# Patient Record
Sex: FEMALE | Race: BLACK OR AFRICAN AMERICAN | NOT HISPANIC OR LATINO | Employment: FULL TIME | URBAN - METROPOLITAN AREA
[De-identification: names, ages, dates, MRNs, and addresses within clinical notes are randomized per-mention and may not be internally consistent; named-entity substitution may affect disease eponyms.]

---

## 2017-01-04 ENCOUNTER — PRE VISIT (OUTPATIENT)
Dept: UROLOGY | Facility: CLINIC | Age: 37
End: 2017-01-04

## 2017-01-12 ENCOUNTER — OFFICE VISIT (OUTPATIENT)
Dept: UROLOGY | Facility: CLINIC | Age: 37
End: 2017-01-12
Attending: INTERNAL MEDICINE

## 2017-01-12 VITALS
HEART RATE: 85 BPM | HEIGHT: 63 IN | WEIGHT: 148 LBS | SYSTOLIC BLOOD PRESSURE: 110 MMHG | DIASTOLIC BLOOD PRESSURE: 61 MMHG | BODY MASS INDEX: 26.22 KG/M2

## 2017-01-12 DIAGNOSIS — C64.2 RENAL CELL CANCER, LEFT (H): Primary | ICD-10-CM

## 2017-01-12 ASSESSMENT — PAIN SCALES - GENERAL: PAINLEVEL: NO PAIN (0)

## 2017-01-12 NOTE — Clinical Note
1/12/2017       RE: Brynn Sprague  [address] [facility] [address]     Dear Colleague,    Thank you for referring your patient, Brynn Sprague, to the [facility] UROLOGY AND [facility] FOR PROSTATE AND UROLOGIC CANCERS at [facility]. Please see a copy of my visit note below.          Chief Complaint:   Renal Mass           Consult or Referral:     Mr. Brynn Sprague is a 36 year old female seen in consultation from Dr. Bagley.         History of Present Illness:   Brynn Sprague is a very pleasant 36 year old female who presents with a history of a Renal Mass.  This was discovered on CT after the pt presented to the emergency department with abdominal pain and shortness of breath in November 2016. The mass is large and enhancing measuring 5.7 x 5.6 cm in the upper pole of the left kidney. With regard to the collecting system, the edge of the tumor impinges upon the upper pole calyces. 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 The pt saw Dr Bagley of oncology who referred the pt to urology for surgical assessment.       Her last SCr is 0.58 12/16/2016.  She has no known risk factors for RCC including known exposures to dyes, pesticides, or chemicals. She has no known family history of kidney or other cancers.    Today, she is doing well, although anxious about her new diagnosis. She experiences intermittent left sided back pain associated with sitting against a hard surface or deep inspiration. She continues to experience mild dyspnea associated with eating large meals. Her symptoms have not prevented her from continuing with her daily work and activities. She has not had any fevers, chills, or weight loss.    ECOG Performance Score: 0  0=Fully active, 1=Unable to do strenuous activity but capable of light work, 2=Ambulatory and capable of all selfcare, 3=Capable of limited selfcare, confined to bed >50%  of waking hours, 4=Completely disabled.           Past Medical History:     Past Medical History   Diagnosis Date     Normal pregnancy 2006,08,09,12            Past Surgical History:     Past Surgical History   Procedure Laterality Date     Insert intrauterine device  10/16/15            Medications     Current Outpatient Prescriptions   Medication     ANUCORT-HC 25 MG suppository     levonorgestrel (MIRENA) 20 MCG/24HR IUD     No current facility-administered medications for this visit.            Family History:     Family History   Problem Relation Age of Onset     CANCER No family hx of             Social History:     Social History     Social History     Marital Status:      Spouse Name: Carmen Sahni     Number of Children: 4     Years of Education: N/A     Occupational History     facilities management [employer]     Social History Main Topics     Smoking status: Never Smoker      Smokeless tobacco: Never Used     Alcohol Use: No     Drug Use: No     Sexual Activity:     Partners: Male     Birth Control/ Protection: IUD     Other Topics Concern     Parent/Sibling W/ Cabg, Mi Or Angioplasty Before 65f 55m? No     Social History Narrative            Allergies:   Nkda         Review of Systems:  From intake questionnaire     Negative 14 system review except as noted on HPI, nurse's note.          Labs and Pathology:    I reviewed all applicable laboratory and pathology data reviewed with patient  Significant for     CR     0.58   12/16/2016  CR     0.57   3/19/2012        Imaging:    I reviewed all applicable imaging and went over the results with the patient.    11/22/2016 CT abdomen and pelvis:  There is a large enhancing mass in the upper pole left kidney  measuring 5.7 x 5.6 cm in maximum axial dimensions and 8.2 cm in  craniocaudal dimension. This impinges upon the upper pole calyces, but  there is no hydronephrosis. Kidneys are otherwise unremarkable  bilaterally. No renal calculi.  Upper abdominal organs including the  liver, spleen, contracted gallbladder, pancreas and adrenal glands are  within normal limits. No enlarged lymph nodes      Outside and Past Medical records:    I spent 10 minutes reviewing outside and past medical records.           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  Evaluation of the literature demonstrates that approximately 80% of enhancing renal masses turn out to be kidney cancer upon removal, while 20% are found to be benign.  Her kidney was biopsied and shown to be renal cell cancer    We discussed the options for treatment of a localized kidney mass including active surveillance, thermal ablation, and surgical extirpation.    Furthermore, we discussed the relative merits of partial nephrectomy vs. radical nephrectomy and the theoretic basis behind maximal nephron preservation.  We also discussed the advantages and disadvantages and roles of open surgery vs. laparoscopic (and Da Luda assisted) surgery.    The anticipated post-operative course was explained, including an anticipated day(s) hospital stay.    Patient has decided she would like to proceed with Robotic Partial Nephrectomy - 31799 with the possibility of needing to do a rad nephrectomy      The risks, benefits, alternatives, and personnel involved in the procudure were discussed.  All questions were answered.  A written informed consent will be finalized on the morning of the procedure.          Orders  Orders Placed This Encounter   Procedures     Consuelo-Operative Worksheet       F/U:   For surgery      As the medical student, I acted as a scribe for this note. All portions of the documented history and physical were personally performed by Dr. Cross as the attending physician.     Manasa Weinstein, MS3    Attestation:  This patient was seen  "and evaluated by me, with the medical student serving as scribe.  I have reviewed the note above and agree.  The physical exam was performed by me and the pertinant details are outlined below.          Physical Exam:     Patient is a 36 year old  female   Vitals: Blood pressure 110/61, pulse 85, height 1.594 m (5' 2.76\"), weight 67.132 kg (148 lb), not currently breastfeeding.  General Appearance Adult: Alert, anxious no acute distress, oriented  Lungs: no respiratory distress, or pursed lip breathing  Abdomen: Body mass index is 26.42 kg/(m^2)., scars noted None  Lymphatics: No cervical or supraclavicular adenopathy  Musculoskeltal: extremities normal, no peripheral edema  Skin: no visible suspicious lesions or rashes  Neuro: Alert, oriented, speech and mentation normal  Psych: affect and mood normal  Gait: Normal  : deferred    Assessment:  Renal cell cancer    Plan:  Robotic partial vs. Radical nephrectomy    Jairo Cross MD  Department of Urology  [facility]      CC  Patient Care Team:  Dallas Parra PA-C as PCP - General (Physician Assistant)  Ileana Esteves NP as Nurse Practitioner  Kirstie Matos APRN CNS as Nurse Practitioner (Nurse)  Clementina Philip RN as Nurse Coordinator  VINCENT NATARAJAN    Copy to patient  REBECA GÓMEZ  [address] [facility] [address]        "

## 2017-01-12 NOTE — PROGRESS NOTES
Chief Complaint:   Renal Mass           Consult or Referral:     Mr. Brynn Sprague is a 36 year old female seen in consultation from Dr. Bagley.         History of Present Illness:   Brynn Sprague is a very pleasant 36 year old female who presents with a history of a Renal Mass.  This was discovered on CT after the pt presented to the emergency department with abdominal pain and shortness of breath in November 2016. The mass is large and enhancing measuring 5.7 x 5.6 cm in the upper pole of the left kidney. With regard to the collecting system, the edge of the tumor impinges upon the upper pole calyces. 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 The pt saw Dr Bagley of oncology who referred the pt to urology for surgical assessment.       Her last SCr is 0.58 12/16/2016.  She has no known risk factors for RCC including known exposures to dyes, pesticides, or chemicals. She has no known family history of kidney or other cancers.    Today, she is doing well, although anxious about her new diagnosis. She experiences intermittent left sided back pain associated with sitting against a hard surface or deep inspiration. She continues to experience mild dyspnea associated with eating large meals. Her symptoms have not prevented her from continuing with her daily work and activities. She has not had any fevers, chills, or weight loss.    ECOG Performance Score: 0  0=Fully active, 1=Unable to do strenuous activity but capable of light work, 2=Ambulatory and capable of all selfcare, 3=Capable of limited selfcare, confined to bed >50% of waking hours, 4=Completely disabled.           Past Medical History:     Past Medical History   Diagnosis Date     Normal pregnancy 2006,08,09,12            Past Surgical History:     Past Surgical History   Procedure Laterality Date     Insert intrauterine device  10/16/15            Medications     Current Outpatient  Prescriptions   Medication     ANUCORT-HC 25 MG suppository     levonorgestrel (MIRENA) 20 MCG/24HR IUD     No current facility-administered medications for this visit.            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 No     Social History Narrative            Allergies:   Nkda         Review of Systems:  From intake questionnaire     Negative 14 system review except as noted on HPI, nurse's note.          Labs and Pathology:    I reviewed all applicable laboratory and pathology data reviewed with patient  Significant for     CR     0.58   12/16/2016  CR     0.57   3/19/2012        Imaging:    I reviewed all applicable imaging and went over the results with the patient.    11/22/2016 CT abdomen and pelvis:  There is a large enhancing mass in the upper pole left kidney  measuring 5.7 x 5.6 cm in maximum axial dimensions and 8.2 cm in  craniocaudal dimension. This impinges upon the upper pole calyces, but  there is no hydronephrosis. Kidneys are otherwise unremarkable  bilaterally. No renal calculi. Upper abdominal organs including the  liver, spleen, contracted gallbladder, pancreas and adrenal glands are  within normal limits. No enlarged lymph nodes      Outside and Past Medical records:    I spent 10 minutes reviewing outside and past medical records.           Assessment and Plan:     Assessment:  Stage II cT2 cN0  "cM0 left chromophobe renal cell carcinoma    Plan:  We had an extensive discussion about the significance of a localized, enhancing kidney mass.  Evaluation of the literature demonstrates that approximately 80% of enhancing renal masses turn out to be kidney cancer upon removal, while 20% are found to be benign.  Her kidney was biopsied and shown to be renal cell cancer    We discussed the options for treatment of a localized kidney mass including active surveillance, thermal ablation, and surgical extirpation.    Furthermore, we discussed the relative merits of partial nephrectomy vs. radical nephrectomy and the theoretic basis behind maximal nephron preservation.  We also discussed the advantages and disadvantages and roles of open surgery vs. laparoscopic (and Da Luda assisted) surgery.    The anticipated post-operative course was explained, including an anticipated day(s) hospital stay.    Patient has decided she would like to proceed with Robotic Partial Nephrectomy - 61762 with the possibility of needing to do a rad nephrectomy      The risks, benefits, alternatives, and personnel involved in the procudure were discussed.  All questions were answered.  A written informed consent will be finalized on the morning of the procedure.          Orders  Orders Placed This Encounter   Procedures     Consuelo-Operative Worksheet       F/U:   For surgery      As the medical student, I acted as a scribe for this note. All portions of the documented history and physical were personally performed by Dr. Cross as the attending physician.     Manasa Weinstein, MS3    Attestation:  This patient was seen and evaluated by me, with the medical student serving as scribe.  I have reviewed the note above and agree.  The physical exam was performed by me and the pertinant details are outlined below.          Physical Exam:     Patient is a 36 year old  female   Vitals: Blood pressure 110/61, pulse 85, height 1.594 m (5' 2.76\"), " weight 67.132 kg (148 lb), not currently breastfeeding.  General Appearance Adult: Alert, anxious no acute distress, oriented  Lungs: no respiratory distress, or pursed lip breathing  Abdomen: Body mass index is 26.42 kg/(m^2)., scars noted None  Lymphatics: No cervical or supraclavicular adenopathy  Musculoskeltal: extremities normal, no peripheral edema  Skin: no visible suspicious lesions or rashes  Neuro: Alert, oriented, speech and mentation normal  Psych: affect and mood normal  Gait: Normal  : deferred    Assessment:  Renal cell cancer    Plan:  Robotic partial vs. Radical nephrectomy    Jairo Cross MD  Department of Urology  [facility]        CC  Patient Care Team:  Dallas Parra PA-C as PCP - General (Physician Assistant)  Ileana Esteves NP as Nurse Practitioner  Kirstie Matos APRN CNS as Nurse Practitioner (Nurse)  Clementina Philip RN as Nurse Coordinator  VINCENT NATARAJAN    Copy to patient  REBECA ROMERORITA  [address] [facility] [address]

## 2017-01-12 NOTE — PATIENT INSTRUCTIONS
Schedule surgery with Dr. Cross.    It was a pleasure meeting with you today.  Thank you for allowing me and my team the privilege of caring for you today.  YOU are the reason we are here, and I truly hope we provided you with the excellent service you deserve.  Please let us know if there is anything else we can do for you so that we can be sure you are leaving completely satisfied with your care experience.      GABBY Levy

## 2017-01-12 NOTE — NURSING NOTE
"Chief Complaint   Patient presents with     Consult     Renal cell cancer consult       Blood pressure 110/61, pulse 85, height 1.594 m (5' 2.76\"), weight 67.132 kg (148 lb), not currently breastfeeding. Body mass index is 26.42 kg/(m^2).    Patient Active Problem List   Diagnosis     CARDIOVASCULAR SCREENING; LDL GOAL LESS THAN 160     Cervical polyp     Impingement syndrome of right shoulder       Allergies   Allergen Reactions     Nkda [No Known Drug Allergies]        Current Outpatient Prescriptions   Medication Sig Dispense Refill     ANUCORT-HC 25 MG suppository Place 1 suppository (25 mg) rectally as needed 24 suppository 0     levonorgestrel (MIRENA) 20 MCG/24HR IUD 1 each by Intrauterine route once         Social History   Substance Use Topics     Smoking status: Never Smoker      Smokeless tobacco: Never Used     Alcohol Use: No       GABBY Hurst  1/12/2017  8:29 AM       "

## 2017-01-12 NOTE — MR AVS SNAPSHOT
After Visit Summary   1/12/2017    Brynn Sprague    MRN: [id number]           Patient Information     Date Of Birth          1980        Visit Information        Provider Department      1/12/2017 8:30 AM Jairo Cross MD [facility] Urology and [facility] for Prostate and Urologic Cancers        Today's Diagnoses     Renal cell cancer, left (H)    -  1       Care Instructions    Schedule surgery with Dr. Cross.    It was a pleasure meeting with you today.  Thank you for allowing me and my team the privilege of caring for you today.  YOU are the reason we are here, and I truly hope we provided you with the excellent service you deserve.  Please let us know if there is anything else we can do for you so that we can be sure you are leaving completely satisfied with your care experience.      GABBY Levy        Follow-ups after your visit        Your next 10 appointments already scheduled     Jan 13, 2017  8:30 AM   (Arrive by 8:15 AM)   PAC RN ASSESSMENT with  Pac Rn   [facility] Preoperative Assessment [place] ([facility] [place])    [address] [place] [address]   [phone]            Jan 13, 2017  9:00 AM   (Arrive by 8:45 AM)   PAC EVALUATION with  Pac Bonnie [address] [place] [address] Preoperative Assessment [place] ([facility])    [address] [place] [address]   [phone]            Jan 13, 2017 10:10 AM   (Arrive by 9:55 AM)   PAC Anesthesia Consult with  Pac Anesthesiologist   [facility] Preoperative Assessment [place] ([facility])    [address] [place] [address]   [phone]            Jan 16, 2017   Procedure with Jairo Cross MD   [facility] [place], Same Day Surgery (--)    [address] [facility] [address]   [phone]            Feb 23, 2017  1:45 PM   (Arrive by 1:30 PM)   Post-Op with Jairo Cross MD  "  [facility] for Prostate and Urologic Cancers ([facility])    [address] [facility]  [address] [facility] [address] [phone]              Who to contact     Please call your clinic at [phone] to:    Ask questions about your health    Make or cancel appointments    Discuss your medicines    Learn about your test results    Speak to your doctor   If you have compliments or concerns about an experience at your clinic, or if you wish to file a complaint, please contact [facility] Patient Relations at [phone] or email us at [email or web address]         Additional Information About Your Visit        [employer] Information     [employer] is an electronic gateway that provides easy, online access to your medical records. With [employer], you can request a clinic appointment, read your test results, renew a prescription or communicate with your care team.     To sign up for [employer] visit the website at [email or web address]   You will be asked to enter the access code listed below, as well as some personal information. Please follow the directions to create your username and password.     Your access code is: [email or web address]  Expires: 2017  7:55 AM     Your access code will  in 90 days. If you need help or a new code, please contact your [facility] or call [phone] for assistance.        Care EveryWhere ID     This is your Care EveryWhere ID. This could be used by other organizations to access your [place] medical records  [id number]        Your Vitals Were     Pulse Height BMI (Body Mass Index)             85 1.594 m (5' 2.76\") 26.42 kg/m2          Blood Pressure from Last 3 Encounters:   17 110/61   16 105/60   16 99/66    Weight from Last 3 Encounters:   17 67.132 kg (148 lb)   16 66.679 kg (147 lb)   16 66.679 kg (147 lb)              We " Performed the Following     Consuelo-Operative Worksheet        Primary Care Provider Office Phone # Fax #    Dallas Parra PA-C [phone]       [facility] [address] [place] [address]        Thank you!     Thank you for choosing [facility] UROLOGY AND [facility] FOR PROSTATE AND UROLOGIC CANCERS  for your care. Our goal is always to provide you with excellent care. Hearing back from our patients is one way we can continue to improve our services. Please take a few minutes to complete the written survey that you may receive in the mail after your visit with us. Thank you!             Your Updated Medication List - Protect others around you: Learn how to safely use, store and throw away your medicines at www.disposemymeds.org.          This list is accurate as of: 1/12/17  9:57 AM.  Always use your most recent med list.                   Brand Name Dispense Instructions for use    ANUCORT-HC 25 MG Suppository   Generic drug:  hydrocortisone     24 suppository    Place 1 suppository (25 mg) rectally as needed       levonorgestrel 20 MCG/24HR IUD    MIRENA     1 each by Intrauterine route once

## 2017-01-13 ENCOUNTER — ALLIED HEALTH/NURSE VISIT (OUTPATIENT)
Dept: SURGERY | Facility: CLINIC | Age: 37
End: 2017-01-13

## 2017-01-13 ENCOUNTER — OFFICE VISIT (OUTPATIENT)
Dept: SURGERY | Facility: CLINIC | Age: 37
End: 2017-01-13

## 2017-01-13 ENCOUNTER — CARE COORDINATION (OUTPATIENT)
Dept: UROLOGY | Facility: CLINIC | Age: 37
End: 2017-01-13

## 2017-01-13 ENCOUNTER — ANESTHESIA EVENT (OUTPATIENT)
Dept: SURGERY | Facility: CLINIC | Age: 37
DRG: 658 | End: 2017-01-13
Payer: COMMERCIAL

## 2017-01-13 VITALS
HEART RATE: 75 BPM | DIASTOLIC BLOOD PRESSURE: 79 MMHG | WEIGHT: 150.1 LBS | TEMPERATURE: 97.8 F | OXYGEN SATURATION: 99 % | BODY MASS INDEX: 26.59 KG/M2 | RESPIRATION RATE: 16 BRPM | HEIGHT: 63 IN | SYSTOLIC BLOOD PRESSURE: 117 MMHG

## 2017-01-13 DIAGNOSIS — Z01.818 PREOP EXAMINATION: Primary | ICD-10-CM

## 2017-01-13 DIAGNOSIS — Z01.818 PREOP EXAMINATION: ICD-10-CM

## 2017-01-13 LAB
ALBUMIN UR-MCNC: NEGATIVE MG/DL
APPEARANCE UR: CLEAR
BILIRUB UR QL STRIP: NEGATIVE
COLOR UR AUTO: NORMAL
GLUCOSE UR STRIP-MCNC: NEGATIVE MG/DL
HGB UR QL STRIP: NEGATIVE
INR PPP: 0.99 (ref 0.86–1.14)
KETONES UR STRIP-MCNC: NEGATIVE MG/DL
LEUKOCYTE ESTERASE UR QL STRIP: NEGATIVE
NITRATE UR QL: NEGATIVE
PH UR STRIP: 7 PH (ref 5–7)
SP GR UR STRIP: 1 (ref 1–1.03)
URN SPEC COLLECT METH UR: NORMAL
UROBILINOGEN UR STRIP-MCNC: 0 MG/DL (ref 0–2)

## 2017-01-13 RX ORDER — ACETAMINOPHEN 325 MG/1
325 TABLET ORAL PRN
COMMUNITY
End: 2017-09-07

## 2017-01-13 RX ORDER — IBUPROFEN 200 MG
200-400 TABLET ORAL PRN
Status: ON HOLD | COMMUNITY
Start: 2012-03-15 | End: 2017-01-17

## 2017-01-13 NOTE — ANESTHESIA PREPROCEDURE EVALUATION
Brynn Sprague <[id number]>  Female - 36 year old - [birth year]  DAVINCI NEPHRECTOMY PARTIAL (Left Kidney)       Hematology Labs        WBC    RBC    Hematocrit    Hemoglobin    Plt    MCV        2.4  12/16/16 1143 4.47 12/16/16 1143 38.9 12/16/16 1143 12.4 01/16/17 1213 206 12/16/16 1143 87 12/16/16 1143     3.6  10/31/16 1417 4.26 10/31/16 1417 36.9 11/30/16 1455 13.3 12/16/16 1143 192 11/30/16 1455 90 10/31/16 1417     2.0  10/28/16 0907 4.44 10/28/16 0907 38.3 10/31/16 1417 12.3 11/30/16 1455 209 10/31/16 1417 89 10/28/16 0907         39.5 10/28/16 0907 12.8 10/31/16 1417 217 10/28/16 0907             13.2 10/28/16 0907           Urine Labs        BLOOD UA    NITRITE UA        Negative 01/13/17 1025 Negative 01/13/17 1025       Pregnancy Labs        No relevant labs found       Chemistry Labs        Na+    K+    Ca2+    Cl    BUN    CRT        140 12/16/16 1143 3.7 12/16/16 1143 8.5 12/16/16 1143 106 12/16/16 1143 9 12/16/16 1143 0.58 12/16/16 1143       Blood Gases        No relevant labs found       Coagulation Labs        INR        0.99 01/13/17 1015     1.01 11/30/16 1455       Electrolyte Labs        Na    Cl        140 12/16/16 1143 106 12/16/16 1143       Other Labs        ALT    AST        20 12/16/16 1143 14 12/16/16 1143     29 10/28/16 0907 19 10/28/16 0907                 Anesthesia Evaluation     . Pt has not had prior anesthetic       ROS/MED HX    ENT/Pulmonary:     (+), . Other pulmonary disease pulmonary nodules in the right upper lobe.    Neurologic:  - neg neurologic ROS     Cardiovascular:  - neg cardiovascular ROS   (+) ----. : . . . :. . Previous cardiac testing date:results:date: results:ECG reviewed date:11/10/2016 results:NSR date: results:          METS/Exercise Tolerance:  >4 METS   Hematologic:     (+) Other Hematologic Disorder-chronic neutropenia     (-) history of blood clots and History of Transfusion   Musculoskeletal:  - neg musculoskeletal ROS       GI/Hepatic:     (+) GERD  Other,       Renal/Genitourinary:     (+) Other Renal/ Genitourinary, left renal mass, renal cell carcinoma      Endo:  - neg endo ROS       Psychiatric:  - neg psychiatric ROS       Infectious Disease:  - neg infectious disease ROS       Malignancy:   (+) Malignancy History of Other  Other CA renal cell Active status post         Other:    (+) No chance of pregnancy              Physical Exam  Normal systems: dental    Airway   Mallampati: II  TM distance: >3 FB  Neck ROM: full    Dental     Cardiovascular   Rhythm and rate: regular and normal      Pulmonary    breath sounds clear to auscultation               PAC Discussion and Assessment    ASA Classification: 2  Case is suitable for: [place]  Anesthetic techniques and relevant risks discussed: GA  Invasive monitoring and risk discussed: Yes  Types:   Possibility and Risk of blood transfusion discussed: Yes  NPO instructions given:   Additional anesthetic preparation and risks discussed:   Needs early admission to pre-op area:   Other:     PAC Resident/NP Anesthesia Assessment:  Brynn Sprague is a 36 year old scheduled for left davinci nephrectomy partial on 1/16/2017 by Dr. Cross in treatment of left renal mass/renal cell carcinoma.  PAC referral for risk assessment and optimization for anesthesia with comorbid conditions of: chronic neutropenia and pulmonary nodules.     Pre-operative considerations:  1.  Cardiac:  Functional status- METS >4.  She doesn't partake in any purposeful exercise, but is very active at her full-time job and can walk long distances with no complications. High risk surgery with 0.4% risk of major adverse cardiac event. No further cardiac evaluation needed per 2014 ACC/AHA guidelines for non-cardiac surgery.  2.  Pulm:  Airway feasible.  KIARA risk: low.  Known right upper lobe nodules; oncology monitoring.   3.  GI:  Risk of PONV score = 2.  If > 2, anti-emetic intervention recommended.  Has periodic GERD symptoms, but none in the  "last 3 months that she can recall.  4. HEME:  Patient has known chronic neutropenia.  Has been evaluated by heme/onc and it is thought to possibly be benign essential neutropenia. Monitor closely post-op.      VTE risk: 1.8%    Patient is optimized and is acceptable candidate for the proposed procedure.  No further diagnostic evaluation is needed.     Patient discussed with Dr. Nelson. See recommendations below.     For further details of assessment, testing, and physical exam please see H and P completed on same date.          Sidra Davis DNP, RN, APRN        Reviewed and Signed by PAC Mid-Level Provider/Resident  Mid-Level Provider/Resident: Sidra Davis DNP, RN, APRN  Date: 1/13/2017  Time: 0938    Attending Anesthesiologist Anesthesia Assessment:  STAFF:  36 y.o. woman with renal mass disease for resection by Dr. Cross using general anesthesia.   History summarized above.  Type and screen ordered.  Instructions given and questions answered.   Final plans by anesthesiology team on DOS.   ---rcp      Reviewed and Signed by PAC Anesthesiologist  Anesthesiologist: rcp  Date: 1/13/2017  Time:   Pass/Fail: Pass  Disposition:     PAC Pharmacist Assessment:        Pharmacist:   Date:   Time:      Anesthesia Plan      History & Physical Review  History and physical reviewed and following examination; no interval change.    ASA Status:  2 .    NPO Status:  > 8 hours    Plan for General and ETT with Intravenous induction. Maintenance will be Inhalation.    PONV prophylaxis:  Ondansetron (or other 5HT-3) and Dexamethasone or Solumedrol  Procedure:   DAVINCI NEPHRECTOMY PARTIAL (Left Kidney) Davinci Assisted Partial Radical Nephrectomy         Anesthesia type: General     Pre-op diagnosis: Left Renal Mass     [facility]     Surgeon: Jairo Cross MD           Preoperative Vitals       BP: 124/86 Pulse: 83 Resp: 16  SpO2: 100 Temp: 36.8  C (98.2  F)   Ht: 1.6 m (5' 2.99\") "  (01/16/17) Wt: 66.2 kg (145 lb 15.1 oz)  (01/16/17)  BMI: 25.91 IBW: 52.382 kg (115 lb 7.7 oz)  Last edited 01/16/17 1136 by CD      HPI  Brynn Sprague is a 36 year old female who presents for pre-operative H & P in preparation for a left davinci nephrectomy partial with Dr. Cross on 1/16/17 at [facility].     Brynn Sprague is a 36 year old female with chronic mild neutropenia and pulmonary nodules that was recently diagnosed with renal cell carcinoma.  On 11/11/16 she went to the [facility] ER for a complaint of SOB and chest pain.  At that time a left renal mass was incidentally noted on a chest CT that was done.  Subsequently she scheduled consultation here and a biopsy was completed of the mass.  The biospy results showed renal cell carcinoma so she was referred to Dr. Cross for surgical consultation.  She has elected to proceed with the above listed procedure for treatment.      History is obtained from the patient and her spouse.      Past Medical History      Normal pregnancy  2006,08,09,12      Left renal mass        Chronic neutropenia (H)         possibly benign essential neutropenia      Renal cell carcinoma (H)        GERD (gastroesophageal reflux disease)        Pulmonary nodules       Past Surgical History      Insert intrauterine device    10/16/15     Hx of Blood transfusions/reactions: none    Hx of abnormal bleeding or anti-platelet use: none    Menstrual history: Patient's last menstrual period was 12/23/2016.    Steroid use in the last year: none    Personal or FH with difficulty with Anesthesia:  none    Current Outpatient Prescriptions      ibuprofen (ADVIL/MOTRIN) 200 MG tablet  Take 200-400 mg by mouth as needed          levonorgestrel (MIRENA) 20 MCG/24HR IUD  1 each by Intrauterine route once          acetaminophen (TYLENOL) 325 MG tablet  Take 325 mg by mouth as needed           Allergen  Reactions      Nkda (No Known Drug Allergies)      Plan:  GAET. IV x2. Check T&S. May HL second IV.      Postoperative Care  Postoperative pain management:  IV analgesics.      Consents  Anesthetic plan, risks, benefits and alternatives discussed with:  Patient.  Use of blood products discussed: Yes.   Consented to blood products.  .                          .

## 2017-01-13 NOTE — H&P
Pre-Operative H & P     CC:  Preoperative exam to assess for increased cardiopulmonary risk while undergoing surgery and anesthesia.    Date of Encounter: 1/13/2017  Primary Care Physician:  Dallas Parra  Brynn Sprague is a 36 year old female who presents for pre-operative H & P in preparation for a left davinci nephrectomy partial with Dr. Cross on 1/16/17 at [facility].     Brynn Sprague is a 36 year old female with chronic mild neutropenia and pulmonary nodules that was recently diagnosed with renal cell carcinoma.  On 11/11/16 she went [facility] for a complaint of SOB and chest pain.  At that time a left renal mass was incidentally noted on a chest CT that was done.  Subsequently she scheduled consultation here and a biopsy was completed of the mass.  The biospy results showed renal cell carcinoma so she was referred to Dr. Cross for surgical consultation.  She has elected to proceed with the above listed procedure for treatment.      History is obtained from the patient and her spouse.      Past Medical History  Past Medical History   Diagnosis Date     Normal pregnancy 2006,08,09,12     Left renal mass      Chronic neutropenia (H)      possibly benign essential neutropenia     Renal cell carcinoma (H)      GERD (gastroesophageal reflux disease)      Pulmonary nodules        Past Surgical History  Past Surgical History   Procedure Laterality Date     Insert intrauterine device  10/16/15       Hx of Blood transfusions/reactions: none    Hx of abnormal bleeding or anti-platelet use: none    Menstrual history: Patient's last menstrual period was 12/23/2016.    Steroid use in the last year: none    Personal or FH with difficulty with Anesthesia:  none    Prior to Admission Medications  Current Outpatient Prescriptions   Medication Sig Dispense Refill     ibuprofen (ADVIL/MOTRIN) 200 MG tablet Take 200-400 mg by mouth as needed        levonorgestrel (MIRENA) 20 MCG/24HR IUD 1 each by Intrauterine route once       acetaminophen (TYLENOL) 325 MG tablet Take 325 mg by mouth as needed         Allergies  Allergies   Allergen Reactions     Nkda [No Known Drug Allergies]        Social History  Social History     Social History     Marital Status:      Spouse Name: Carmen Sahni     Number of Children: 4     Years of Education: N/A     Occupational History     facilities management [employer]     Social History Main Topics     Smoking status: Never Smoker      Smokeless tobacco: Never Used     Alcohol Use: No     Drug Use: No     Sexual Activity:     Partners: Male     Birth Control/ Protection: IUD     Other Topics Concern     Parent/Sibling W/ Cabg, Mi Or Angioplasty Before 65f 55m? No     Social History Narrative       Family History  Family History   Problem Relation Age of Onset     CANCER No family hx of      Family History Negative Mother      Family History Negative Father            ROS/MED HX  The complete review of systems is negative other than noted in the HPI or here.     ENT/Pulmonary:     (+), . Other pulmonary disease pulmonary nodules in the right upper lobe.    Neurologic:  - neg neurologic ROS     Cardiovascular:  - neg cardiovascular ROS         METS/Exercise Tolerance:  >4 METS   Hematologic:     (+) Other Hematologic Disorder-chronic neutropenia     (-) history of blood clots and History of Transfusion   Musculoskeletal:  - neg musculoskeletal ROS       GI/Hepatic:     (+) GERD - rare, no symptoms in the last 3 months     Renal/Genitourinary:     (+) Other Renal/ Genitourinary, left renal mass, renal cell carcinoma      Endo:  - neg endo ROS       Psychiatric:  - neg psychiatric ROS       Infectious Disease:  - neg infectious disease ROS       Malignancy:   (+) Malignancy History of Other  Other CA renal cell Active status post         Other:    (+) No chance of pregnancy              Temp: 97.8  F (36.6  C)  "Temp src: Oral BP: 117/79 mmHg Pulse: 75   Resp: 16 SpO2: 99 %         150 lbs 1.6 oz  5' 3\"   Body mass index is 26.6 kg/(m^2).       Physical Exam  Constitutional: Awake, alert, cooperative, no apparent distress, and appears stated age.  Eyes: Pupils equal, round and reactive to light, extra ocular muscles intact, sclera clear, conjunctiva normal.  HENT: Normocephalic, oral pharynx with moist mucus membranes, good dentition. No goiter appreciated.   Respiratory: Clear to auscultation bilaterally, no crackles or wheezing.  Cardiovascular: Regular rate and rhythm, normal S1 and S2, and no murmur noted.  Carotids +2, no bruits. No edema. Palpable pulses to radial  DP and PT arteries.   GI: Normal bowel sounds, soft, non-distended, non-tender, no masses palpated, no hepatosplenomegaly.    Lymph/Hematologic: No cervical lymphadenopathy and no supraclavicular lymphadenopathy.  Genitourinary:  deferred  Skin: Warm and dry.  No rashes at anticipated surgical site.   Musculoskeletal: Full ROM of neck. There is no redness, warmth, or swelling of the exposed joints. Gross motor strength is normal.    Neurologic: Awake, alert, oriented to name, place and time. Cranial nerves II-XII are grossly intact. Gait is normal.   Neuropsychiatric: Calm, cooperative. Normal affect.     Labs: (personally reviewed)   Component      Latest Ref Rng 12/16/2016   WBC      4.0 - 11.0 10e9/L 2.4 (L)   RBC Count      3.8 - 5.2 10e12/L 4.47   Hemoglobin      11.7 - 15.7 g/dL 13.3   Hematocrit      35.0 - 47.0 % 38.9   MCV      78 - 100 fl 87   MCH      26.5 - 33.0 pg 29.8   MCHC      31.5 - 36.5 g/dL 34.2   RDW      10.0 - 15.0 % 12.8   Platelet Count      150 - 450 10e9/L 206   Diff Method       Automated Method   % Neutrophils       55.7   % Lymphocytes       33.2   % Monocytes       8.1   % Eosinophils       2.6   % Basophils       0.4   Absolute Neutrophil      1.6 - 8.3 10e9/L 1.3 (L)   Absolute Lymphocytes      0.8 - 5.3 10e9/L 0.8 "   Absolute Monocytes      0.0 - 1.3 10e9/L 0.2   Absolute Eosinophils      0.0 - 0.7 10e9/L 0.1   Absolute Basophils      0.0 - 0.2 10e9/L 0.0   Sodium      133 - 144 mmol/L 140   Potassium      3.4 - 5.3 mmol/L 3.7   Chloride      94 - 109 mmol/L 106   Carbon Dioxide      20 - 32 mmol/L 27   Anion Gap      3 - 14 mmol/L 7   Glucose      70 - 99 mg/dL 128 (H)   Urea Nitrogen      7 - 30 mg/dL 9   Creatinine      0.52 - 1.04 mg/dL 0.58   GFR Estimate      >60 mL/min/1.7m2 >90 . . .   GFR Estimate If Black      >60 mL/min/1.7m2 >90 . . .   Calcium      8.5 - 10.1 mg/dL 8.5   Bilirubin Total      0.2 - 1.3 mg/dL 0.7   Albumin      3.4 - 5.0 g/dL 3.5   Protein Total      6.8 - 8.8 g/dL 7.1   Alkaline Phosphatase      40 - 150 U/L 40   ALT      0 - 50 U/L 20   AST      0 - 45 U/L 14     Component      Latest Ref Rng 2017   Color Urine       Straw   Appearance Urine       Clear   Glucose Urine      NEG mg/dL Negative   Bilirubin Urine      NEG Negative   Ketones Urine      NEG mg/dL Negative   Specific Gravity Urine      1.003 - 1.035 1.004   Blood Urine      NEG Negative   pH Urine      5.0 - 7.0 pH 7.0   Protein Albumin Urine      NEG mg/dL Negative   Urobilinogen mg/dL      0.0 - 2.0 mg/dL 0.0   Nitrite Urine      NEG Negative   Leukocyte Esterase Urine      NEG Negative   Source       Midstream Urine   INR      0.86 - 1.14 0.99         EK/10/2016 NSR      Outside records reviewed from: [facility] via [facility]    ASSESSMENT and PLAN  Brynn Sprague is a 36 year old scheduled for left davinci nephrectomy partial on 2017 by Dr. Cross in treatment of left renal mass/renal cell carcinoma.  PAC referral for risk assessment and optimization for anesthesia with comorbid conditions of: chronic neutropenia and pulmonary nodules.     Pre-operative considerations:  1.  Cardiac:  Functional status- METS >4.  She doesn't partake in any purposeful exercise, but is very active at her full-time job and can walk long  distances with no complications. High risk surgery with 0.4% risk of major adverse cardiac event. No further cardiac evaluation needed per 2014 ACC/AHA guidelines for non-cardiac surgery.  2.  Pulm:  Airway feasible.  KIARA risk: low.  Known right upper lobe nodules; oncology monitoring.   3.  GI:  Risk of PONV score = 2.  If > 2, anti-emetic intervention recommended.  Has periodic GERD symptoms, but none in the last 3 months that she can recall.  4. HEME:  Patient has known chronic neutropenia.  Has been evaluated by heme/onc and it is thought to possibly be benign essential neutropenia. Monitor closely post-op.      VTE risk: 1.8%    Patient is optimized and is acceptable candidate for the proposed procedure.  No further diagnostic evaluation is needed.     Patient discussed with Dr. Nelson.             LÁZARO Hendricks CNP  Preoperative Assessment Center  [facility]  [facility]  Phone: [phone]  Fax: [phone]

## 2017-01-13 NOTE — MR AVS SNAPSHOT
After Visit Summary   2017    Brynn Sprague    MRN: [id number]           Patient Information     Date Of Birth          1980        Visit Information        Provider Department      2017 8:30 AM Rn, [facility]        Care Instructions    Preparing for Your Surgery      Name:  Brynn Sprague   MRN:  [id number]   :  1980   Today's Date:  2017     Arriving for surgery:  Surgery date:  2017  Surgery time:  130 pm  Arrival time:  1130 am      Please come to:       [facility] [address]  [address] [place]  [address]    -   parking is available in front of the hospital from 5:15 am to 8:00 pm    -  Stop at the Information Desk in the lobby    -   Inform the information person that you are here for surgery. An escort to 3c will be provided. If you would not like an escort, please proceed to 3C on the 3rd floor. [phone]     What can I eat or drink?  -  You may have solid food or milk products until 8 hours prior to your surgery; until 530 am on   -  You may have water, apple juice or 7up/Sprite until 2 hours prior to your surgery; until 1130 am on     Which medicines can I take?        -  Please take these medications the day of surgery:  Tylenol if needed      How do I prepare myself?  -  Take two showers: one the night before surgery; and one the morning of surgery.         Use Scrubcare or Hibiclens to wash from neck down.  You may use your own shampoo and conditioner. No other hair products.   -  Do NOT use lotion, powder, deodorant, or antiperspirant the day of your surgery.  -  Do NOT wear any makeup, fingernail polish or jewelry.  -  Begin using Incentive Spirometer 1 week prior to surgery.  Use 4 times per day, up to 5-10 breaths each time.  Bring Incentive Spirometer to hospital.  -Do not bring your own medications to the hospital, except for inhalers and eye drops.  -   Bring your ID and insurance card.    Questions or Concerns:  If you have questions or concerns, please call the  Preoperative Assessment Center, Monday-Friday 7AM-7PM:  [phone]  AFTER YOUR SURGERY  Breathing exercises   Breathing exercises help you recover faster. Take deep breaths and let the air out slowly. This will:     Help you wake up after surgery.    Help prevent complications like pneumonia.  Preventing complications will help you go home sooner.   We may give you a breathing device (incentive spirometer) to encourage you to breathe deeply.   Nausea and vomiting   You may feel sick to your stomach after surgery; if so, let your nurse know.    Pain control:  After surgery, you may have pain. Our goal is to help you manage your pain. Pain medicine will help you feel comfortable enough to do activities that will help you heal.  These activities may include breathing exercises, walking and physical therapy.   To help your health care team treat your pain we will ask: 1) If you have pain  2) where it is located 3) describe your pain in your words  Methods of pain control include medications given by mouth, vein or by nerve block for some surgeries.  We may give you a pain control pump that will:  1) Deliver the medicine through a tube placed in your vein  2) Control the amount of medicine you receive  3) Allow you to push a button to deliver a dose of pain medicine  Sequential Compression Device (SCD) or Pneumo Boots:  You may need to wear SCD S on your legs or feet. These are wraps connected to a machine that pumps in air and releases it. The repeated pumping helps prevent blood clots from forming.     Using an Incentive Spirometer  Soon after your surgery, a nurse or therapist will teach you breathing exercises. These keep your lungs clear, strengthen your breathing muscles, and help prevent complications.  The exercises include doing a deep-breathing exercise using a device called an incentive  spirometer.  To do these exercises, you will breathe in through your mouth and not your nose. The incentive spirometer only works correctly if you breathe in through your mouth.  Four steps to clear lungs     Deep breathing expands the lungs, aids circulation, and helps prevent pneumonia.   1. Exhale normally.    Relax and breathe out.  2. Place your lips tightly around the mouthpiece.    Make sure the device is upright and not tilted.  3. Inhale as much air as you can through the mouthpiece (don't breath through your nose).    Inhale slowly and deeply.    Hold your breath long enough to keep the balls or disk raised for at least 3 seconds.    If you re inhaling too quickly, your device may make a tone. If you hear this tone, inhale more slowly.  4. Repeat the exercise regularly.    Do this exercise every hour while you're awake, or as your health care provider instructs.    You will also be taught coughing exercises and be asked to do them regularly on your own.    8736-4098 [employer]. [address] [place] [address]. All rights reserved. This information is not intended as a substitute for professional medical care. Always follow your healthcare professional's instructions.                    Follow-ups after your visit        Your next 10 appointments already scheduled     Jan 13, 2017  9:00 AM   (Arrive by 8:45 AM)   PAC EVALUATION with  Pac Bonnie [address] [place] [address] Preoperative Assessment [facility] ([facility])    [address] [place] [id number]   [phone]            Jan 13, 2017 10:10 AM   (Arrive by 9:55 AM)   PAC Anesthesia Consult with  Pac Anesthesiologist   [facility] Preoperative Assessment [facility] ([facility])    [address] [place] [address]   [phone]            Jan 13, 2017 10:30 AM   LAB with [facility]   [facility] ([facility])    [address] [place] [address]  Se  1st Floor  [facility] [id number] [phone]           Patient must bring picture ID.  Patient should be prepared to give a urine specimen  Please do not eat 10-12 hours before your appointment if you are coming in fasting for labs on lipids, cholesterol, or glucose (sugar).  Pregnant women should follow their Care Team instructions. Water with medications is okay. Do not drink coffee or other fluids.   If you have concerns about taking  your medications, please ask at office or if scheduling via Cloudbuild, send a message by clicking on Secure Messaging, Message Your Care Team.            Jan 16, 2017   Procedure with Jairo Cross MD   [facility] [place], Same Day Surgery (--)    [address] [facility] [address] [phone]            Feb 23, 2017  1:45 PM   (Arrive by 1:30 PM)   Post-Op with Jairo Cross MD   [facility] ([facility])    [address] [facility]  4th Floor  [facility] [id number] [phone]              Who to contact     Please call your clinic at [phone] to:    Ask questions about your health    Make or cancel appointments    Discuss your medicines    Learn about your test results    Speak to your doctor   If you have compliments or concerns about an experience at your clinic, or if you wish to file a complaint, please contact [facility] Patient Relations at [phone] or email us at [email or web address]         Additional Information About Your Visit        Cloudbuild Information     Cloudbuild is an electronic gateway that provides easy, online access to your medical records. With Cloudbuild, you can request a clinic appointment, read your test results, renew a prescription or communicate with your care team.     To sign up for Cloudbuild visit the website at [email or web address]   You will be asked to enter the access code listed below, as well  as some personal information. Please follow the directions to create your username and password.     Your access code is: [email or web address]  Expires: 2017  7:55 AM     Your access code will  in 90 days. If you need help or a new code, please contact your [facility] or call [phone] for assistance.        Care EveryWhere ID     This is your Care EveryWhere ID. This could be used by other organizations to access your [facility] medical records  [id number]         Blood Pressure from Last 3 Encounters:   17 110/61   16 105/60   16 99/66    Weight from Last 3 Encounters:   17 67.132 kg (148 lb)   16 66.679 kg (147 lb)   16 66.679 kg (147 lb)              Today, you had the following     No orders found for display       Primary Care Provider Office Phone # Fax #    Dallas Parra PA-C [phone]       [facility] [place] [address]  [place] [address]        Thank you!     Thank you for choosing [facility] PREOPERATIVE ASSESSMENT [facility]  for your care. Our goal is always to provide you with excellent care. Hearing back from our patients is one way we can continue to improve our services. Please take a few minutes to complete the written survey that you may receive in the mail after your visit with us. Thank you!             Your Updated Medication List - Protect others around you: Learn how to safely use, store and throw away your medicines at www.disposemymeds.org.          This list is accurate as of: 17  8:58 AM.  Always use your most recent med list.                   Brand Name Dispense Instructions for use    acetaminophen 325 MG tablet    TYLENOL     Take 325 mg by mouth as needed       ibuprofen 200 MG tablet    ADVIL/MOTRIN     Take 200-400 mg by mouth as needed       levonorgestrel 20 MCG/24HR IUD    MIRENA     1 each by Intrauterine route once

## 2017-01-13 NOTE — PATIENT INSTRUCTIONS
Preparing for Your Surgery      Name:  Brynn Sprague   MRN:  [id number]   :  1980   Today's Date:  2017     Arriving for surgery:  Surgery date:  2017  Surgery time:  130 pm  Arrival time:  1130 am      Please come to:       [facility] [address] [place]  [address]    -   parking is available in front of the hospital from 5:15 am to 8:00 pm    -  Stop at the Information Desk in the lobby    -   Inform the information person that you are here for surgery. An escort to 3c will be provided. If you would not like an escort, please proceed to 3C on the 3rd floor. [phone]     What can I eat or drink?  -  You may have solid food or milk products until 8 hours prior to your surgery; until 530 am on   -  You may have water, apple juice or 7up/Sprite until 2 hours prior to your surgery; until 1130 am on     Which medicines can I take?        -  Please take these medications the day of surgery:  Tylenol if needed      How do I prepare myself?  -  Take two showers: one the night before surgery; and one the morning of surgery.         Use Scrubcare or Hibiclens to wash from neck down.  You may use your own shampoo and conditioner. No other hair products.   -  Do NOT use lotion, powder, deodorant, or antiperspirant the day of your surgery.  -  Do NOT wear any makeup, fingernail polish or jewelry.  -  Begin using Incentive Spirometer 1 week prior to surgery.  Use 4 times per day, up to 5-10 breaths each time.  Bring Incentive Spirometer to hospital.  -Do not bring your own medications to the hospital, except for inhalers and eye drops.  -  Bring your ID and insurance card.    Questions or Concerns:  If you have questions or concerns, please call the  Preoperative Assessment Center, Monday-Friday 7AM-7PM:  [phone]  AFTER YOUR SURGERY  Breathing exercises   Breathing exercises help you recover faster. Take deep breaths and let the air  out slowly. This will:     Help you wake up after surgery.    Help prevent complications like pneumonia.  Preventing complications will help you go home sooner.   We may give you a breathing device (incentive spirometer) to encourage you to breathe deeply.   Nausea and vomiting   You may feel sick to your stomach after surgery; if so, let your nurse know.    Pain control:  After surgery, you may have pain. Our goal is to help you manage your pain. Pain medicine will help you feel comfortable enough to do activities that will help you heal.  These activities may include breathing exercises, walking and physical therapy.   To help your health care team treat your pain we will ask: 1) If you have pain  2) where it is located 3) describe your pain in your words  Methods of pain control include medications given by mouth, vein or by nerve block for some surgeries.  We may give you a pain control pump that will:  1) Deliver the medicine through a tube placed in your vein  2) Control the amount of medicine you receive  3) Allow you to push a button to deliver a dose of pain medicine  Sequential Compression Device (SCD) or Pneumo Boots:  You may need to wear SCD S on your legs or feet. These are wraps connected to a machine that pumps in air and releases it. The repeated pumping helps prevent blood clots from forming.     Using an Incentive Spirometer  Soon after your surgery, a nurse or therapist will teach you breathing exercises. These keep your lungs clear, strengthen your breathing muscles, and help prevent complications.  The exercises include doing a deep-breathing exercise using a device called an incentive spirometer.  To do these exercises, you will breathe in through your mouth and not your nose. The incentive spirometer only works correctly if you breathe in through your mouth.  Four steps to clear lungs     Deep breathing expands the lungs, aids circulation, and helps prevent pneumonia.   1. Exhale  normally.    Relax and breathe out.  2. Place your lips tightly around the mouthpiece.    Make sure the device is upright and not tilted.  3. Inhale as much air as you can through the mouthpiece (don't breath through your nose).    Inhale slowly and deeply.    Hold your breath long enough to keep the balls or disk raised for at least 3 seconds.    If you re inhaling too quickly, your device may make a tone. If you hear this tone, inhale more slowly.  4. Repeat the exercise regularly.    Do this exercise every hour while you're awake, or as your health care provider instructs.    You will also be taught coughing exercises and be asked to do them regularly on your own.    6753-5516 [employer]. [address] [place] [address], [place] [address]. All rights reserved. This information is not intended as a substitute for professional medical care. Always follow your healthcare professional's instructions.

## 2017-01-13 NOTE — PROGRESS NOTES
Pre Op Teaching Flowsheet       Pre and Post op Patient Education  Relevant Diagnosis:  Left renal mass  Surgical procedure:  Robotic Partial or radical Nephrectomy Left Renal Mass  Teaching Topic:  Pre and post op teaching  Person Involved in teaching: Brynn Sprague    Motivation Level:  Asks Questions: Yes  Eager to Learn:  Yes  Cooperative: Yes  Receptive (willing/able to accept information):  Yes    Patient demonstrates understanding of the following:  Date of surgery:  1/16/17  Location of surgery:  [address] [place] [address]  History and Physical and any other testing necessary prior to surgery: Yes  Required time line for completion of History and Physical and any pre-op testing: Yes    Patient demonstrates understanding of the following:  Pre-op bowel prep:  None needed  Pre-op showering/scrub information with PCMX Soap: Yes  Blood thinner medications discussed and when to stop (if applicable):  Yes      Infection Prevention:   Patient demonstrates understanding of the following:  Surgical procedure site care taught: at time of discharge  Signs and symptoms of infection taught:  Yes      Post-op follow-up:  Discussed how to contact the hospital, nurse, and clinic scheduling staff if necessary.    Instructional materials used/given/mailed:  [facility] Booklet, post op teaching sheet, Map, Soap, and arrival/location information.    Surgical instructions packet given to patient in office:  Yes.

## 2017-01-16 ENCOUNTER — HOSPITAL ENCOUNTER (INPATIENT)
Facility: CLINIC | Age: 37
LOS: 3 days | Discharge: HOME | DRG: 658 | End: 2017-01-19
Attending: UROLOGY | Admitting: UROLOGY
Payer: COMMERCIAL

## 2017-01-16 ENCOUNTER — ANESTHESIA (OUTPATIENT)
Dept: SURGERY | Facility: CLINIC | Age: 37
DRG: 658 | End: 2017-01-16
Payer: COMMERCIAL

## 2017-01-16 DIAGNOSIS — R10.13 DYSPEPSIA: ICD-10-CM

## 2017-01-16 DIAGNOSIS — C64.2 RENAL CANCER, LEFT (H): Primary | ICD-10-CM

## 2017-01-16 PROBLEM — C64.9 RENAL CANCER (H): Status: ACTIVE | Noted: 2017-01-16

## 2017-01-16 LAB
ABO + RH BLD: NORMAL
ABO + RH BLD: NORMAL
ANION GAP SERPL CALCULATED.3IONS-SCNC: 7 MMOL/L (ref 3–14)
BLD GP AB SCN SERPL QL: NORMAL
BLOOD BANK CMNT PATIENT-IMP: NORMAL
BLOOD BANK CMNT PATIENT-IMP: NORMAL
BUN SERPL-MCNC: 4 MG/DL (ref 7–30)
CALCIUM SERPL-MCNC: 7.9 MG/DL (ref 8.5–10.1)
CHLORIDE SERPL-SCNC: 107 MMOL/L (ref 94–109)
CO2 SERPL-SCNC: 26 MMOL/L (ref 20–32)
CREAT SERPL-MCNC: 0.54 MG/DL (ref 0.52–1.04)
ERYTHROCYTE [DISTWIDTH] IN BLOOD BY AUTOMATED COUNT: 13.2 % (ref 10–15)
GFR SERPL CREATININE-BSD FRML MDRD: ABNORMAL ML/MIN/1.7M2
GLUCOSE SERPL-MCNC: 113 MG/DL (ref 70–99)
HCG UR QL: NEGATIVE
HCT VFR BLD AUTO: 34.7 % (ref 35–47)
HGB BLD-MCNC: 11.3 G/DL (ref 11.7–15.7)
HGB BLD-MCNC: 12.4 G/DL (ref 11.7–15.7)
MCH RBC QN AUTO: 28.8 PG (ref 26.5–33)
MCHC RBC AUTO-ENTMCNC: 32.6 G/DL (ref 31.5–36.5)
MCV RBC AUTO: 88 FL (ref 78–100)
PLATELET # BLD AUTO: 152 10E9/L (ref 150–450)
POTASSIUM SERPL-SCNC: 3.7 MMOL/L (ref 3.4–5.3)
RBC # BLD AUTO: 3.93 10E12/L (ref 3.8–5.2)
SODIUM SERPL-SCNC: 140 MMOL/L (ref 133–144)
SPECIMEN EXP DATE BLD: NORMAL
WBC # BLD AUTO: 11.5 10E9/L (ref 4–11)

## 2017-01-16 PROCEDURE — 25800025 ZZH RX 258: Performed by: ANESTHESIOLOGY

## 2017-01-16 PROCEDURE — 27210995 ZZH RX 272

## 2017-01-16 PROCEDURE — 25000125 ZZHC RX 250: Performed by: UROLOGY

## 2017-01-16 PROCEDURE — 25000125 ZZHC RX 250: Performed by: NURSE ANESTHETIST, CERTIFIED REGISTERED

## 2017-01-16 PROCEDURE — 88307 TISSUE EXAM BY PATHOLOGIST: CPT | Performed by: UROLOGY

## 2017-01-16 PROCEDURE — 25000132 ZZH RX MED GY IP 250 OP 250 PS 637: Performed by: UROLOGY

## 2017-01-16 PROCEDURE — 37000009 ZZH ANESTHESIA TECHNICAL FEE, EACH ADDTL 15 MIN: Performed by: UROLOGY

## 2017-01-16 PROCEDURE — 0TB14ZZ EXCISION OF LEFT KIDNEY, PERCUTANEOUS ENDOSCOPIC APPROACH: ICD-10-PCS | Performed by: UROLOGY

## 2017-01-16 PROCEDURE — 81025 URINE PREGNANCY TEST: CPT | Performed by: ANESTHESIOLOGY

## 2017-01-16 PROCEDURE — 12000003 ZZH R&B CRITICAL UMMC

## 2017-01-16 PROCEDURE — 8E0W4CZ ROBOTIC ASSISTED PROCEDURE OF TRUNK REGION, PERCUTANEOUS ENDOSCOPIC APPROACH: ICD-10-PCS | Performed by: UROLOGY

## 2017-01-16 PROCEDURE — 25000125 ZZHC RX 250: Performed by: ANESTHESIOLOGY

## 2017-01-16 PROCEDURE — 71000014 ZZH RECOVERY PHASE 1 LEVEL 2 FIRST HR: Performed by: UROLOGY

## 2017-01-16 PROCEDURE — 36000088 ZZH SURGERY LEVEL 8 EA 15 ADDTL MIN - UMMC: Performed by: UROLOGY

## 2017-01-16 PROCEDURE — 25000128 H RX IP 250 OP 636: Performed by: UROLOGY

## 2017-01-16 PROCEDURE — 27210995 ZZH RX 272: Performed by: UROLOGY

## 2017-01-16 PROCEDURE — 37000008 ZZH ANESTHESIA TECHNICAL FEE, 1ST 30 MIN: Performed by: UROLOGY

## 2017-01-16 PROCEDURE — 71000015 ZZH RECOVERY PHASE 1 LEVEL 2 EA ADDTL HR: Performed by: UROLOGY

## 2017-01-16 PROCEDURE — 27210794 ZZH OR GENERAL SUPPLY STERILE: Performed by: UROLOGY

## 2017-01-16 PROCEDURE — 40000170 ZZH STATISTIC PRE-PROCEDURE ASSESSMENT II: Performed by: UROLOGY

## 2017-01-16 PROCEDURE — 25000566 ZZH SEVOFLURANE, EA 15 MIN: Performed by: UROLOGY

## 2017-01-16 PROCEDURE — 85027 COMPLETE CBC AUTOMATED: CPT | Performed by: UROLOGY

## 2017-01-16 PROCEDURE — 40000141 ZZH STATISTIC PERIPHERAL IV START W/O US GUIDANCE

## 2017-01-16 PROCEDURE — 85018 HEMOGLOBIN: CPT | Performed by: ANESTHESIOLOGY

## 2017-01-16 PROCEDURE — 25000128 H RX IP 250 OP 636: Performed by: NURSE ANESTHETIST, CERTIFIED REGISTERED

## 2017-01-16 PROCEDURE — 80048 BASIC METABOLIC PNL TOTAL CA: CPT | Performed by: UROLOGY

## 2017-01-16 PROCEDURE — 36000086 ZZH SURGERY LEVEL 8 1ST 30 MIN UMMC: Performed by: UROLOGY

## 2017-01-16 PROCEDURE — 36415 COLL VENOUS BLD VENIPUNCTURE: CPT | Performed by: ANESTHESIOLOGY

## 2017-01-16 RX ORDER — LIDOCAINE 40 MG/G
CREAM TOPICAL
Status: DISCONTINUED | OUTPATIENT
Start: 2017-01-16 | End: 2017-01-16 | Stop reason: HOSPADM

## 2017-01-16 RX ORDER — SODIUM CHLORIDE 9 MG/ML
INJECTION, SOLUTION INTRAVENOUS CONTINUOUS
Status: DISCONTINUED | OUTPATIENT
Start: 2017-01-16 | End: 2017-01-18 | Stop reason: CLARIF

## 2017-01-16 RX ORDER — KETOROLAC TROMETHAMINE 30 MG/ML
30 INJECTION, SOLUTION INTRAMUSCULAR; INTRAVENOUS EVERY 6 HOURS
Status: DISPENSED | OUTPATIENT
Start: 2017-01-16 | End: 2017-01-17

## 2017-01-16 RX ORDER — PROCHLORPERAZINE MALEATE 5 MG
5-10 TABLET ORAL EVERY 6 HOURS PRN
Status: DISCONTINUED | OUTPATIENT
Start: 2017-01-16 | End: 2017-01-19 | Stop reason: HOSPADM

## 2017-01-16 RX ORDER — ACETAMINOPHEN 325 MG/1
650 TABLET ORAL EVERY 4 HOURS PRN
Status: DISCONTINUED | OUTPATIENT
Start: 2017-01-19 | End: 2017-01-19 | Stop reason: HOSPADM

## 2017-01-16 RX ORDER — NEOSTIGMINE METHYLSULFATE 1 MG/ML
VIAL (ML) INJECTION PRN
Status: DISCONTINUED | OUTPATIENT
Start: 2017-01-16 | End: 2017-01-16

## 2017-01-16 RX ORDER — ONDANSETRON 2 MG/ML
INJECTION INTRAMUSCULAR; INTRAVENOUS PRN
Status: DISCONTINUED | OUTPATIENT
Start: 2017-01-16 | End: 2017-01-16

## 2017-01-16 RX ORDER — FENTANYL CITRATE 50 UG/ML
25-50 INJECTION, SOLUTION INTRAMUSCULAR; INTRAVENOUS
Status: DISCONTINUED | OUTPATIENT
Start: 2017-01-16 | End: 2017-01-16 | Stop reason: HOSPADM

## 2017-01-16 RX ORDER — ONDANSETRON 2 MG/ML
4 INJECTION INTRAMUSCULAR; INTRAVENOUS EVERY 30 MIN PRN
Status: DISCONTINUED | OUTPATIENT
Start: 2017-01-16 | End: 2017-01-16 | Stop reason: HOSPADM

## 2017-01-16 RX ORDER — ONDANSETRON 2 MG/ML
4 INJECTION INTRAMUSCULAR; INTRAVENOUS EVERY 6 HOURS PRN
Status: DISCONTINUED | OUTPATIENT
Start: 2017-01-16 | End: 2017-01-19 | Stop reason: HOSPADM

## 2017-01-16 RX ORDER — OXYCODONE HYDROCHLORIDE 5 MG/1
5-10 TABLET ORAL
Status: DISCONTINUED | OUTPATIENT
Start: 2017-01-16 | End: 2017-01-19 | Stop reason: HOSPADM

## 2017-01-16 RX ORDER — GLYCOPYRROLATE 0.2 MG/ML
INJECTION, SOLUTION INTRAMUSCULAR; INTRAVENOUS PRN
Status: DISCONTINUED | OUTPATIENT
Start: 2017-01-16 | End: 2017-01-16

## 2017-01-16 RX ORDER — ONDANSETRON 4 MG/1
4 TABLET, ORALLY DISINTEGRATING ORAL EVERY 30 MIN PRN
Status: DISCONTINUED | OUTPATIENT
Start: 2017-01-16 | End: 2017-01-16 | Stop reason: HOSPADM

## 2017-01-16 RX ORDER — DEXAMETHASONE SODIUM PHOSPHATE 4 MG/ML
INJECTION, SOLUTION INTRA-ARTICULAR; INTRALESIONAL; INTRAMUSCULAR; INTRAVENOUS; SOFT TISSUE PRN
Status: DISCONTINUED | OUTPATIENT
Start: 2017-01-16 | End: 2017-01-16

## 2017-01-16 RX ORDER — SODIUM CHLORIDE, SODIUM LACTATE, POTASSIUM CHLORIDE, CALCIUM CHLORIDE 600; 310; 30; 20 MG/100ML; MG/100ML; MG/100ML; MG/100ML
INJECTION, SOLUTION INTRAVENOUS CONTINUOUS
Status: DISCONTINUED | OUTPATIENT
Start: 2017-01-16 | End: 2017-01-16 | Stop reason: HOSPADM

## 2017-01-16 RX ORDER — PROPOFOL 10 MG/ML
INJECTION, EMULSION INTRAVENOUS PRN
Status: DISCONTINUED | OUTPATIENT
Start: 2017-01-16 | End: 2017-01-16

## 2017-01-16 RX ORDER — ACETAMINOPHEN 325 MG/1
975 TABLET ORAL EVERY 8 HOURS
Status: DISCONTINUED | OUTPATIENT
Start: 2017-01-16 | End: 2017-01-19 | Stop reason: HOSPADM

## 2017-01-16 RX ORDER — FENTANYL CITRATE 50 UG/ML
INJECTION, SOLUTION INTRAMUSCULAR; INTRAVENOUS PRN
Status: DISCONTINUED | OUTPATIENT
Start: 2017-01-16 | End: 2017-01-16

## 2017-01-16 RX ORDER — LIDOCAINE 40 MG/G
CREAM TOPICAL
Status: DISCONTINUED | OUTPATIENT
Start: 2017-01-16 | End: 2017-01-19 | Stop reason: HOSPADM

## 2017-01-16 RX ORDER — NALOXONE HYDROCHLORIDE 0.4 MG/ML
.1-.4 INJECTION, SOLUTION INTRAMUSCULAR; INTRAVENOUS; SUBCUTANEOUS
Status: DISCONTINUED | OUTPATIENT
Start: 2017-01-16 | End: 2017-01-19 | Stop reason: HOSPADM

## 2017-01-16 RX ORDER — HYDROMORPHONE HYDROCHLORIDE 1 MG/ML
.3-.5 INJECTION, SOLUTION INTRAMUSCULAR; INTRAVENOUS; SUBCUTANEOUS EVERY 5 MIN PRN
Status: DISCONTINUED | OUTPATIENT
Start: 2017-01-16 | End: 2017-01-16 | Stop reason: HOSPADM

## 2017-01-16 RX ORDER — LIDOCAINE HYDROCHLORIDE 20 MG/ML
INJECTION, SOLUTION INFILTRATION; PERINEURAL PRN
Status: DISCONTINUED | OUTPATIENT
Start: 2017-01-16 | End: 2017-01-16

## 2017-01-16 RX ORDER — CEFAZOLIN SODIUM 1 G/3ML
1 INJECTION, POWDER, FOR SOLUTION INTRAMUSCULAR; INTRAVENOUS SEE ADMIN INSTRUCTIONS
Status: DISCONTINUED | OUTPATIENT
Start: 2017-01-16 | End: 2017-01-16 | Stop reason: HOSPADM

## 2017-01-16 RX ORDER — ONDANSETRON 4 MG/1
4 TABLET, ORALLY DISINTEGRATING ORAL EVERY 6 HOURS PRN
Status: DISCONTINUED | OUTPATIENT
Start: 2017-01-16 | End: 2017-01-19 | Stop reason: HOSPADM

## 2017-01-16 RX ORDER — HYDROMORPHONE HCL/0.9% NACL/PF 0.2MG/0.2
.2-.4 SYRINGE (ML) INTRAVENOUS
Status: DISCONTINUED | OUTPATIENT
Start: 2017-01-16 | End: 2017-01-19 | Stop reason: HOSPADM

## 2017-01-16 RX ORDER — CEFAZOLIN SODIUM 2 G/100ML
2 INJECTION, SOLUTION INTRAVENOUS
Status: COMPLETED | OUTPATIENT
Start: 2017-01-16 | End: 2017-01-16

## 2017-01-16 RX ADMIN — PROPOFOL 150 MG: 10 INJECTION, EMULSION INTRAVENOUS at 13:43

## 2017-01-16 RX ADMIN — PROCHLORPERAZINE EDISYLATE 10 MG: 5 INJECTION INTRAMUSCULAR; INTRAVENOUS at 18:17

## 2017-01-16 RX ADMIN — OXYCODONE HYDROCHLORIDE 10 MG: 5 TABLET ORAL at 22:25

## 2017-01-16 RX ADMIN — LIDOCAINE HYDROCHLORIDE 60 MG: 20 INJECTION, SOLUTION INFILTRATION; PERINEURAL at 13:43

## 2017-01-16 RX ADMIN — HYDROMORPHONE HYDROCHLORIDE 0.25 MG: 1 INJECTION, SOLUTION INTRAMUSCULAR; INTRAVENOUS; SUBCUTANEOUS at 17:05

## 2017-01-16 RX ADMIN — Medication 3 MG: at 16:48

## 2017-01-16 RX ADMIN — DEXAMETHASONE SODIUM PHOSPHATE 8 MG: 4 INJECTION, SOLUTION INTRAMUSCULAR; INTRAVENOUS at 17:21

## 2017-01-16 RX ADMIN — SODIUM CHLORIDE: 9 INJECTION, SOLUTION INTRAVENOUS at 17:54

## 2017-01-16 RX ADMIN — HYDROMORPHONE HYDROCHLORIDE 0.3 MG: 1 INJECTION, SOLUTION INTRAMUSCULAR; INTRAVENOUS; SUBCUTANEOUS at 18:39

## 2017-01-16 RX ADMIN — HYDROMORPHONE HYDROCHLORIDE 0.2 MG: 10 INJECTION, SOLUTION INTRAMUSCULAR; INTRAVENOUS; SUBCUTANEOUS at 21:52

## 2017-01-16 RX ADMIN — PHENYLEPHRINE HYDROCHLORIDE 100 MCG: 10 INJECTION, SOLUTION INTRAMUSCULAR; INTRAVENOUS; SUBCUTANEOUS at 16:32

## 2017-01-16 RX ADMIN — ROCURONIUM BROMIDE 40 MG: 10 INJECTION INTRAVENOUS at 13:43

## 2017-01-16 RX ADMIN — PHENYLEPHRINE HYDROCHLORIDE 200 MCG: 10 INJECTION, SOLUTION INTRAMUSCULAR; INTRAVENOUS; SUBCUTANEOUS at 16:40

## 2017-01-16 RX ADMIN — PHENYLEPHRINE HYDROCHLORIDE 100 MCG: 10 INJECTION, SOLUTION INTRAMUSCULAR; INTRAVENOUS; SUBCUTANEOUS at 16:35

## 2017-01-16 RX ADMIN — FENTANYL CITRATE 25 MCG: 50 INJECTION, SOLUTION INTRAMUSCULAR; INTRAVENOUS at 18:07

## 2017-01-16 RX ADMIN — ONDANSETRON 4 MG: 2 INJECTION INTRAMUSCULAR; INTRAVENOUS at 16:44

## 2017-01-16 RX ADMIN — FENTANYL CITRATE 100 MCG: 50 INJECTION, SOLUTION INTRAMUSCULAR; INTRAVENOUS at 13:42

## 2017-01-16 RX ADMIN — FENTANYL CITRATE 25 MCG: 50 INJECTION, SOLUTION INTRAMUSCULAR; INTRAVENOUS at 17:51

## 2017-01-16 RX ADMIN — FENTANYL CITRATE 50 MCG: 50 INJECTION, SOLUTION INTRAMUSCULAR; INTRAVENOUS at 17:57

## 2017-01-16 RX ADMIN — ROCURONIUM BROMIDE 10 MG: 10 INJECTION INTRAVENOUS at 14:16

## 2017-01-16 RX ADMIN — CEFAZOLIN SODIUM 2 G: 2 INJECTION, SOLUTION INTRAVENOUS at 13:49

## 2017-01-16 RX ADMIN — MIDAZOLAM HYDROCHLORIDE 2 MG: 1 INJECTION, SOLUTION INTRAMUSCULAR; INTRAVENOUS at 13:29

## 2017-01-16 RX ADMIN — HYDROMORPHONE HYDROCHLORIDE 0.3 MG: 1 INJECTION, SOLUTION INTRAMUSCULAR; INTRAVENOUS; SUBCUTANEOUS at 18:15

## 2017-01-16 RX ADMIN — SODIUM CHLORIDE, POTASSIUM CHLORIDE, SODIUM LACTATE AND CALCIUM CHLORIDE: 600; 310; 30; 20 INJECTION, SOLUTION INTRAVENOUS at 15:51

## 2017-01-16 RX ADMIN — FENTANYL CITRATE 50 MCG: 50 INJECTION, SOLUTION INTRAMUSCULAR; INTRAVENOUS at 17:04

## 2017-01-16 RX ADMIN — HYDROMORPHONE HYDROCHLORIDE 0.4 MG: 1 INJECTION, SOLUTION INTRAMUSCULAR; INTRAVENOUS; SUBCUTANEOUS at 18:24

## 2017-01-16 RX ADMIN — CEFAZOLIN 1 G: 1 INJECTION, POWDER, FOR SOLUTION INTRAMUSCULAR; INTRAVENOUS at 15:49

## 2017-01-16 RX ADMIN — GLYCOPYRROLATE 0.6 MG: 0.2 INJECTION, SOLUTION INTRAMUSCULAR; INTRAVENOUS at 16:48

## 2017-01-16 RX ADMIN — KETOROLAC TROMETHAMINE 30 MG: 30 INJECTION, SOLUTION INTRAMUSCULAR at 19:46

## 2017-01-16 RX ADMIN — ONDANSETRON 4 MG: 2 INJECTION INTRAMUSCULAR; INTRAVENOUS at 17:50

## 2017-01-16 RX ADMIN — FENTANYL CITRATE 50 MCG: 50 INJECTION, SOLUTION INTRAMUSCULAR; INTRAVENOUS at 16:23

## 2017-01-16 RX ADMIN — SODIUM CHLORIDE, POTASSIUM CHLORIDE, SODIUM LACTATE AND CALCIUM CHLORIDE: 600; 310; 30; 20 INJECTION, SOLUTION INTRAVENOUS at 13:25

## 2017-01-16 RX ADMIN — FENTANYL CITRATE 50 MCG: 50 INJECTION, SOLUTION INTRAMUSCULAR; INTRAVENOUS at 17:02

## 2017-01-16 RX ADMIN — ROCURONIUM BROMIDE 20 MG: 10 INJECTION INTRAVENOUS at 15:23

## 2017-01-16 NOTE — OP NOTE
PREOPERATIVE DIAGNOSIS: Left renal mass  POSTOPERATIVE DIAGNOSIS: Same  PROCEDURE: Robot assisted laparoscopic left partial nephrectomy  ANESTHESIA: General endotracheal  STAFF SURGEON: Clark Cross MD present and scrubbed for entire procedure  RESIDENT SURGEON: Matthew Toussaint MD, Elidia Ritter MD  ESTIMATED BLOOD LOSS: 70 mL.   COMPLICATIONS: None immediately.   SPECIMENS:   1. left renal mass   DRAINS:   1. 16f rutledge  FINDINGS: ~8cm mass on the left upper pole, well encapsulated.  INDICATIONS: Brynn Sprague  is a 36 year old female who was incidentally discovered to have an 8 cm enhancing solid renal mass, concerning for malignancy.  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  After discussion of risks, benefits and alternatives, the patient agreed to proceed with the above named procedures.  DESCRIPTION OF THE PROCEDURE: After obtaining informed consent, the patient was brought to the operating room and placed in the supine position on the operating room table. All pressure points were adequately cushioned and pneumatic compression devices were applied to the patient's bilateral lower extremities. Appropriate preoperative antibiotics were administered. General endotracheal anesthesia was induced without difficulty. Rutledge was placed in the bladder in a sterile manner. The patients was then placed in the Right lateral decubitus position with the Left side up.   All pressure points were adequately cushioned.  The abdomen was shaved, prepped and draped in the usual sterile fashion  after the patient had been appropriately positioned on the table and secured to the table.   We began by inserting the Veress needle into the abdomen at the left lower quadrant. After confirmatory drop test, the abdomen was insufflated with low opening pressures. We then proceeded to place three 8 mm non-dilating robotic ports to triangulate the  Left kidney. The 12mm assistant port was placed in  the left lower quadrant. We docked the robot.  We began by first mobilizing the colon from that the lateral aspect of the abdominal wall and reflecting it medially. Gerota's fascia was then opened and the lower pole of the kidney mobilized. The gonadal vessels were identified and preserved. The ureter was also identified and preserved. We then traced these back to the hilum. There were two lumbar veins off the gonadal which were divided between weck clips.  We were able to identify a single renal vein and artery.  We made room around both structures to accommodate the bulldog laparoscopic clamps. We then used the ultrasound probe to identify the upper pole mass. We circumferentially divided the fat around the tumor until we isolated the parenchyma.  We developed the plane between the mass and the spleen.  We also identified and preserved the adrenal.  Then, using the US as our guide, we bovied the capsule to demarcate our planned dissection.  After confirming on US the tumor was within the line we demarcated, we then placed a laparoscopic bulldog clamp on the renal artery.  The Tumor was sharply excised taking care to avoid perforating the capsule.   We did identify collecting system during the dissection.  We then placed 2-0 V-lock sutures in running fashion x2 across the bed of the resection, for hemostasis and to close the collecting system - hemolock clips on both sides of the suture for applying tension. We then removed the bulldog clamps after 20 min of clamp time. Hemostasis was good. We then placed surgiflow into the bed, and applied a surgicel bolster which was sewn in place with the v-lock sutures and again secured with a weck clip.  Hemostasis was good with pneumo-pressure down. We pexied the kidney to the lateral side wall with the v-lock suture to prevent torsion and again secured this with a weck clip.    At this point the specimen was placed into a 10 mm EndoCatch bag. The robot was undocked.   The  specimen was extracted through a low midline incision. The incisions were irrigated. The extraction incision was closed with 0-looped PDS. The skin incisions were all closed with 4-0 Monocryl. The wounds were dressed with surgical glue. The patient was then awakened and taken to the PACU in stable condition.

## 2017-01-16 NOTE — LETTER
Transition Communication Hand-off for Care Transitions to Next Level of Care Provider    Name: Brynn Sprague  MRN #: [id number]  Primary Care Provider: Dallas Parra     Primary Clinic:  [facility] [address] [place] [address]     Reason for Hospitalization:  Left Renal Mass  Renal cancer (H)  Admit Date/Time: 1/16/2017 11:20 AM  Discharge Date:1/17/2017  Payor Source: Payor: MEDICA / Plan: MEDICA [place] / Product Type: HMO /     Readmission Assessment Measure (ATA) Risk Score/category: 3/low risk         Reason for Communication Hand-off Referral: Other continuity of care       Concern for non-adherence with plan of care:   Y/N : no    Follow-up specialty is recommended: Yes    Follow-up plan:  Future Appointments  Date Time Provider Department Center   2/23/2017 1:45 PM Weight, Jairo Castro MD [facility]       Donna Sarabia, RNCC  [phone]    AVS/Discharge Summary is the source of truth; this is a helpful guide for improved communication of patient story

## 2017-01-16 NOTE — ANESTHESIA CARE TRANSFER NOTE
Patient: Brynn Sprague    DAVINCI NEPHRECTOMY PARTIAL (Left Kidney)  Additional InformationProcedure(s):  Davinci Assisted Left Partial Nephrectomy  - Wound Class: II-Clean Contaminated    Diagnosis: Left Renal Mass  Diagnosis Additional Information: No value filed.    Anesthesia Type:   General, ETT     Note:  Airway :Face Mask  Patient transferred to:PACU        Vitals: (Last set prior to Anesthesia Care Transfer)              Electronically Signed By: LÁZARO Adams CRNA  January 16, 2017  5:30 PM

## 2017-01-16 NOTE — IP AVS SNAPSHOT
[address] [address]    Phone:  [phone]                                       After Visit Summary   1/16/2017    Brynn Sprague    MRN: [id number]           After Visit Summary Signature Page     I have received my discharge instructions, and my questions have been answered. I have discussed any challenges I see with this plan with the nurse or doctor.    ..........................................................................................................................................  Patient/Patient Representative Signature      ..........................................................................................................................................  Patient Representative Print Name and Relationship to Patient    ..................................................               ................................................  Date                                            Time    ..........................................................................................................................................  Reviewed by Signature/Title    ...................................................              ..............................................  Date                                                            Time

## 2017-01-16 NOTE — IP AVS SNAPSHOT
MRN:[id number]                      After Visit Summary   1/16/2017    Brynn Sprague    MRN: [id number]           Thank you!     Thank you for choosing [facility] for your care. Our goal is always to provide you with excellent care. Hearing back from our patients is one way we can continue to improve our services. Please take a few minutes to complete the written survey that you may receive in the mail after you visit with us. Thank you!        Patient Information     Date Of Birth          1980        About your hospital stay     You were admitted on:  January 16, 2017 You last received care in the:  [address] [facility]    You were discharged on:  January 19, 2017        Reason for your hospital stay       On 1/16/17 Ms. Sprague underwent robot assisted laparoscopic left partial nephrectomy with Dr. Jairo Cross                  Who to Call     For medical emergencies, please call 911.  For non-urgent questions about your medical care, please call your primary care provider or clinic, [phone]  For questions related to your surgery, please call your surgery clinic        Attending Provider     Provider    Jairo Cross MD       Primary Care Provider Office Phone # Fax #    Dallas Parra PA-C [phone]       [facility] [address] [facility] [address]        After Care Instructions     Activity       Your activity upon discharge:See discharge instructions            Diet       Follow this diet upon discharge: See discharge instructions            Discharge Instructions       Diet:  - Regular    Activity:   - No strenuous exercise for 6 weeks.   - No lifting, pushing, pulling more than 10 pounds for 6 weeks. Take care when pushing with your arms to stand up.  - Do not strain your belly area.  When you bend, sit up or twice, you could strain the area around your incision.    - Do not strain with bowel movements.    - Do not drive until you  can press the brake pedal quickly and fully without pain.   - Do not operate a motor vehicle while taking narcotic pain medications.     Medications:   1) PAIN: Oxycodone is a narcotic medication that has been prescribed for pain.  Narcotics will cause sleepiness and constipation and can become addictive, therefore it is best to stop or reduce them as soon as you can.  Any left over narcotics should be disposed of with an Authorized  for unneeded medications.  Contact your [facility] or [facility] s household trash and recycling service to learn about medication disposal options and guidelines for your area.  If you decide to store this medication at home it should be kept in a locked cabinet to prevent access to children or visitors. To reduce your narcotic use, take both Tylenol (acetaminophen 625mg) and ibuprofen (600mg), alternating between these medications every 3 hours.  These have been prescribed for you.  Do not take more than 4,000mg of Tylenol (acetaminophen/ APAP) from all sources in any 24 hour period since this can cause liver damage.  Do not take more than 2400mg of ibuprofen in any 24 hour period since this can cause kidney damage. Never drive, operate machinery or drink alcoholic beverages while you are taking narcotic pain medications.      2) CONSTIPATION: Pericolace (senna/docusate sodium) can be taken twice daily for prevention of constipation since surgery, pain medications and bladder spasm medications can all make you constipated.  Please reduce or stop pericolace if you develop loose stools. Other over the counter solutions such as prune juice, miralax, fiber products, senna, and dulcolax can also be used. If you are taking the pericolace but still have not had a bowel movement in 3 days, start over-the-counter Milk of Magnesia taken twice daily until you have a nice bowel movement.  Call the office with any concerns.     3) STOMACH MEDICATIONS:  - Take ranitidine twice daily for  14 days then once daily for 14 days then stop -- to prevent sour stomach that can occur after surgery  - Take odansetron 4mg every 6 hours as needed if you still have a little nausea    Incisions:   - Daily showering is important, and you may get incisions wet starting 48 hours after surgery.    - Do not scrub incisions or soak in a bath, pool, hot tub, etc. Until advised by Dr. Cross  -The purple skin glue on your incisions will flake off with time and should not be scrubbed.  Also avoid applying lotions or ointments to these areas.  - It is preferable for the incision to be left uncovered, but you may cover with gauze if needed for comfort or to protect clothing from drainage.                  Follow-up Appointments     Adult [facility] Follow-up and recommended labs and tests       Follow-Up:   - Call your primary care provider to touch base regarding your recent admission.    - Follow up with Dr. Cross as scheduled in Urology Clinic  - Call or return sooner than your regularly scheduled visit if you develop any of the following:  Fever (greater than 101.3F) or flu-like symptoms, uncontrolled pain, uncontrolled nausea or vomiting, as well as increased redness, swelling, or drainage from your wound.    Phone numbers:  - Nursing phone helpline at the Urology Clinic (8A-5P M-F):  [phone].    - Nights or weekends, call [phone] and ask the  to page the urology resident on call.   - For emergencies, always call 911    Appointments on [place] and/or [facility] (with [facility] or [facility] provider or service). Call [phone] if you haven't heard regarding these appointments within 7 days of discharge.                  Your next 10 appointments already scheduled     Feb 23, 2017  1:45 PM   (Arrive by 1:30 PM)   Post-Op with Jairo Cross MD   [facility] ([facility])    [address] [place] [address]  "[id number]   [phone]              Pending Results     Date and Time Order Name Status Description    2017 0920 Surgical pathology exam In process             Statement of Approval     Ordered          17 1249  I have reviewed and agree with all the recommendations and orders detailed in this document.   EFFECTIVE NOW     Approved and electronically signed by:  Leyla Zaldivar PA           17 0830  I have reviewed and agree with all the recommendations and orders detailed in this document.   EFFECTIVE NOW     Approved and electronically signed by:  Leyla Zaldivar PA           17 1250  I have reviewed and agree with all the recommendations and orders detailed in this document.   EFFECTIVE NOW     Approved and electronically signed by:  Murtaza Dickinson MD             Admission Information        Provider Department Dept Phone    2017 Jairo Cross MD [facility] [phone]      Your Vitals Were     Blood Pressure Temperature Respirations    111/68 mmHg 97  F (36.1  C) (Oral) 16    Height Weight BMI (Body Mass Index)    1.6 m (5' 2.99\") 66.2 kg (145 lb 15.1 oz) 25.86 kg/m2    Pulse Oximetry          100%        MyChart Information     [facility] lets you send messages to your doctor, view your test results, renew your prescriptions, schedule appointments and more. To sign up, go to [email or web address] . Click on \"Log in\" on the left side of the screen, which will take you to the Welcome page. Then click on \"Sign up Now\" on the right side of the page.     You will be asked to enter the access code listed below, as well as some personal information. Please follow the directions to create your username and password.     Your access code is: [email or web address]  Expires: 2017  7:55 AM     Your access code will  in 90 days. If you need help or a new code, please call your [place] clinic or [phone].        Care EveryWhere ID     This is your Care EveryWhere ID. This " could be used by other organizations to access your [facility] medical records  [id number]           Review of your medicines      START taking        Dose / Directions    ondansetron 4 MG ODT tab   Commonly known as:  ZOFRAN-ODT   Used for:  Renal cancer, left (H)        Dose:  4 mg   Take 1 tablet (4 mg) by mouth every 6 hours as needed for nausea   Quantity:  10 tablet   Refills:  0       oxyCODONE 5 MG IR tablet   Commonly known as:  ROXICODONE   Used for:  Renal cancer, left (H)        Dose:  5-10 mg   Take 1-2 tablets (5-10 mg) by mouth every 3 hours as needed for moderate to severe pain   Quantity:  30 tablet   Refills:  0       ranitidine 150 MG tablet   Commonly known as:  ZANTAC   Used for:  Dyspepsia        Dose:  150 mg   Take 1 tablet (150 mg) by mouth 2 times daily For 14 days then once daily for 14 days then stop   Quantity:  42 tablet   Refills:  0       senna-docusate 8.6-50 MG per tablet   Commonly known as:  SENOKOT-S;PERICOLACE   Used for:  Renal cancer, left (H)        Dose:  1 tablet   Take 1 tablet by mouth 2 times daily   Quantity:  45 tablet   Refills:  1         CONTINUE these medicines which may have CHANGED, or have new prescriptions. If we are uncertain of the size of tablets/capsules you have at home, strength may be listed as something that might have changed.        Dose / Directions    * acetaminophen 325 MG tablet   Commonly known as:  TYLENOL   This may have changed:  Another medication with the same name was added. Make sure you understand how and when to take each.        Dose:  325 mg   Take 325 mg by mouth as needed   Refills:  0       * acetaminophen 325 MG tablet   Commonly known as:  TYLENOL   This may have changed:  You were already taking a medication with the same name, and this prescription was added. Make sure you understand how and when to take each.   Used for:  Renal cancer, left (H)        Dose:  650 mg   Take 2 tablets (650 mg) by mouth every 6 hours as needed for  other (surgical pain)   Quantity:  100 tablet   Refills:  1       ibuprofen 600 MG tablet   Commonly known as:  ADVIL/MOTRIN   This may have changed:    - medication strength  - how much to take  - when to take this   Used for:  Renal cancer, left (H)        Dose:  600 mg   Take 1 tablet (600 mg) by mouth every 6 hours as needed   Quantity:  30 tablet   Refills:  0       * Notice:  This list has 2 medication(s) that are the same as other medications prescribed for you. Read the directions carefully, and ask your doctor or other care provider to review them with you.      CONTINUE these medicines which have NOT CHANGED        Dose / Directions    levonorgestrel 20 MCG/24HR IUD   Commonly known as:  MIRENA        Dose:  1 each   1 each by Intrauterine route once   Refills:  0            Where to get your medicines      These medications were sent to [facility] [place], [place] - [address] [place] [address]  [address] [facility] [address]     Phone:  [phone]    - acetaminophen 325 MG tablet  - ibuprofen 600 MG tablet  - ondansetron 4 MG ODT tab  - ranitidine 150 MG tablet  - senna-docusate 8.6-50 MG per tablet      Some of these will need a paper prescription and others can be bought over the counter. Ask your nurse if you have questions.     Bring a paper prescription for each of these medications    - oxyCODONE 5 MG IR tablet             Protect others around you: Learn how to safely use, store and throw away your medicines at www.disposemymeds.org.             Medication List: This is a list of all your medications and when to take them. Check marks below indicate your daily home schedule. Keep this list as a reference.      Medications           Morning Afternoon Evening Bedtime As Needed    * acetaminophen 325 MG tablet   Commonly known as:  TYLENOL   Take 325 mg by mouth as needed   Last time this was given:  975 mg on 1/19/2017  2:23 PM                                * acetaminophen  325 MG tablet   Commonly known as:  TYLENOL   Take 2 tablets (650 mg) by mouth every 6 hours as needed for other (surgical pain)   Last time this was given:  975 mg on 1/19/2017  2:23 PM                                ibuprofen 600 MG tablet   Commonly known as:  ADVIL/MOTRIN   Take 1 tablet (600 mg) by mouth every 6 hours as needed   Last time this was given:  600 mg on 1/19/2017 12:41 PM                                levonorgestrel 20 MCG/24HR IUD   Commonly known as:  MIRENA   1 each by Intrauterine route once                                ondansetron 4 MG ODT tab   Commonly known as:  ZOFRAN-ODT   Take 1 tablet (4 mg) by mouth every 6 hours as needed for nausea                                oxyCODONE 5 MG IR tablet   Commonly known as:  ROXICODONE   Take 1-2 tablets (5-10 mg) by mouth every 3 hours as needed for moderate to severe pain   Last time this was given:  5 mg on 1/18/2017 10:11 PM                                ranitidine 150 MG tablet   Commonly known as:  ZANTAC   Take 1 tablet (150 mg) by mouth 2 times daily For 14 days then once daily for 14 days then stop   Last time this was given:  150 mg on 1/19/2017  8:01 AM                                senna-docusate 8.6-50 MG per tablet   Commonly known as:  SENOKOT-S;PERICOLACE   Take 1 tablet by mouth 2 times daily   Last time this was given:  1 tablet on 1/19/2017  8:01 AM                                * Notice:  This list has 2 medication(s) that are the same as other medications prescribed for you. Read the directions carefully, and ask your doctor or other care provider to review them with you.

## 2017-01-17 LAB
ANION GAP SERPL CALCULATED.3IONS-SCNC: 7 MMOL/L (ref 3–14)
BUN SERPL-MCNC: 6 MG/DL (ref 7–30)
CALCIUM SERPL-MCNC: 7.6 MG/DL (ref 8.5–10.1)
CHLORIDE SERPL-SCNC: 108 MMOL/L (ref 94–109)
CO2 SERPL-SCNC: 22 MMOL/L (ref 20–32)
CREAT SERPL-MCNC: 0.55 MG/DL (ref 0.52–1.04)
ERYTHROCYTE [DISTWIDTH] IN BLOOD BY AUTOMATED COUNT: 13.4 % (ref 10–15)
GFR SERPL CREATININE-BSD FRML MDRD: ABNORMAL ML/MIN/1.7M2
GLUCOSE SERPL-MCNC: 141 MG/DL (ref 70–99)
HCT VFR BLD AUTO: 35 % (ref 35–47)
HGB BLD-MCNC: 11.4 G/DL (ref 11.7–15.7)
MCH RBC QN AUTO: 29.2 PG (ref 26.5–33)
MCHC RBC AUTO-ENTMCNC: 32.6 G/DL (ref 31.5–36.5)
MCV RBC AUTO: 90 FL (ref 78–100)
PLATELET # BLD AUTO: 172 10E9/L (ref 150–450)
POTASSIUM SERPL-SCNC: 3.9 MMOL/L (ref 3.4–5.3)
RBC # BLD AUTO: 3.91 10E12/L (ref 3.8–5.2)
SODIUM SERPL-SCNC: 137 MMOL/L (ref 133–144)
WBC # BLD AUTO: 10.7 10E9/L (ref 4–11)

## 2017-01-17 PROCEDURE — 80048 BASIC METABOLIC PNL TOTAL CA: CPT | Performed by: UROLOGY

## 2017-01-17 PROCEDURE — 36415 COLL VENOUS BLD VENIPUNCTURE: CPT | Performed by: UROLOGY

## 2017-01-17 PROCEDURE — 25000125 ZZHC RX 250: Performed by: UROLOGY

## 2017-01-17 PROCEDURE — 85027 COMPLETE CBC AUTOMATED: CPT | Performed by: UROLOGY

## 2017-01-17 PROCEDURE — 25000132 ZZH RX MED GY IP 250 OP 250 PS 637: Performed by: UROLOGY

## 2017-01-17 PROCEDURE — 12000008 ZZH R&B INTERMEDIATE UMMC

## 2017-01-17 RX ORDER — OXYCODONE HYDROCHLORIDE 5 MG/1
5-10 TABLET ORAL
Qty: 30 TABLET | Refills: 0 | Status: SHIPPED | OUTPATIENT
Start: 2017-01-17 | End: 2017-09-07

## 2017-01-17 RX ORDER — IBUPROFEN 200 MG
600 TABLET ORAL EVERY 6 HOURS PRN
Qty: 30 TABLET | Refills: 0 | Status: SHIPPED | OUTPATIENT
Start: 2017-01-17 | End: 2017-01-18

## 2017-01-17 RX ORDER — ACETAMINOPHEN 325 MG/1
650 TABLET ORAL EVERY 6 HOURS PRN
Qty: 100 TABLET | Refills: 1 | Status: SHIPPED | OUTPATIENT
Start: 2017-01-17 | End: 2017-09-21

## 2017-01-17 RX ORDER — AMOXICILLIN 250 MG
1 CAPSULE ORAL 2 TIMES DAILY
Qty: 45 TABLET | Refills: 1 | Status: SHIPPED | OUTPATIENT
Start: 2017-01-17 | End: 2017-09-21

## 2017-01-17 RX ADMIN — ACETAMINOPHEN 975 MG: 325 TABLET, FILM COATED ORAL at 13:52

## 2017-01-17 RX ADMIN — OXYCODONE HYDROCHLORIDE 10 MG: 5 TABLET ORAL at 17:55

## 2017-01-17 RX ADMIN — KETOROLAC TROMETHAMINE 30 MG: 30 INJECTION, SOLUTION INTRAMUSCULAR at 02:18

## 2017-01-17 RX ADMIN — OXYCODONE HYDROCHLORIDE 10 MG: 5 TABLET ORAL at 10:43

## 2017-01-17 RX ADMIN — ACETAMINOPHEN 975 MG: 325 TABLET, FILM COATED ORAL at 22:35

## 2017-01-17 RX ADMIN — KETOROLAC TROMETHAMINE 30 MG: 30 INJECTION, SOLUTION INTRAMUSCULAR at 09:51

## 2017-01-17 RX ADMIN — ACETAMINOPHEN 975 MG: 325 TABLET, FILM COATED ORAL at 06:20

## 2017-01-17 RX ADMIN — OXYCODONE HYDROCHLORIDE 10 MG: 5 TABLET ORAL at 13:52

## 2017-01-17 RX ADMIN — OXYCODONE HYDROCHLORIDE 5 MG: 5 TABLET ORAL at 22:21

## 2017-01-17 ASSESSMENT — PAIN DESCRIPTION - DESCRIPTORS: DESCRIPTORS: SORE

## 2017-01-17 NOTE — PLAN OF CARE
Problem: Individualization  Goal: Patient Preferences  Afeb, vitals stable, 02 sats % on room air, states pain it tolerable, on scheduled toradol and tylenol with relief, abd lap sites dry, intact and dermabonded, belly round with hypo bowel sounds, lungs clear diminished in bases, rutledge to gravity with adequate but marginal output, iv infusing, offers no further c/o,SO at bedside, appeared to rest/sleep between cares

## 2017-01-17 NOTE — PROGRESS NOTES
Urology Daily Progress Note  1/17/2017    24 hour events/Subjective:    No acute events overnight. No complaints on AM rounds. Pain controlled with oral medications. Has not yet been up out of bed. Tolerating clears.     O:  Temp:  [96.9  F (36.1  C)-98.2  F (36.8  C)] 96.9  F (36.1  C)  Heart Rate:  [] 91  Resp:  [14-16] 16  BP: (100-124)/(54-86) 109/57 mmHg  SpO2:  [99 %-100 %] 100 %  General: Alert, interactive, NAD  Resp: Breathing is non-labored on room air  Abdomen: Soft, nontender, nondistended. Incisions C/D/I with dermabond.  : Rutledge in place with clear urine  Extremities: No LE edema  Skin: Warm and dry, no jaundice or rash     Ins & Outs (24 hours/since [place])  UOP  1000/310    Labs/Imaging  BMP  Recent Labs  Lab 01/16/17  1745      POTASSIUM 3.7   CHLORIDE 107   MAXIMINO 7.9*   CO2 26   BUN 4*   CR 0.54   *     CBC  Recent Labs  Lab 01/16/17  1745 01/16/17  1213   WBC 11.5*  --    RBC 3.93  --    HGB 11.3* 12.4   HCT 34.7*  --    MCV 88  --    MCH 28.8  --    MCHC 32.6  --    RDW 13.2  --      --      INR  Recent Labs  Lab 01/13/17  1015   INR 0.99          Assessment/Plan  36 year old yo female POD #1 s/p robotic left partial nephrectomy.    PLAN:  Neuro/Pain: Continue current regimen  CV/PULM: No acute issues. Encourage IS, ambulation  GI/FEN: Regular diet. mIVF off if tolerating diet.  : UOP marginal but appropriate. D/C rutledge.   Heme: No active issues. AM labs pending  ID: Periop abx  Endocrine: No issues  Activity: Up ad deric  Ppx: SCDs, ambulation, IS  Dispo: 7B; home today vs tomorrow    Seen and examined with the chief resident. Will discuss with Dr. Cross.    --    Jasen Billy MD   Urology PGY3  Pager: [phone]    6:13 AM, 1/17/2017       Contacting the Urology Team     Please use the following job codes to reach the Urology Team. Note that you must use an in house phone and that job codes cannot receive text pages.     On weekdays, dial [phone] (or star-star-star 777 on  the new [facility] telephones) then 0817 to reach the Adult Urology resident or PA on call    On weekdays, dial [phone] (or star-star-star 777 on the new [facility] telephones) then 0818 to reach the Pediatric Urology resident    On weeknights and weekends, dial [phone] (or star-star-star 777 on the new [facility] telephones) then 0039 to reach the Urology resident on call (for both Adult and Pediatrics)

## 2017-01-17 NOTE — PLAN OF CARE
Problem: Individualization  Goal: Patient Preferences  Outcome: No Change  Pt had Davinci assisted left partial Nephrectomy today, she is slightly drowsy, easily arousable, other vitals are stable, c/o pain- tolerable with toradol and dilaudid. Midline incisions derma-bonded C/D/I. Reynoso patent- marginal urine output- we'll continue to monitor.

## 2017-01-17 NOTE — PROGRESS NOTES
Focus:  Status  Data:      Vs stable afebrile.  Pt has been walking in the hallway 2x today.  Reynoso out noon, pt is encouraged to drink more fluids et be more active  Will continue to monitor notify md with problems

## 2017-01-17 NOTE — ANESTHESIA POSTPROCEDURE EVALUATION
Patient: Brynn Sprague    DAVINCI NEPHRECTOMY PARTIAL (Left Kidney)  Additional InformationProcedure(s):  Davinci Assisted Left Partial Nephrectomy  - Wound Class: II-Clean Contaminated    Diagnosis:Left Renal Mass  Diagnosis Additional Information: No value filed.    Anesthesia Type:  General, ETT    Note:  Anesthesia Post Evaluation    Last vitals:  Filed Vitals:    01/16/17 1830 01/16/17 1845 01/16/17 1900   BP: 102/62 106/64 101/59   Temp:      Resp: 16     SpO2: 100% 100%        Electronically Signed By: Danica Diggs MD  January 16, 2017  7:09 PM

## 2017-01-17 NOTE — DISCHARGE SUMMARY
[facility] Discharge Summary    Patient: Brynn Sprague    MRN: [id number]   : 1980         Date of Admission:  2017   Date of Discharge::  2017  Admitting Physician:  Jairo Cross MD  Discharge Physician:  Irma Zaldivar PA-C             Admission Diagnoses:   Left Renal Mass, chromophobe RCC    Past Medical History   Diagnosis Date     Normal pregnancy ,,,     Left renal mass      Chronic neutropenia (H)      possibly benign essential neutropenia     Renal cell carcinoma (H)      GERD (gastroesophageal reflux disease)      Pulmonary nodules              Discharge Diagnosis:   Left Renal Mass, with final pathology showing:    - Chromophobe renal cell carcinoma   - Tumor size: 8.7 cm and limited to the kidney  - Tumor extent: Limited to the kidney   - There were no sarcomatoid features or necrosis.  There was no lymphovascular invasion and margins were negative.   - Pathologic stage: pT2a       Past Medical History   Diagnosis Date     Normal pregnancy ,,,     Left renal mass      Chronic neutropenia (H)      possibly benign essential neutropenia     Renal cell carcinoma (H)      GERD (gastroesophageal reflux disease)      Pulmonary nodules             Procedures:   Procedure(s): 17 - Robot assisted laparoscopic left partial nephrectomy  Dr. Jairo Cross            Medications Prior to Admission:     Prescriptions prior to admission   Medication Sig Dispense Refill Last Dose     acetaminophen (TYLENOL) 325 MG tablet Take 325 mg by mouth as needed   Past Month at Unknown time     levonorgestrel (MIRENA) 20 MCG/24HR IUD 1 each by Intrauterine route once   Taking             Discharge Medications:     Current Discharge Medication List      START taking these medications    Details   oxyCODONE (ROXICODONE) 5 MG IR tablet Take 1-2 tablets (5-10 mg) by mouth every 3 hours as needed for moderate to severe pain  Qty: 30 tablet, Refills: 0    Associated  Diagnoses: Renal cancer, left (H)      !! acetaminophen (TYLENOL) 325 MG tablet Take 2 tablets (650 mg) by mouth every 6 hours as needed for other (surgical pain)  Qty: 100 tablet, Refills: 1    Associated Diagnoses: Renal cancer, left (H)       !! - Potential duplicate medications found. Please discuss with provider.      CONTINUE these medications which have CHANGED    Details   ibuprofen (ADVIL/MOTRIN) 200 MG tablet Take 3 tablets (600 mg) by mouth every 6 hours as needed  Qty: 30 tablet, Refills: 0    Associated Diagnoses: Renal cancer, left (H)         CONTINUE these medications which have NOT CHANGED    Details   !! acetaminophen (TYLENOL) 325 MG tablet Take 325 mg by mouth as needed      levonorgestrel (MIRENA) 20 MCG/24HR IUD 1 each by Intrauterine route once       !! - Potential duplicate medications found. Please discuss with provider.                Consultations:   Consultation during this admission received: None          Brief History of Illness:   Reason for admission requiring a surgical or invasive procedure:   Left Renal Mass   The patient underwent the following procedure(s):   See above   There were no immediate complications during this procedure.    Please refer to the full operative summary for details.           Hospital Course:   The patient's hospital course was remarkable for pain, nausea and emesis.  For this, Ms. Sprague remained inpatient for anti-emetics and IV fluids.  Brynn Sprague otherwise recovered as anticipated.    On POD #3 she was ambulating without assitance, tolerating the discharge diet, had pain controlled with PO medications to go home with, and was requiring no IV medications or fluids. Her Reynoso had been removed and she was voiding without difficulty. She was afebrile and without leukocytosis. Her labs were stable -  Creatinine was 0.61mg/dL and Hemoglobin was 10.1g/dL. She was discharged home with appropriate contact information, follow-up and instructions as seen  below in the discharge paperwork.         Discharge Instructions and Follow-Up:   Diet:  - Regular    Activity:   - No strenuous exercise for 6 weeks.   - No lifting, pushing, pulling more than 10 pounds for 6 weeks. Take care when pushing with your arms to stand up.  - Do not strain your belly area.  When you bend, sit up or twice, you could strain the area around your incision.    - Do not strain with bowel movements.    - Do not drive until you can press the brake pedal quickly and fully without pain.   - Do not operate a motor vehicle while taking narcotic pain medications.     Medications:   1) PAIN: Oxycodone is a narcotic medication that has been prescribed for pain.  Narcotics will cause sleepiness and constipation and can become addictive, therefore it is best to stop or reduce them as soon as you can.  Any left over narcotics should be disposed of with an Authorized  for unneeded medications.  Contact your [facility] s or [facility] s household trash and recycling service to learn about medication disposal options and guidelines for your area.  If you decide to store this medication at home it should be kept in a locked cabinet to prevent access to children or visitors. To reduce your narcotic use, take both Tylenol (acetaminophen 625mg) and ibuprofen (600mg), alternating between these medications every 3 hours.  These have been prescribed for you.  Do not take more than 4,000mg of Tylenol (acetaminophen/ APAP) from all sources in any 24 hour period since this can cause liver damage.  Do not take more than 2400mg of ibuprofen in any 24 hour period since this can cause kidney damage. Never drive, operate machinery or drink alcoholic beverages while you are taking narcotic pain medications.      2) CONSTIPATION: Pericolace (senna/docusate sodium) can be taken twice daily for prevention of constipation since surgery, pain medications and bladder spasm medications can all make you constipated.   Please reduce or stop pericolace if you develop loose stools. Other over the counter solutions such as prune juice, miralax, fiber products, senna, and dulcolax can also be used. If you are taking the pericolace but still have not had a bowel movement in 3 days, start over-the-counter Milk of Magnesia taken twice daily until you have a nice bowel movement.  Call the office with any concerns.     3) STOMACH MEDICATIONS:  - Take ranitidine twice daily for 14 days then once daily for 14 days then stop -- to prevent sour stomach that can occur after surgery  - Take odansetron 4mg every 6 hours as needed if you still have a little nausea    Incisions:   - Daily showering is important, and you may get incisions wet starting 48 hours after surgery.    - Do not scrub incisions or soak in a bath, pool, hot tub, etc. Until advised by Dr. Cross  -The purple skin glue on your incisions will flake off with time and should not be scrubbed.  Also avoid applying lotions or ointments to these areas.  - It is preferable for the incision to be left uncovered, but you may cover with gauze if needed for comfort or to protect clothing from drainage.    Follow-Up:   - Call your primary care provider to touch base regarding your recent admission.    - Follow up with Dr. Cross as scheduled in Urology Clinic  - Call or return sooner than your regularly scheduled visit if you develop any of the following:  Fever (greater than 101.3F) or flu-like symptoms, uncontrolled pain, uncontrolled nausea or vomiting, as well as increased redness, swelling, or drainage from your wound.    Phone numbers:  - Nursing phone helpline at the Urology Clinic (8A-5P M-F):  [phone].    - Nights or weekends, call [phone] and ask the  to page the urology resident on call.   - For emergencies, always call [phone]         Discharge Disposition:   Discharged to home      Attestation: I have reviewed today's vital signs, notes, medications, labs and  imaging.    Irma Zaldivar PA-C  Urology Physician Assistant  Personal Pager: [phone]    Please call Job Code:   [phone] to reach the Urology resident or PA on call - Weekdays  [phone] to reach the Urology resident or PA on call - Weeknights and weekends    January 19, 2017

## 2017-01-17 NOTE — PROGRESS NOTES
Focus: status  Data:    Vs stable afebrile.  Pt did get up this morning et did walk in the hallway, came back to room et did use is .  Is sitting up in chair waiting for brkfst  Will continue to monitor notify md with problems

## 2017-01-18 LAB
ANION GAP SERPL CALCULATED.3IONS-SCNC: 9 MMOL/L (ref 3–14)
BUN SERPL-MCNC: 5 MG/DL (ref 7–30)
CALCIUM SERPL-MCNC: 7.5 MG/DL (ref 8.5–10.1)
CHLORIDE SERPL-SCNC: 107 MMOL/L (ref 94–109)
CO2 SERPL-SCNC: 23 MMOL/L (ref 20–32)
CREAT SERPL-MCNC: 0.57 MG/DL (ref 0.52–1.04)
ERYTHROCYTE [DISTWIDTH] IN BLOOD BY AUTOMATED COUNT: 13.7 % (ref 10–15)
GFR SERPL CREATININE-BSD FRML MDRD: ABNORMAL ML/MIN/1.7M2
GLUCOSE BLDC GLUCOMTR-MCNC: 120 MG/DL (ref 70–99)
GLUCOSE SERPL-MCNC: 125 MG/DL (ref 70–99)
HCT VFR BLD AUTO: 32.1 % (ref 35–47)
HGB BLD-MCNC: 10.3 G/DL (ref 11.7–15.7)
MCH RBC QN AUTO: 29.1 PG (ref 26.5–33)
MCHC RBC AUTO-ENTMCNC: 32.1 G/DL (ref 31.5–36.5)
MCV RBC AUTO: 91 FL (ref 78–100)
PLATELET # BLD AUTO: 153 10E9/L (ref 150–450)
POTASSIUM SERPL-SCNC: 3.7 MMOL/L (ref 3.4–5.3)
RBC # BLD AUTO: 3.54 10E12/L (ref 3.8–5.2)
SODIUM SERPL-SCNC: 139 MMOL/L (ref 133–144)
WBC # BLD AUTO: 5.2 10E9/L (ref 4–11)

## 2017-01-18 PROCEDURE — 25000132 ZZH RX MED GY IP 250 OP 250 PS 637: Performed by: PHYSICIAN ASSISTANT

## 2017-01-18 PROCEDURE — 25000132 ZZH RX MED GY IP 250 OP 250 PS 637: Performed by: UROLOGY

## 2017-01-18 PROCEDURE — 12000008 ZZH R&B INTERMEDIATE UMMC

## 2017-01-18 PROCEDURE — 25000125 ZZHC RX 250: Performed by: PHYSICIAN ASSISTANT

## 2017-01-18 PROCEDURE — 25000128 H RX IP 250 OP 636: Performed by: PHYSICIAN ASSISTANT

## 2017-01-18 PROCEDURE — 25000125 ZZHC RX 250: Performed by: UROLOGY

## 2017-01-18 PROCEDURE — 85027 COMPLETE CBC AUTOMATED: CPT | Performed by: STUDENT IN AN ORGANIZED HEALTH CARE EDUCATION/TRAINING PROGRAM

## 2017-01-18 PROCEDURE — 36415 COLL VENOUS BLD VENIPUNCTURE: CPT | Performed by: STUDENT IN AN ORGANIZED HEALTH CARE EDUCATION/TRAINING PROGRAM

## 2017-01-18 PROCEDURE — 80048 BASIC METABOLIC PNL TOTAL CA: CPT | Performed by: STUDENT IN AN ORGANIZED HEALTH CARE EDUCATION/TRAINING PROGRAM

## 2017-01-18 PROCEDURE — S0028 INJECTION, FAMOTIDINE, 20 MG: HCPCS | Performed by: PHYSICIAN ASSISTANT

## 2017-01-18 PROCEDURE — 00000146 ZZHCL STATISTIC GLUCOSE BY METER IP

## 2017-01-18 PROCEDURE — 25800025 ZZH RX 258: Performed by: PHYSICIAN ASSISTANT

## 2017-01-18 RX ORDER — BISACODYL 10 MG
10 SUPPOSITORY, RECTAL RECTAL DAILY PRN
Status: DISCONTINUED | OUTPATIENT
Start: 2017-01-18 | End: 2017-01-18

## 2017-01-18 RX ORDER — BISACODYL 10 MG
10 SUPPOSITORY, RECTAL RECTAL ONCE
Status: COMPLETED | OUTPATIENT
Start: 2017-01-18 | End: 2017-01-18

## 2017-01-18 RX ORDER — IBUPROFEN 600 MG/1
600 TABLET, FILM COATED ORAL EVERY 6 HOURS PRN
Qty: 30 TABLET | Refills: 0 | Status: SHIPPED | OUTPATIENT
Start: 2017-01-18 | End: 2017-09-21

## 2017-01-18 RX ORDER — AMOXICILLIN 250 MG
1 CAPSULE ORAL 2 TIMES DAILY
Status: DISCONTINUED | OUTPATIENT
Start: 2017-01-18 | End: 2017-01-19 | Stop reason: HOSPADM

## 2017-01-18 RX ORDER — KETOROLAC TROMETHAMINE 30 MG/ML
30 INJECTION, SOLUTION INTRAMUSCULAR; INTRAVENOUS EVERY 6 HOURS
Status: COMPLETED | OUTPATIENT
Start: 2017-01-18 | End: 2017-01-19

## 2017-01-18 RX ORDER — SODIUM CHLORIDE 9 MG/ML
INJECTION, SOLUTION INTRAVENOUS CONTINUOUS
Status: DISCONTINUED | OUTPATIENT
Start: 2017-01-18 | End: 2017-01-19 | Stop reason: HOSPADM

## 2017-01-18 RX ORDER — SCOLOPAMINE TRANSDERMAL SYSTEM 1 MG/1
1 PATCH, EXTENDED RELEASE TRANSDERMAL
Status: DISCONTINUED | OUTPATIENT
Start: 2017-01-18 | End: 2017-01-19 | Stop reason: HOSPADM

## 2017-01-18 RX ADMIN — ACETAMINOPHEN 975 MG: 325 TABLET, FILM COATED ORAL at 23:26

## 2017-01-18 RX ADMIN — PROCHLORPERAZINE MALEATE 5 MG: 5 TABLET, FILM COATED ORAL at 12:13

## 2017-01-18 RX ADMIN — RANITIDINE HYDROCHLORIDE 150 MG: 150 TABLET, FILM COATED ORAL at 20:42

## 2017-01-18 RX ADMIN — OXYCODONE HYDROCHLORIDE 5 MG: 5 TABLET ORAL at 12:13

## 2017-01-18 RX ADMIN — FAMOTIDINE 20 MG: 10 INJECTION, SOLUTION INTRAVENOUS at 14:48

## 2017-01-18 RX ADMIN — ACETAMINOPHEN 975 MG: 325 TABLET, FILM COATED ORAL at 13:21

## 2017-01-18 RX ADMIN — SCOPOLAMINE 1 PATCH: 1 PATCH, EXTENDED RELEASE TRANSDERMAL at 14:58

## 2017-01-18 RX ADMIN — SENNOSIDES AND DOCUSATE SODIUM 1 TABLET: 8.6; 5 TABLET ORAL at 20:42

## 2017-01-18 RX ADMIN — SODIUM CHLORIDE, POTASSIUM CHLORIDE, SODIUM LACTATE AND CALCIUM CHLORIDE 1000 ML: 600; 310; 30; 20 INJECTION, SOLUTION INTRAVENOUS at 13:19

## 2017-01-18 RX ADMIN — OXYCODONE HYDROCHLORIDE 10 MG: 5 TABLET ORAL at 08:31

## 2017-01-18 RX ADMIN — BISACODYL 10 MG: 10 SUPPOSITORY RECTAL at 14:47

## 2017-01-18 RX ADMIN — OXYCODONE HYDROCHLORIDE 5 MG: 5 TABLET ORAL at 22:11

## 2017-01-18 RX ADMIN — KETOROLAC TROMETHAMINE 30 MG: 30 INJECTION, SOLUTION INTRAMUSCULAR at 19:10

## 2017-01-18 RX ADMIN — ACETAMINOPHEN 975 MG: 325 TABLET, FILM COATED ORAL at 06:27

## 2017-01-18 RX ADMIN — OXYCODONE HYDROCHLORIDE 5 MG: 5 TABLET ORAL at 02:17

## 2017-01-18 RX ADMIN — ONDANSETRON 4 MG: 2 INJECTION INTRAMUSCULAR; INTRAVENOUS at 08:33

## 2017-01-18 RX ADMIN — OXYCODONE HYDROCHLORIDE 10 MG: 5 TABLET ORAL at 05:45

## 2017-01-18 RX ADMIN — KETOROLAC TROMETHAMINE 30 MG: 30 INJECTION, SOLUTION INTRAMUSCULAR at 13:21

## 2017-01-18 RX ADMIN — SODIUM CHLORIDE: 9 INJECTION, SOLUTION INTRAVENOUS at 13:19

## 2017-01-18 NOTE — PLAN OF CARE
"Problem: Goal Outcome Summary  Goal: Goal Outcome Summary  Outcome: No Change  /73 mmHg  Temp(Src) 96.9  F (36.1  C) (Oral)  Resp 16  Ht 1.6 m (5' 2.99\")  Wt 66.2 kg (145 lb 15.1 oz)  BMI 25.86 kg/m2  SpO2 100%    3228-0002: VSS, Afebrile.  Pt calm and cooperative overnight.  No reports of nausea or SOB.  Pt tolerating regular diet.  Pt requesting pain medications as scheduled throughout the night for \"maintenance\".  Given 5mg for last dose pt requested 10mg instead.  Pt ambulating to restroom with 's assist, voiding adequately.  Abd incisions with liquid bandage, clean dry and intact.  Pt otherwise comfortable, continue with POC.         "

## 2017-01-18 NOTE — PROGRESS NOTES
Urology Daily Progress Note  1/17/2017    24 hour events/Subjective:    No acute events overnight.   Ambulating  Tolerating Diet    O:  Temp:  [96.6  F (35.9  C)-97.8  F (36.6  C)] 96.9  F (36.1  C)  Heart Rate:  [76-89] 80  Resp:  [16] 16  BP: ()/(44-73) 114/73 mmHg  SpO2:  [99 %-100 %] 100 %  General: Alert, interactive, NAD  Resp: Breathing is non-labored on room air  Abdomen: Soft, nontender, nondistended. Incisions C/D/I with dermabond.  : Rutledge in place with clear urine  Extremities: No LE edema  Skin: Warm and dry, no jaundice or rash     Ins & Outs (24 hours/since [place])     I/O last 3 completed shifts:  In: 1993 [P.O.:800; I.V.:1193]  Out: 1535 [Urine:1535]    Labs/Imaging  BMP    Recent Labs  Lab 01/17/17  0810 01/16/17  1745    140   POTASSIUM 3.9 3.7   CHLORIDE 108 107   MAXIMINO 7.6* 7.9*   CO2 22 26   BUN 6* 4*   CR 0.55 0.54   * 113*     CBC    Recent Labs  Lab 01/17/17  0810 01/16/17  1745 01/16/17  1213   WBC 10.7 11.5*  --    RBC 3.91 3.93  --    HGB 11.4* 11.3* 12.4   HCT 35.0 34.7*  --    MCV 90 88  --    MCH 29.2 28.8  --    MCHC 32.6 32.6  --    RDW 13.4 13.2  --     152  --      INR    Recent Labs  Lab 01/13/17  1015   INR 0.99          Assessment/Plan  36 year old yo female POD #2 s/p robotic left partial nephrectomy.    PLAN:  Neuro/Pain: Continue current regimen  CV/PULM: No acute issues. Encourage IS, ambulation  GI/FEN: Regular diet. mIVF off if tolerating diet.  : rutledge out yesterday. uop adequate   Heme: No active issues. AM labs pending  ID: Periop abx  Endocrine: No issues  Activity: Up ad deric  Ppx: SCDs, ambulation, IS  Dispo: 7B; home today    Seen and examined with the chief resident. Will discuss with Dr. Cross.    --    Elidia Ritter         Contacting the Urology Team     Please use the following job codes to reach the Urology Team. Note that you must use an in house phone and that job codes cannot receive text pages.     On weekdays, dial [phone] (or  star-star-star 777 on the new [employer] telephones) then 0817 to reach the Adult Urology resident or PA on call    On weekdays, dial [phone] (or star-star-star 777 on the new [employer] telephones) then 0818 to reach the Pediatric Urology resident    On weeknights and weekends, dial [phone] (or star-star-star 777 on the new [employer] telephones) then [phone] to reach the Urology resident on call (for both Adult and Pediatrics)

## 2017-01-18 NOTE — PROGRESS NOTES
Focus:  Status  Data:     Pt woke up this morning et was nauseated.  Zofran iv given, pt also c/o of surgical pain oxycodone given per orders.  Pt feeling better.

## 2017-01-19 ENCOUNTER — CARE COORDINATION (OUTPATIENT)
Dept: CARE COORDINATION | Facility: CLINIC | Age: 37
End: 2017-01-19

## 2017-01-19 VITALS
WEIGHT: 145.94 LBS | BODY MASS INDEX: 25.86 KG/M2 | TEMPERATURE: 97 F | RESPIRATION RATE: 16 BRPM | OXYGEN SATURATION: 100 % | SYSTOLIC BLOOD PRESSURE: 111 MMHG | DIASTOLIC BLOOD PRESSURE: 68 MMHG | HEIGHT: 63 IN

## 2017-01-19 LAB
ANION GAP SERPL CALCULATED.3IONS-SCNC: 6 MMOL/L (ref 3–14)
BUN SERPL-MCNC: 5 MG/DL (ref 7–30)
CALCIUM SERPL-MCNC: 7.3 MG/DL (ref 8.5–10.1)
CHLORIDE SERPL-SCNC: 110 MMOL/L (ref 94–109)
CO2 SERPL-SCNC: 25 MMOL/L (ref 20–32)
CREAT SERPL-MCNC: 0.61 MG/DL (ref 0.52–1.04)
ERYTHROCYTE [DISTWIDTH] IN BLOOD BY AUTOMATED COUNT: 14 % (ref 10–15)
GFR SERPL CREATININE-BSD FRML MDRD: ABNORMAL ML/MIN/1.7M2
GLUCOSE SERPL-MCNC: 88 MG/DL (ref 70–99)
HCT VFR BLD AUTO: 31.3 % (ref 35–47)
HGB BLD-MCNC: 10.1 G/DL (ref 11.7–15.7)
MCH RBC QN AUTO: 29.5 PG (ref 26.5–33)
MCHC RBC AUTO-ENTMCNC: 32.3 G/DL (ref 31.5–36.5)
MCV RBC AUTO: 92 FL (ref 78–100)
PLATELET # BLD AUTO: 143 10E9/L (ref 150–450)
POTASSIUM SERPL-SCNC: 4.1 MMOL/L (ref 3.4–5.3)
RBC # BLD AUTO: 3.42 10E12/L (ref 3.8–5.2)
SODIUM SERPL-SCNC: 141 MMOL/L (ref 133–144)
WBC # BLD AUTO: 2.9 10E9/L (ref 4–11)

## 2017-01-19 PROCEDURE — 80048 BASIC METABOLIC PNL TOTAL CA: CPT | Performed by: STUDENT IN AN ORGANIZED HEALTH CARE EDUCATION/TRAINING PROGRAM

## 2017-01-19 PROCEDURE — 25000125 ZZHC RX 250: Performed by: PHYSICIAN ASSISTANT

## 2017-01-19 PROCEDURE — 25000132 ZZH RX MED GY IP 250 OP 250 PS 637: Performed by: UROLOGY

## 2017-01-19 PROCEDURE — 85027 COMPLETE CBC AUTOMATED: CPT | Performed by: STUDENT IN AN ORGANIZED HEALTH CARE EDUCATION/TRAINING PROGRAM

## 2017-01-19 PROCEDURE — 36415 COLL VENOUS BLD VENIPUNCTURE: CPT | Performed by: STUDENT IN AN ORGANIZED HEALTH CARE EDUCATION/TRAINING PROGRAM

## 2017-01-19 PROCEDURE — 25000132 ZZH RX MED GY IP 250 OP 250 PS 637: Performed by: PHYSICIAN ASSISTANT

## 2017-01-19 RX ORDER — BISACODYL 10 MG
10 SUPPOSITORY, RECTAL RECTAL DAILY PRN
Status: DISCONTINUED | OUTPATIENT
Start: 2017-01-19 | End: 2017-01-19 | Stop reason: HOSPADM

## 2017-01-19 RX ORDER — ONDANSETRON 4 MG/1
4 TABLET, ORALLY DISINTEGRATING ORAL EVERY 6 HOURS PRN
Qty: 10 TABLET | Refills: 0 | Status: SHIPPED | OUTPATIENT
Start: 2017-01-19 | End: 2017-09-07

## 2017-01-19 RX ORDER — IBUPROFEN 600 MG/1
600 TABLET, FILM COATED ORAL EVERY 6 HOURS
Status: DISCONTINUED | OUTPATIENT
Start: 2017-01-19 | End: 2017-01-19 | Stop reason: HOSPADM

## 2017-01-19 RX ADMIN — RANITIDINE HYDROCHLORIDE 150 MG: 150 TABLET, FILM COATED ORAL at 08:01

## 2017-01-19 RX ADMIN — KETOROLAC TROMETHAMINE 30 MG: 30 INJECTION, SOLUTION INTRAMUSCULAR at 06:39

## 2017-01-19 RX ADMIN — DOCUSATE SODIUM 286 ML: 50 LIQUID ORAL at 08:02

## 2017-01-19 RX ADMIN — ACETAMINOPHEN 975 MG: 325 TABLET, FILM COATED ORAL at 14:23

## 2017-01-19 RX ADMIN — SENNOSIDES AND DOCUSATE SODIUM 1 TABLET: 8.6; 5 TABLET ORAL at 08:01

## 2017-01-19 RX ADMIN — IBUPROFEN 600 MG: 600 TABLET ORAL at 12:41

## 2017-01-19 RX ADMIN — ACETAMINOPHEN 975 MG: 325 TABLET, FILM COATED ORAL at 06:28

## 2017-01-19 RX ADMIN — KETOROLAC TROMETHAMINE 30 MG: 30 INJECTION, SOLUTION INTRAMUSCULAR at 02:26

## 2017-01-19 NOTE — PLAN OF CARE
Problem: Goal Outcome Summary  Goal: Goal Outcome Summary  Outcome: No Change  Afebrile,VSS. Sats 95-98% RA.  Abdomen, rounded, soft, tender. Incision cdi. Pain controlled with tylenol and Advil.Passing gas pink lady enema given no stool. Denies nausea tolerating diet. Voiding adequately. Ambulating on unit. Family at bedside. Plan to discharge later this evening. Cont with poc.

## 2017-01-19 NOTE — PROGRESS NOTES
Urology Daily Progress Note  1/192017    24 hour events/Subjective:    No acute events overnight.   Ambulating  Had a BM this AM  Pain well tolerated   vomited in the afternoon but none overnight, feeling bloated    O:  Temp:  [96.6  F (35.9  C)-99.6  F (37.6  C)] 99.6  F (37.6  C)  Heart Rate:  [] 104  Resp:  [16] 16  BP: ()/(54-69) 111/54 mmHg  SpO2:  [99 %-100 %] 99 %  General: Alert, interactive, NAD  Resp: Breathing is non-labored on room air  Abdomen: Soft, nontender, nondistended. Incisions C/D/I with dermabond.  : Rutledge out  Extremities: No LE edema  Skin: Warm and dry, no jaundice or rash     Ins & Outs (24 hours/since [place])  I/O this shift:  In: -   Out: 200 [Urine:200]  I/O last 3 completed shifts:  In: 2254.25 [P.O.:900; I.V.:354.25; IV Piggyback:1000]  Out: 2725 [Urine:2725]    Labs/Imaging  BMP    Recent Labs  Lab 01/18/17  0741 01/17/17  0810 01/16/17  1745    137 140   POTASSIUM 3.7 3.9 3.7   CHLORIDE 107 108 107   MAXIMINO 7.5* 7.6* 7.9*   CO2 23 22 26   BUN 5* 6* 4*   CR 0.57 0.55 0.54   * 141* 113*     CBC    Recent Labs  Lab 01/18/17  0741 01/17/17  0810 01/16/17  1745 01/16/17  1213   WBC 5.2 10.7 11.5*  --    RBC 3.54* 3.91 3.93  --    HGB 10.3* 11.4* 11.3* 12.4   HCT 32.1* 35.0 34.7*  --    MCV 91 90 88  --    MCH 29.1 29.2 28.8  --    MCHC 32.1 32.6 32.6  --    RDW 13.7 13.4 13.2  --     172 152  --      INR    Recent Labs  Lab 01/13/17  1015   INR 0.99          Assessment/Plan  36 year old yo female POD #3 s/p robotic left partial nephrectomy.    PLAN:  Neuro/Pain: Continue current regimen  CV/PULM: No acute issues. Encourage IS, ambulation  GI/FEN: Regular diet. Anti-nausea medicine  : rutledge out. uop adequate   Heme: No active issues. AM labs pending  ID: Periop abx  Endocrine: No issues  Activity: Up ad deric  Ppx: SCDs, ambulation, IS  Dispo: 7B; home today pending improvement in nausea     Seen and examined with the chief resident. Will discuss with   Weight.    --    Murtaza Dickinson MD  Urology PGY2             Contacting the Urology Team     Please use the following job codes to reach the Urology Team. Note that you must use an in house phone and that job codes cannot receive text pages.     On weekdays, dial [phone] (or star-star-star 777 on the new [employer] telephones) then 0817 to reach the Adult Urology resident or PA on call    On weekdays, dial [phone] (or star-star-star 777 on the new [employer] telephones) then 0818 to reach the Pediatric Urology resident    On weeknights and weekends, dial [phone] (or star-star-star 777 on the new [employer] telephones) then 0039 to reach the Urology resident on call (for both Adult and Pediatrics)

## 2017-01-19 NOTE — PROGRESS NOTES
Focus:  Status  Data:     Pt is feeling much better this evening.  Pt is up ambulating long distances in hallways, is steady when up.  Pt voiding in good amts et pt did have bm after supp was given.  Pt has been receiving tylenol et toradol for pain et pt does have her scopalamine patch on et pt has not complained of n/v .  Will continue to monitor notify md with problems

## 2017-01-19 NOTE — PROGRESS NOTES
Patient still in-patient. No Clinic Care coordination outreach at this time.    Abebe Maria RN  Clinic Care Coordinator  [phone] or [phone]

## 2017-01-19 NOTE — PLAN OF CARE
"Problem: Goal Outcome Summary  Goal: Goal Outcome Summary  Outcome: No Change  /54 mmHg  Temp(Src) 99.6  F (37.6  C) (Oral)  Resp 16  Ht 1.6 m (5' 2.99\")  Wt 66.2 kg (145 lb 15.1 oz)  BMI 25.86 kg/m2  SpO2 99%  5408-5991: Patient afebrile, VSS, O 2 Pt denies nausea or SOB.  Pt tolerating regular diet. Patient states pain it tolerable, on scheduled toradol and tylenol with relief, abd lap sites dry, intact and dermabonded, + bowel sounds, lungs clear. patients  iv infusing, Pt ambulating to restroom with 's assist, voiding adequately.  Patient appears to rest between cares. continue with POC.             "

## 2017-01-20 ENCOUNTER — CARE COORDINATION (OUTPATIENT)
Dept: CARDIOLOGY | Facility: CLINIC | Age: 37
End: 2017-01-20

## 2017-01-20 LAB — COPATH REPORT: NORMAL

## 2017-01-20 NOTE — PROGRESS NOTES
"[facility]  \"Hello, my name is Hermila Varner , and I am calling from the [facility].  I want to check in and see how you are doing, after leaving the hospital.  You may also receive a call from your Care Coordinator (care team), but I want to make sure you don t have any urgent needs.  I have a couple questions to review with you:     Post-Discharge Outreach                                                    Brynn Sprague is a 36 year old female     Date of Admission:                 1/16/2017    Date of Discharge::                 1/19/2017  Admitting Physician:               Jairo Cross MD  Discharge Physician:              Irma Zaldivar PA-C              Admission Diagnoses:    Left Renal Mass       Past Medical History     Past Medical History    Diagnosis  Date       Normal pregnancy  2006,08,09,12       Left renal mass         Chronic neutropenia (H)          possibly benign essential neutropenia       Renal cell carcinoma (H)         GERD (gastroesophageal reflux disease)         Pulmonary nodules                    Discharge Diagnosis:    Left Renal Mass               Follow-up Appointments      Adult [facility] Follow-up and recommended labs and tests        Follow-Up:    - Call your primary care provider to touch base regarding your recent admission.     - Follow up with Dr. Cross as scheduled in Urology Clinic  - Call or return sooner than your regularly scheduled visit if you develop any of the following:  Fever (greater than 101.3F) or flu-like symptoms, uncontrolled pain, uncontrolled nausea or vomiting, as well as increased redness, swelling, or drainage from your wound.     Phone numbers:  - Nursing phone helpline at the Urology Clinic (8A-5P M-F):  [phone].     - Nights or weekends, call [phone] and ask the  to page the urology resident on call.    - For emergencies, always call [phone]     Appointments on University and/or " [facility] (with [facility] or [facility] provider or service). Call [phone] if you haven't heard regarding these appointments within 7 days of discharge.                       Your next 10 appointments already scheduled      Feb 23, 2017  1:45 PM   (Arrive by 1:30 PM)   Post-Op with Jairo Cross MD   [facility]rology and [facility] Urologic C[facility] ([facility])               Care Team:    Patient Care Team       Relationship Specialty Notifications Start End    Dallas Parra PA-C PCP - General Physician Assistant  8/22/16     Phone: [phone] Fax: [phone]         [facility] [place] [address] [place] [address]    Ileana Esteves NP Nurse Practitioner   11/28/16     Comment:  referring to IR    Phone: [phone] Fax: [phone]         [facility] - [place] [address] [place] [address]    Clementina Philip, ALEXI Nurse Coordinator  Abnormal results only, Admissions 12/16/16     Comment:  phone:  [phone]    Jairo Cross MD MD Urology  1/12/17     Phone: [phone] Fax: [phone]         [address] [address]    Loan Maharaj, RN Registered Nurse Urology  1/12/17     Phone: [phone] Pager: [phone]        Dallas Parra PA-C Referring Physician Physician Assistant  1/12/17     Phone: [phone] Fax: [phone]         [facility] [place] [address] [place] [address]            Transition of Care Review                                                      Did you have a surgery or procedure during your hospital visit? Yes   If yes, do you have any of the following:     Signs of infection:  No    Pain:  Yes     Pain Scale (0-10) 4/10     Location: Surg site    Wound/incision concerns? No    Do you have all of your medications/refills?  Yes    Are you having any side effects or questions about your medication(s)? No    Do you have any new or worsening symptoms?  Yes-  dizziness, fall prevention was discussed    Do you have any future appointments scheduled?   Yes   2/23/17             Plan                                                      Thanks for your time.  Your Care Coordinator will follow-up within the next couple days.  In the meantime if you have questions, concerns or problems call your care team.        Hermila Varner

## 2017-01-20 NOTE — PLAN OF CARE
Problem: Goal Outcome Summary  Goal: Goal Outcome Summary  Outcome: Adequate for Discharge Date Met:  01/19/17  Pt discharged to home. Discharge orders reviewed with patient and . Prior to discharge afebrile,VSS.Sats 95-98%RA. Pain controlled with tylenol and Advil. Passing gas, tolerating diet. Voiding adequately. Ambulating indep. Pt transported by .

## 2017-01-25 ENCOUNTER — TELEPHONE (OUTPATIENT)
Dept: FAMILY MEDICINE | Facility: CLINIC | Age: 37
End: 2017-01-25

## 2017-02-13 ENCOUNTER — PRE VISIT (OUTPATIENT)
Dept: UROLOGY | Facility: CLINIC | Age: 37
End: 2017-02-13

## 2017-02-23 ENCOUNTER — OFFICE VISIT (OUTPATIENT)
Dept: UROLOGY | Facility: CLINIC | Age: 37
End: 2017-02-23

## 2017-02-23 VITALS
WEIGHT: 148 LBS | DIASTOLIC BLOOD PRESSURE: 69 MMHG | SYSTOLIC BLOOD PRESSURE: 112 MMHG | BODY MASS INDEX: 26.22 KG/M2 | HEIGHT: 63 IN | HEART RATE: 69 BPM

## 2017-02-23 DIAGNOSIS — C64.2 RENAL CELL CANCER, LEFT (H): Primary | ICD-10-CM

## 2017-02-23 RX ORDER — CYCLOBENZAPRINE HCL 10 MG
10 TABLET ORAL
COMMUNITY
Start: 2016-11-11 | End: 2017-09-07

## 2017-02-23 RX ORDER — NAPROXEN 500 MG/1
500 TABLET ORAL
COMMUNITY
Start: 2016-01-31 | End: 2017-09-07

## 2017-02-23 RX ORDER — CRUTCH
EACH MISCELLANEOUS
COMMUNITY
Start: 2010-11-16 | End: 2017-09-07

## 2017-02-23 ASSESSMENT — PAIN SCALES - GENERAL: PAINLEVEL: MODERATE PAIN (4)

## 2017-02-23 NOTE — MR AVS SNAPSHOT
After Visit Summary   2/23/2017    Brynn Sprague    MRN: [id number]           Patient Information     Date Of Birth          1980        Visit Information        Provider Department      2/23/2017 1:45 PM America, Jairo Castro MD [facility] and [facility] for Prostate and Urologic Cancers        Today's Diagnoses     Renal cell cancer, left (H)    -  1      Care Instructions    Follow up with Dr. Cross in 3 months with imaging and blood work prior to appointment.    It was a pleasure meeting with you today.  Thank you for allowing me and my team the privilege of caring for you today.  YOU are the reason we are here, and I truly hope we provided you with the excellent service you deserve.  Please let us know if there is anything else we can do for you so that we can be sure you are leaving completely satisfied with your care experience.      Dorcas Reyes RITA        Follow-ups after your visit        Your next 10 appointments already scheduled     May 22, 2017  9:40 AM CDT   (Arrive by 9:25 AM)   CT CHEST W/O & W CONTRAST with UCCT1   [facility] ([facility])    [address] [place] [address] [phone]           Please bring any scans or X-rays taken at other hospitals, if similar tests were done. Also bring a list of your medicines, including vitamins, minerals and over-the-counter drugs. It is safest to leave personal items at home.  Be sure to tell your doctor:   If you have any allergies.   If there s any chance you are pregnant.   If you are breastfeeding.   If you have any special needs.  You will have contrast for this exam. To prepare:   Do not eat or drink for 2 hours before your exam. If you need to take medicine, you may take it with small sips of water. (We may ask you to take liquid medicine as well.)   The day before your exam, drink extra fluids at least six 8-ounce glasses (unless your doctor tells  you to restrict your fluids).  Patients over 70 or patients with diabetes or kidney problems:   If you haven t had a blood test (creatinine test) within the last 30 days, go to your clinic or Diagnostic Imaging Department for this test.  If you have diabetes:   If your kidney function is normal, continue taking your metformin (Avandamet, Glucophage, Glucovance, Metaglip) on the day of your exam.   If your kidney function is abnormal, wait 48 hours before restarting this medicine.  Please wear loose clothing, such as a sweat suit or jogging clothes. Avoid snaps, zippers and other metal. We may ask you to undress and put on a hospital gown.  If you have any questions, please call the Imaging Department where you will have your exam.            May 22, 2017 10:00 AM CDT   (Arrive by 9:45 AM)   CT ABDOMEN W/O & W CONTRAST with UCCT1   [facility] CT ([facility])    [address] [address] [phone]           Please bring any scans or X-rays taken at other hospitals, if similar tests were done. Also bring a list of your medicines, including vitamins, minerals and over-the-counter drugs. It is safest to leave personal items at home.  Be sure to tell your doctor:   If you have any allergies.   If there s any chance you are pregnant.   If you are breastfeeding.   If you have any special needs.  You may have contrast for this exam. To prepare:   Do not eat or drink for 2 hours before your exam. If you need to take medicine, you may take it with small sips of water. (We may ask you to take liquid medicine as well.)   The day before your exam, drink extra fluids at least six 8-ounce glasses (unless your doctor tells you to restrict your fluids).  Patients over 70 or patients with diabetes or kidney problems:   If you haven t had a blood test (creatinine test) within the last 30 days, go to your clinic or Diagnostic Imaging Department for this test.   If you have diabetes:   If your kidney function is normal, continue taking your metformin (Avandamet, Glucophage, Glucovance, Metaglip) on the day of your exam.   If your kidney function is abnormal, wait 48 hours before restarting this medicine.  You will have oral contrast for this exam:   You will drink the contrast at home. Get this from your clinic or Diagnostic Imaging Department. Please follow the directions given.  Please wear loose clothing, such as a sweat suit or jogging clothes. Avoid snaps, zippers and other metal. We may ask you to undress and put on a hospital gown.  If you have any questions, please call the Imaging Department where you will have your exam.            May 22, 2017 10:30 AM CDT   (Arrive by 10:15 AM)   XR CHEST 2 VIEWS with UCXR1   [facility] ([facility])    [address] [place] [address] [phone]           Please bring a list of your current medicines to your exam. (Include vitamins, minerals and over-thecounter medicines.) Leave your valuables at home.  Tell your doctor if there is a chance you may be pregnant.  You do not need to do anything special for this exam.            May 22, 2017 10:45 AM CDT   LAB with [facility]   [facility] ([facility])    [address] [place] [address] [phone]           Patient must bring picture ID.  Patient should be prepared to give a urine specimen  Please do not eat 10-12 hours before your appointment if you are coming in fasting for labs on lipids, cholesterol, or glucose (sugar).  Pregnant women should follow their Care Team instructions. Water with medications is okay. Do not drink coffee or other fluids.   If you have concerns about taking  your medications, please ask at office or if scheduling via [employer], send a message by clicking on Secure Messaging, Message Your Care Team.            May 22, 2017 11:00 AM  CDT   (Arrive by 10:45 AM)   Return Visit with Jairo Cross MD   [facility] and [facility] for Prostate and Urologic Cancers ([facility])    [address] [address] [phone]              Future tests that were ordered for you today     Open Future Orders        Priority Expected Expires Ordered    CT Abdomen w/o & w Contrast Routine  2018    Basic metabolic panel Routine  2023    CBC with platelets differential Routine  2023    XR Chest 2 Views Routine  2023    CT Chest w/o & w Contrast Routine  2023            Who to contact     Please call your clinic at [phone] to:    Ask questions about your health    Make or cancel appointments    Discuss your medicines    Learn about your test results    Speak to your doctor   If you have compliments or concerns about an experience at your clinic, or if you wish to file a complaint, please contact [facility] Patient Relations at [phone] or email us at [email or web address]         Additional Information About Your Visit        HylioSoftharDxTerity Information     Daji[employer]ot is an electronic gateway that provides easy, online access to your medical records. With myWebRoom, you can request a clinic appointment, read your test results, renew a prescription or communicate with your care team.     To sign up for Dajiabaot visit the website at [email or web address]   You will be asked to enter the access code listed below, as well as some personal information. Please follow the directions to create your username and password.     Your access code is: [email or web address]  Expires: 5/10/2017  6:30 AM     Your access code will  in 90 days. If you need help or a new code, please contact your [facility] or call [phone] for assistance.        Care EveryWhere ID     This  "is your Care EveryWhere ID. This could be used by other organizations to access your [facility] medical records  [id number]        Your Vitals Were     Pulse Height BMI (Body Mass Index)             69 1.6 m (5' 3\") 26.22 kg/m2          Blood Pressure from Last 3 Encounters:   02/23/17 112/69   01/19/17 111/68   01/13/17 117/79    Weight from Last 3 Encounters:   02/23/17 67.1 kg (148 lb)   01/16/17 66.2 kg (145 lb 15.1 oz)   01/13/17 68.1 kg (150 lb 1.6 oz)               Primary Care Provider Office Phone # Fax #    Dallas Parra PA-C [phone]        JUANI GOLDMAN [address] [place] [address]        Thank you!     Thank you for choosing [facility] AND [facility] FOR PROSTATE AND UROLOGIC CANCERS  for your care. Our goal is always to provide you with excellent care. Hearing back from our patients is one way we can continue to improve our services. Please take a few minutes to complete the written survey that you may receive in the mail after your visit with us. Thank you!             Your Updated Medication List - Protect others around you: Learn how to safely use, store and throw away your medicines at www.disposemymeds.org.          This list is accurate as of: 2/23/17  3:25 PM.  Always use your most recent med list.                   Brand Name Dispense Instructions for use    * acetaminophen 325 MG tablet    TYLENOL     Take 325 mg by mouth as needed       * acetaminophen 325 MG tablet    TYLENOL    100 tablet    Take 2 tablets (650 mg) by mouth every 6 hours as needed for other (surgical pain)       Crutch Misc      As directed. For home use.       cyclobenzaprine 10 MG tablet    FLEXERIL     Take 10 mg by mouth       ibuprofen 600 MG tablet    ADVIL/MOTRIN    30 tablet    Take 1 tablet (600 mg) by mouth every 6 hours as needed       levonorgestrel 20 MCG/24HR IUD    MIRENA     1 each by Intrauterine route once       naproxen 500 MG Tbec      Take 500 mg by mouth       " ondansetron 4 MG ODT tab    ZOFRAN-ODT    10 tablet    Take 1 tablet (4 mg) by mouth every 6 hours as needed for nausea       oxyCODONE 5 MG IR tablet    ROXICODONE    30 tablet    Take 1-2 tablets (5-10 mg) by mouth every 3 hours as needed for moderate to severe pain       ranitidine 150 MG tablet    ZANTAC    42 tablet    Take 1 tablet (150 mg) by mouth 2 times daily For 14 days then once daily for 14 days then stop       senna-docusate 8.6-50 MG per tablet    SENOKOT-S;PERICOLACE    45 tablet    Take 1 tablet by mouth 2 times daily       * Notice:  This list has 2 medication(s) that are the same as other medications prescribed for you. Read the directions carefully, and ask your doctor or other care provider to review them with you.

## 2017-02-23 NOTE — LETTER
[facility]OLOG[facility] AND INST FOR PROSTATE AND UROLOGIC CANCERS  [address]  [address]  [facility] [address]          February 23, 2017    RE:  Brynn Sprague                                                                                                                                                       [address]  [place] [address]            To whom it may concern:    Brynn Sprague is under my professional care for Renal cell cancer, left (H) She  may return to work 3/1/2017 with the following:     When the patient returns to work, the following restrictions apply until 3/15/2017:  A) Bend: Occasionally (1-3 hours)  B) Squat: Not at all (0 hours)  C) Walk/Stand: Occasionally (1-3 hours)  D) Reach Above Shoulders: Not at all (0 hours)  E) Lift, carry, push, and pull no more than:  0-10 lbs.Light duty-unable to lift more than 10 pounds.      Sincerely,        Jairo Cross MD

## 2017-02-23 NOTE — PATIENT INSTRUCTIONS
Follow up with Dr. Cross in 3 months with imaging and blood work prior to appointment.    It was a pleasure meeting with you today.  Thank you for allowing me and my team the privilege of caring for you today.  YOU are the reason we are here, and I truly hope we provided you with the excellent service you deserve.  Please let us know if there is anything else we can do for you so that we can be sure you are leaving completely satisfied with your care experience.      GABBY Levy

## 2017-02-23 NOTE — NURSING NOTE
Chief Complaint   Patient presents with     RECHECK     post op partial nephectomy        Nicki Rivers MA

## 2017-02-23 NOTE — PROGRESS NOTES
"Kidney Cancer Follow up     Brynn Sprague is a very pleasant 36 year old female who presents with a history of a Renal Cell Cancer.    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    ISUP Grade: na  Pathologic Stage T2a see below*.    Maximal tumor dimension was 8.7 cm.    Tumor thrombus level  not applicable.    Tumor necrosis present: No  Sarcomatoid features present: No  Patient is status post Robotic Partial Nephrectomy on 1/16/2017.   Tumor was on the left side.       There is no evidence of disease recurrence to date.    Physical Exam  /69  Pulse 69  Ht 1.6 m (5' 3\")  Wt 67.1 kg (148 lb)  BMI 26.22 kg/m2    Abd is soft, non-distended, incisions are healing well    Recent Kidney Function  Lab Results   Component Value Date    CR 0.61 01/19/2017    CR 0.57 01/18/2017    CR 0.55 01/17/2017    CR 0.54 01/16/2017    CR 0.58 12/16/2016    CR 0.57 03/19/2012       Chest imaging demonstrates no evidence of recurrence  Abdominal imaging demonstrates no evidence of recurrence    Assessment/Plan:     Hx of Renal Cell Cancer [id number], chromophobe, s/p Robotic Partial Nephrectomy   Follow up in 3 months     CT of Abd with and without IV contrast, no oral contrast needed    Chest CT scan    BMP, CBC      Jairo Cross MD  Department of Urology  [facility]      *AJCC/pTNM staging 2010 where   T1a (<=4cm), T1b  (4-7cm)  T2a (7-10 cm), T2b (>10 cm)   T3a (renal vein thrombus, perirenal or renal sinus fat invasion)   T3b (tumor thrombus to IVC, below diaphragm)  T3c (tumor thrombus to IVC, above diaphragm)   T4 (tumor invades beyond Gerota's fascia)  Nx regional nodes not assessed  N0 No lymph node metastasis  N1 Lymph node metastasis  M0 No distant Mets  M1 Distant Mestastasis    "

## 2017-02-23 NOTE — LETTER
"2/23/2017       RE: Brynn Sprague  [address]  [place] [facility] [place] [address]     Dear Colleague,    Thank you for referring your patient, Brynn Sprague, to the [facility] AND [facility] FOR PROSTATE AND UROLOGIC CANCERS at [facility]. Please see a copy of my visit note below.    Kidney Cancer Follow up     Brynn Sprague is a very pleasant 36 year old female who presents with a history of a Renal Cell Cancer.    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    ISUP Grade: na  Pathologic Stage T2a see below*.    Maximal tumor dimension was 8.7 cm.    Tumor thrombus level  not applicable.    Tumor necrosis present: No  Sarcomatoid features present: No  Patient is status post Robotic Partial Nephrectomy on 1/16/2017.   Tumor was on the left side.       There is no evidence of disease recurrence to date.    Physical Exam  /69  Pulse 69  Ht 1.6 m (5' 3\")  Wt 67.1 kg (148 lb)  BMI 26.22 kg/m2    Abd is soft, non-distended, incisions are healing well    Recent Kidney Function  Lab Results   Component Value Date    CR 0.61 01/19/2017    CR 0.57 01/18/2017    CR 0.55 01/17/2017    CR 0.54 01/16/2017    CR 0.58 12/16/2016    CR 0.57 03/19/2012       Chest imaging demonstrates no evidence of recurrence  Abdominal imaging demonstrates no evidence of recurrence    Assessment/Plan:     Hx of Renal Cell Cancer [id number], chromophobe, s/p Robotic Partial Nephrectomy   Follow up in 3 months     CT of Abd with and without IV contrast, no oral contrast needed    Chest CT scan    BMP, CBC      Jairo Cross MD  Department of Urology  [facility]      *AJCC/pTNM staging 2010 where   T1a (<=4cm), T1b  (4-7cm)  T2a (7-10 cm), T2b (>10 cm)   T3a (renal vein thrombus, perirenal or renal sinus fat invasion)   T3b (tumor thrombus to IVC, below diaphragm)  T3c (tumor thrombus to IVC, above diaphragm)   T4 (tumor invades beyond Gerota's fascia)  Nx regional nodes not assessed  N0 " No lymph node metastasis  N1 Lymph node metastasis  M0 No distant Mets  M1 Distant Mestastasis      Again, thank you for allowing me to participate in the care of your patient.      Sincerely,    Jairo Cross MD

## 2017-03-29 ENCOUNTER — TRANSFERRED RECORDS (OUTPATIENT)
Dept: HEALTH INFORMATION MANAGEMENT | Facility: CLINIC | Age: 37
End: 2017-03-29

## 2017-05-26 ENCOUNTER — PRE VISIT (OUTPATIENT)
Dept: UROLOGY | Facility: CLINIC | Age: 37
End: 2017-05-26

## 2017-06-01 ENCOUNTER — OFFICE VISIT (OUTPATIENT)
Dept: UROLOGY | Facility: CLINIC | Age: 37
End: 2017-06-01

## 2017-06-01 VITALS
HEIGHT: 62 IN | BODY MASS INDEX: 26.5 KG/M2 | WEIGHT: 144 LBS | HEART RATE: 92 BPM | SYSTOLIC BLOOD PRESSURE: 104 MMHG | DIASTOLIC BLOOD PRESSURE: 66 MMHG

## 2017-06-01 DIAGNOSIS — C64.2 RENAL CELL CANCER, LEFT (H): ICD-10-CM

## 2017-06-01 DIAGNOSIS — C64.2 RENAL CELL CANCER, LEFT (H): Primary | ICD-10-CM

## 2017-06-01 LAB
ANION GAP SERPL CALCULATED.3IONS-SCNC: 6 MMOL/L (ref 3–14)
BASOPHILS # BLD AUTO: 0 10E9/L (ref 0–0.2)
BASOPHILS NFR BLD AUTO: 0.3 %
BUN SERPL-MCNC: 8 MG/DL (ref 7–30)
CALCIUM SERPL-MCNC: 8.5 MG/DL (ref 8.5–10.1)
CHLORIDE SERPL-SCNC: 106 MMOL/L (ref 94–109)
CO2 SERPL-SCNC: 29 MMOL/L (ref 20–32)
CREAT SERPL-MCNC: 0.51 MG/DL (ref 0.52–1.04)
DIFFERENTIAL METHOD BLD: ABNORMAL
EOSINOPHIL # BLD AUTO: 0.1 10E9/L (ref 0–0.7)
EOSINOPHIL NFR BLD AUTO: 1.6 %
ERYTHROCYTE [DISTWIDTH] IN BLOOD BY AUTOMATED COUNT: 12.9 % (ref 10–15)
GFR SERPL CREATININE-BSD FRML MDRD: ABNORMAL ML/MIN/1.7M2
GLUCOSE SERPL-MCNC: 75 MG/DL (ref 70–99)
HCT VFR BLD AUTO: 39.9 % (ref 35–47)
HGB BLD-MCNC: 13.4 G/DL (ref 11.7–15.7)
IMM GRANULOCYTES # BLD: 0 10E9/L (ref 0–0.4)
IMM GRANULOCYTES NFR BLD: 0 %
LYMPHOCYTES # BLD AUTO: 1 10E9/L (ref 0.8–5.3)
LYMPHOCYTES NFR BLD AUTO: 31.3 %
MCH RBC QN AUTO: 30.2 PG (ref 26.5–33)
MCHC RBC AUTO-ENTMCNC: 33.6 G/DL (ref 31.5–36.5)
MCV RBC AUTO: 90 FL (ref 78–100)
MONOCYTES # BLD AUTO: 0.2 10E9/L (ref 0–1.3)
MONOCYTES NFR BLD AUTO: 7.2 %
NEUTROPHILS # BLD AUTO: 1.8 10E9/L (ref 1.6–8.3)
NEUTROPHILS NFR BLD AUTO: 59.6 %
NRBC # BLD AUTO: 0 10*3/UL
NRBC BLD AUTO-RTO: 0 /100
PLATELET # BLD AUTO: 187 10E9/L (ref 150–450)
POTASSIUM SERPL-SCNC: 3.8 MMOL/L (ref 3.4–5.3)
RBC # BLD AUTO: 4.43 10E12/L (ref 3.8–5.2)
SODIUM SERPL-SCNC: 140 MMOL/L (ref 133–144)
WBC # BLD AUTO: 3 10E9/L (ref 4–11)

## 2017-06-01 ASSESSMENT — PAIN SCALES - GENERAL: PAINLEVEL: NO PAIN (0)

## 2017-06-01 NOTE — PATIENT INSTRUCTIONS
Follow up with Dr. Cross in 3-4 months with labs and imaging prior to appointment.    It was a pleasure meeting with you today.  Thank you for allowing me and my team the privilege of caring for you today.  YOU are the reason we are here, and I truly hope we provided you with the excellent service you deserve.  Please let us know if there is anything else we can do for you so that we can be sure you are leaving completely satisfied with your care experience.      GABBY Levy

## 2017-06-01 NOTE — PROGRESS NOTES
"Kidney Cancer Follow up     Brynn Sprague is a very pleasant 36 year old female who presents with a history of a Renal Cell Cancer.    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    ISUP Grade: na  Pathologic Stage T2a see below*.    Maximal tumor dimension was 8.7 cm.    Tumor thrombus level  not applicable.    Tumor necrosis present: No  Sarcomatoid features present: No  Patient is status post Robotic Partial Nephrectomy on 1/16/2017.   Tumor was on the left side.       There is no evidence of disease recurrence to date.    Physical Exam  /66  Pulse 92  Ht 1.575 m (5' 2\")  Wt 65.3 kg (144 lb)  BMI 26.34 kg/m2    Abd is soft, non-distended, incisions are healing well, no hernia  An area on the abd that itches even before surgery  An area on the left thigh that is numb     Recent Kidney Function  Lab Results   Component Value Date    CR 0.61 01/19/2017    CR 0.57 01/18/2017    CR 0.55 01/17/2017    CR 0.54 01/16/2017    CR 0.58 12/16/2016    CR 0.57 03/19/2012     Hemoglobin   Date Value Ref Range Status   06/01/2017 13.4 11.7 - 15.7 g/dL Final         Chest imaging demonstrates no evidence of recurrence  Abdominal imaging demonstrates no evidence of recurrence    Assessment/Plan:     Hx of Renal Cell Cancer [id number], chromophobe, s/p Robotic Partial Nephrectomy   Follow up in 3-4 months     CT of Abd with and without IV contrast, no oral contrast needed    Chest CT scan    BMP, CBC      Discussed genetic testing, but patient would like to wait on this.  No family history of rcc       If leg is not better in 3-4 months can consider a referral to neurology.  In the mean time, okay to use heat and ice and massage to help with the numbness, pain.    Jairo Cross MD  Department of Urology  [facility]      *AJCC/pTNM staging 2010 where   T1a (<=4cm), T1b  (4-7cm)  T2a (7-10 cm), T2b (>10 cm)   T3a (renal vein thrombus, perirenal or renal sinus fat invasion)   T3b (tumor thrombus to IVC, " below diaphragm)  T3c (tumor thrombus to IVC, above diaphragm)   T4 (tumor invades beyond Gerota's fascia)  Nx regional nodes not assessed  N0 No lymph node metastasis  N1 Lymph node metastasis  M0 No distant Mets  M1 Distant Mestastasis

## 2017-06-01 NOTE — LETTER
"6/1/2017       RE: Brynn Sprague  [address]  [address]  [place] [facility] [address]     Dear Colleague,    Thank you for referring your patient, Brynn Sprague, to the [facility] UROLOGY AND [facility] FOR PROSTATE AND UROLOGIC CANCERS at [facility]. Please see a copy of my visit note below.    Kidney Cancer Follow up     Brynn Sprague is a very pleasant 36 year old female who presents with a history of a Renal Cell Cancer.    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    ISUP Grade: na  Pathologic Stage T2a see below*.    Maximal tumor dimension was 8.7 cm.    Tumor thrombus level  not applicable.    Tumor necrosis present: No  Sarcomatoid features present: No  Patient is status post Robotic Partial Nephrectomy on 1/16/2017.   Tumor was on the left side.       There is no evidence of disease recurrence to date.    Physical Exam  /66  Pulse 92  Ht 1.575 m (5' 2\")  Wt 65.3 kg (144 lb)  BMI 26.34 kg/m2    Abd is soft, non-distended, incisions are healing well, no hernia  An area on the abd that itches even before surgery  An area on the left thigh that is numb     Recent Kidney Function  Lab Results   Component Value Date    CR 0.61 01/19/2017    CR 0.57 01/18/2017    CR 0.55 01/17/2017    CR 0.54 01/16/2017    CR 0.58 12/16/2016    CR 0.57 03/19/2012     Hemoglobin   Date Value Ref Range Status   06/01/2017 13.4 11.7 - 15.7 g/dL Final     Chest imaging demonstrates no evidence of recurrence  Abdominal imaging demonstrates no evidence of recurrence    Assessment/Plan:     Hx of Renal Cell Cancer [id number], chromophobe, s/p Robotic Partial Nephrectomy   Follow up in 3-4 months     CT of Abd with and without IV contrast, no oral contrast needed    Chest CT scan    BMP, CBC      Discussed genetic testing, but patient would like to wait on this.  No family history of rcc       If leg is not better in 3-4 months can consider a referral to neurology.  In the mean time, okay to use heat and " ice and massage to help with the numbness, pain.    Jairo Cross MD  Department of Urology  [facility]    *AJCC/pTNM staging 2010 where   T1a (<=4cm), T1b  (4-7cm)  T2a (7-10 cm), T2b (>10 cm)   T3a (renal vein thrombus, perirenal or renal sinus fat invasion)   T3b (tumor thrombus to IVC, below diaphragm)  T3c (tumor thrombus to IVC, above diaphragm)   T4 (tumor invades beyond Gerota's fascia)  Nx regional nodes not assessed  N0 No lymph node metastasis  N1 Lymph node metastasis  M0 No distant Mets  M1 Distant Mestastasis

## 2017-06-01 NOTE — MR AVS SNAPSHOT
After Visit Summary   6/1/2017    Brynn Sprague    MRN: [id number]           Patient Information     Date Of Birth          1980        Visit Information        Provider Department      6/1/2017 9:40 AM Jairo Cross MD [facility] Urology and [facility] and Urologic C[facility]        Today's Diagnoses     Renal cell cancer, left (H)    -  1      Care Instructions    Follow up with Dr. Cross in 3-4 months with labs and imaging prior to appointment.    It was a pleasure meeting with you today.  Thank you for allowing me and my team the privilege of caring for you today.  YOU are the reason we are here, and I truly hope we provided you with the excellent service you deserve.  Please let us know if there is anything else we can do for you so that we can be sure you are leaving completely satisfied with your care experience.      Dorcas Reyes RITA          Follow-ups after your visit        Your next 10 appointments already scheduled     Sep 07, 2017  8:45 AM CDT   LAB with  LAB   [facility] Lab ([facility])    [address] [place] [address] [phone]           Patient must bring picture ID.  Patient should be prepared to give a urine specimen  Please do not eat 10-12 hours before your appointment if you are coming in fasting for labs on lipids, cholesterol, or glucose (sugar).  Pregnant women should follow their Care Team instructions. Water with medications is okay. Do not drink coffee or other fluids.   If you have concerns about taking  your medications, please ask at office or if scheduling via [employer], send a message by clicking on Secure Messaging, Message Your Care Team.            Sep 07, 2017  9:00 AM CDT   (Arrive by 8:45 AM)   CT ABDOMEN W/O & W CONTRAST with UCCT2   [facility] Imaging Center CT ([facility])    [address] [place] [address] [phone]            Please bring any scans or X-rays taken at other hospitals, if similar tests were done. Also bring a list of your medicines, including vitamins, minerals and over-the-counter drugs. It is safest to leave personal items at home.  Be sure to tell your doctor:   If you have any allergies.   If there s any chance you are pregnant.   If you are breastfeeding.   If you have any special needs.  You may have contrast for this exam. To prepare:   Do not eat or drink for 2 hours before your exam. If you need to take medicine, you may take it with small sips of water. (We may ask you to take liquid medicine as well.)   The day before your exam, drink extra fluids at least six 8-ounce glasses (unless your doctor tells you to restrict your fluids).  Patients over 70 or patients with diabetes or kidney problems:   If you haven t had a blood test (creatinine test) within the last 30 days, go to your clinic or Diagnostic Imaging Department for this test.  If you have diabetes:   If your kidney function is normal, continue taking your metformin (Avandamet, Glucophage, Glucovance, Metaglip) on the day of your exam.   If your kidney function is abnormal, wait 48 hours before restarting this medicine.  You will have oral contrast for this exam:   You will drink the contrast at home. Get this from your clinic or Diagnostic Imaging Department. Please follow the directions given.  Please wear loose clothing, such as a sweat suit or jogging clothes. Avoid snaps, zippers and other metal. We may ask you to undress and put on a hospital gown.  If you have any questions, please call the Imaging Department where you will have your exam.            Sep 07, 2017  9:20 AM CDT   (Arrive by 9:05 AM)   XR CHEST 2 VIEWS with UCXR1   [facility] Xray ([facility])    [address]  1st Floor  [facility] [address] [phone]           Please bring a list of your current medicines to your exam.  (Include vitamins, minerals and over-thecounter medicines.) Leave your valuables at home.  Tell your doctor if there is a chance you may be pregnant.  You do not need to do anything special for this exam.            Sep 07, 2017 10:00 AM CDT   (Arrive by 9:45 AM)   Return Visit with Jairo Cross MD   [facility] and [facility] for Prostate and Urologic Cancers ([facility])    [address]  [address]th [facility] [address] [phone]              Future tests that were ordered for you today     Open Future Orders        Priority Expected Expires Ordered    CT Abdomen w/o & w Contrast Routine  2018    Basic metabolic panel Routine  2023    CBC with platelets differential Routine  2023    XR Chest 2 Views Routine  2023            Who to contact     Please call your clinic at [phone] to:    Ask questions about your health    Make or cancel appointments    Discuss your medicines    Learn about your test results    Speak to your doctor   If you have compliments or concerns about an experience at your clinic, or if you wish to file a complaint, please contact [facility] Patient Relations at [phone] or email us at [email or web address]         Additional Information About Your Visit        [employer] Information     [employer] is an electronic gateway that provides easy, online access to your medical records. With [employer], you can request a clinic appointment, read your test results, renew a prescription or communicate with your care team.     To sign up for [employer] visit the website at [email or web address]   You will be asked to enter the access code listed below, as well as some personal information. Please follow the directions to create your username and password.     Your access code is: [email or web address]  Expires: 2017  6:30 AM     Your access code will  in 90  "days. If you need help or a new code, please contact your [facility] or call [phone] for assistance.        Care EveryWhere ID     This is your Care EveryWhere ID. This could be used by other organizations to access your [facility] medical records  [id number]        Your Vitals Were     Pulse Height BMI (Body Mass Index)             92 1.575 m (5' 2\") 26.34 kg/m2          Blood Pressure from Last 3 Encounters:   06/01/17 104/66   02/23/17 112/69   01/19/17 111/68    Weight from Last 3 Encounters:   06/01/17 65.3 kg (144 lb)   02/23/17 67.1 kg (148 lb)   01/16/17 66.2 kg (145 lb 15.1 oz)               Primary Care Provider Office Phone # Fax #    Dallas Parra PA-C [phone]       [facility] [place] [address]  [place] [address]        Thank you!     Thank you for choosing [facility] UROLOGY AND [facility] FOR PROSTATE AND UROLOGIC CANCERS  for your care. Our goal is always to provide you with excellent care. Hearing back from our patients is one way we can continue to improve our services. Please take a few minutes to complete the written survey that you may receive in the mail after your visit with us. Thank you!             Your Updated Medication List - Protect others around you: Learn how to safely use, store and throw away your medicines at www.disposemymeds.org.          This list is accurate as of: 6/1/17 10:28 AM.  Always use your most recent med list.                   Brand Name Dispense Instructions for use    * acetaminophen 325 MG tablet    TYLENOL     Take 325 mg by mouth as needed       * acetaminophen 325 MG tablet    TYLENOL    100 tablet    Take 2 tablets (650 mg) by mouth every 6 hours as needed for other (surgical pain)       Crutch Misc      As directed. For home use.       cyclobenzaprine 10 MG tablet    FLEXERIL     Take 10 mg by mouth       ibuprofen 600 MG tablet    ADVIL/MOTRIN    30 tablet    Take 1 tablet (600 mg) by mouth every 6 " hours as needed       levonorgestrel 20 MCG/24HR IUD    MIRENA     1 each by Intrauterine route once       naproxen 500 MG Tbec      Take 500 mg by mouth       ondansetron 4 MG ODT tab    ZOFRAN-ODT    10 tablet    Take 1 tablet (4 mg) by mouth every 6 hours as needed for nausea       oxyCODONE 5 MG IR tablet    ROXICODONE    30 tablet    Take 1-2 tablets (5-10 mg) by mouth every 3 hours as needed for moderate to severe pain       ranitidine 150 MG tablet    ZANTAC    42 tablet    Take 1 tablet (150 mg) by mouth 2 times daily For 14 days then once daily for 14 days then stop       senna-docusate 8.6-50 MG per tablet    SENOKOT-S;PERICOLACE    45 tablet    Take 1 tablet by mouth 2 times daily       * Notice:  This list has 2 medication(s) that are the same as other medications prescribed for you. Read the directions carefully, and ask your doctor or other care provider to review them with you.

## 2017-06-01 NOTE — NURSING NOTE
"Chief Complaint   Patient presents with     RECHECK     Follow up- [facility]       Initial Ht 1.575 m (5' 2\")  Wt 65.3 kg (144 lb)  BMI 26.34 kg/m2 Estimated body mass index is 26.34 kg/(m^2) as calculated from the following:    Height as of this encounter: 1.575 m (5' 2\").    Weight as of this encounter: 65.3 kg (144 lb).  Medication Reconciliation: complete     GABBY Levy    "

## 2017-07-29 ENCOUNTER — HEALTH MAINTENANCE LETTER (OUTPATIENT)
Age: 37
End: 2017-07-29

## 2017-09-05 ENCOUNTER — PRE VISIT (OUTPATIENT)
Dept: UROLOGY | Facility: CLINIC | Age: 37
End: 2017-09-05

## 2017-09-07 ENCOUNTER — OFFICE VISIT (OUTPATIENT)
Dept: UROLOGY | Facility: CLINIC | Age: 37
End: 2017-09-07

## 2017-09-07 VITALS
SYSTOLIC BLOOD PRESSURE: 119 MMHG | BODY MASS INDEX: 27.61 KG/M2 | DIASTOLIC BLOOD PRESSURE: 73 MMHG | HEART RATE: 74 BPM | HEIGHT: 63 IN | WEIGHT: 155.8 LBS

## 2017-09-07 DIAGNOSIS — C64.2 RENAL CANCER, LEFT (H): Primary | ICD-10-CM

## 2017-09-07 DIAGNOSIS — C64.2 RENAL CELL CANCER, LEFT (H): ICD-10-CM

## 2017-09-07 LAB
ANION GAP SERPL CALCULATED.3IONS-SCNC: 7 MMOL/L (ref 3–14)
BASOPHILS # BLD AUTO: 0 10E9/L (ref 0–0.2)
BASOPHILS NFR BLD AUTO: 0.4 %
BUN SERPL-MCNC: 9 MG/DL (ref 7–30)
CALCIUM SERPL-MCNC: 8.4 MG/DL (ref 8.5–10.1)
CHLORIDE SERPL-SCNC: 101 MMOL/L (ref 94–109)
CO2 SERPL-SCNC: 26 MMOL/L (ref 20–32)
CREAT SERPL-MCNC: 0.54 MG/DL (ref 0.52–1.04)
DIFFERENTIAL METHOD BLD: ABNORMAL
EOSINOPHIL # BLD AUTO: 0.1 10E9/L (ref 0–0.7)
EOSINOPHIL NFR BLD AUTO: 2.7 %
ERYTHROCYTE [DISTWIDTH] IN BLOOD BY AUTOMATED COUNT: 12.2 % (ref 10–15)
GFR SERPL CREATININE-BSD FRML MDRD: >90 ML/MIN/1.7M2
GLUCOSE SERPL-MCNC: 84 MG/DL (ref 70–99)
HCT VFR BLD AUTO: 35.3 % (ref 35–47)
HGB BLD-MCNC: 12.1 G/DL (ref 11.7–15.7)
IMM GRANULOCYTES # BLD: 0 10E9/L (ref 0–0.4)
IMM GRANULOCYTES NFR BLD: 0 %
LYMPHOCYTES # BLD AUTO: 1 10E9/L (ref 0.8–5.3)
LYMPHOCYTES NFR BLD AUTO: 40.2 %
MCH RBC QN AUTO: 31.1 PG (ref 26.5–33)
MCHC RBC AUTO-ENTMCNC: 34.3 G/DL (ref 31.5–36.5)
MCV RBC AUTO: 91 FL (ref 78–100)
MONOCYTES # BLD AUTO: 0.3 10E9/L (ref 0–1.3)
MONOCYTES NFR BLD AUTO: 11.2 %
NEUTROPHILS # BLD AUTO: 1.2 10E9/L (ref 1.6–8.3)
NEUTROPHILS NFR BLD AUTO: 45.5 %
NRBC # BLD AUTO: 0 10*3/UL
NRBC BLD AUTO-RTO: 0 /100
PLATELET # BLD AUTO: 166 10E9/L (ref 150–450)
POTASSIUM SERPL-SCNC: 3.6 MMOL/L (ref 3.4–5.3)
RBC # BLD AUTO: 3.89 10E12/L (ref 3.8–5.2)
SODIUM SERPL-SCNC: 135 MMOL/L (ref 133–144)
WBC # BLD AUTO: 2.6 10E9/L (ref 4–11)

## 2017-09-07 ASSESSMENT — PAIN SCALES - GENERAL: PAINLEVEL: MILD PAIN (3)

## 2017-09-07 NOTE — LETTER
"9/7/2017       RE: Brynn Sprague  [address]  [place] [address]     Dear Colleague,    Thank you for referring your patient, Brynn Sprague, to the [facility] UROLOGY AND [facility]OLOGIC [facility]CERS at [facility]. Please see a copy of my visit note below.    Kidney Cancer Follow up     Brynn Sprague is a very pleasant 36 year old female who presents with a history of a Renal Cell Cancer.    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    ISUP Grade: na  Pathologic Stage T2a see below*.    Maximal tumor dimension was 8.7 cm.    Tumor thrombus level  not applicable.    Tumor necrosis present: No  Sarcomatoid features present: No  Patient is status post Robotic Partial Nephrectomy on 1/16/2017.   Tumor was on the left side.     She reports some abdominal/flank pain on the left side while sleeping. She states this started about 2 weeks ago.      There is no evidence of disease recurrence to date.    Physical Exam  /73  Pulse 74  Ht 1.6 m (5' 3\")  Wt 70.7 kg (155 lb 12.8 oz)  BMI 27.6 kg/m2    Abd is soft, non-distended, incisions are healing well, no hernia  An area on the abd that itches even before surgery    Recent Kidney Function    Lab Results   Component Value Date    CR 0.54 09/07/2017    CR 0.51 06/01/2017    CR 0.61 01/19/2017    CR 0.57 01/18/2017    CR 0.55 01/17/2017    CR 0.54 01/16/2017    CR 0.58 12/16/2016    CR 0.57 03/19/2012       Hemoglobin   Date Value Ref Range Status   09/07/2017 12.1 11.7 - 15.7 g/dL Final       Chest imaging demonstrates no evidence of recurrence  Abdominal imaging demonstrates no evidence of recurrence    Assessment/Plan:     Hx of Renal Cell Cancer [id number], chromophobe, s/p Robotic Partial Nephrectomy   Follow up in 3-4 months     CT of Abd with and without IV contrast, no oral contrast needed    Chest CT scan    BMP, CBC      Scribe Disclosure:   Todd DEMACRO, am serving as a scribe; to document services " "personally performed by Jairo Cross MD based on data collection and the provider's statements to me.     Jairo Cross MD  Department of Urology  [facility]    Attestation:  This patient was seen and evaluated by me, with a scribe taking notes.  I have reviewed the note above and agree.  The physical exam was performed by me and the pertinant details are outlined below.     Patient is a 36 year old  female   Vitals: Blood pressure 119/73, pulse 74, height 1.6 m (5' 3\"), weight 70.7 kg (155 lb 12.8 oz), not currently breastfeeding.  General Appearance Adult: Alert, no acute distress, oriented      Assessment/Plan:     Hx of Renal Cell Cancer [id number], chromophobe, s/p Robotic Partial Nephrectomy   Follow up in 3-4 months     CT of Abd with and without IV contrast, no oral contrast needed    Chest CT scan    BMP, CBC    Jairo Cross MD  Department of Urology  [facility]        *AJCC/pTNM staging 2010 where   T1a (<=4cm), T1b  (4-7cm)  T2a (7-10 cm), T2b (>10 cm)   T3a (renal vein thrombus, perirenal or renal sinus fat invasion)   T3b (tumor thrombus to IVC, below diaphragm)  T3c (tumor thrombus to IVC, above diaphragm)   T4 (tumor invades beyond Gerota's fascia)  Nx regional nodes not assessed  N0 No lymph node metastasis  N1 Lymph node metastasis  M0 No distant Mets  M1 Distant Mestastasis      Again, thank you for allowing me to participate in the care of your patient.      Sincerely,    Jairo Cross MD      "

## 2017-09-07 NOTE — NURSING NOTE
"Chief Complaint   Patient presents with     RECHECK     Follow up- [facility] with labs/imaging       Initial /73  Pulse 74  Ht 1.6 m (5' 3\")  Wt 70.7 kg (155 lb 12.8 oz)  BMI 27.6 kg/m2 Estimated body mass index is 27.6 kg/(m^2) as calculated from the following:    Height as of this encounter: 1.6 m (5' 3\").    Weight as of this encounter: 70.7 kg (155 lb 12.8 oz).  Medication Reconciliation: complete     GABBY Levy    "

## 2017-09-07 NOTE — PATIENT INSTRUCTIONS
Follow up with Dr. Cross in 4 months with labs and imaging prior to appointment.    It was a pleasure meeting with you today.  Thank you for allowing me and my team the privilege of caring for you today.  YOU are the reason we are here, and I truly hope we provided you with the excellent service you deserve.  Please let us know if there is anything else we can do for you so that we can be sure you are leaving completely satisfied with your care experience.      GABBY Levy

## 2017-09-07 NOTE — PROGRESS NOTES
"Kidney Cancer Follow up     Brynn Sprague is a very pleasant 36 year old female who presents with a history of a Renal Cell Cancer.    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    ISUP Grade: na  Pathologic Stage T2a see below*.    Maximal tumor dimension was 8.7 cm.    Tumor thrombus level  not applicable.    Tumor necrosis present: No  Sarcomatoid features present: No  Patient is status post Robotic Partial Nephrectomy on 1/16/2017.   Tumor was on the left side.     She reports some abdominal/flank pain on the left side while sleeping. She states this started about 2 weeks ago.      There is no evidence of disease recurrence to date.    Physical Exam  /73  Pulse 74  Ht 1.6 m (5' 3\")  Wt 70.7 kg (155 lb 12.8 oz)  BMI 27.6 kg/m2    Abd is soft, non-distended, incisions are healing well, no hernia  An area on the abd that itches even before surgery    Recent Kidney Function    Lab Results   Component Value Date    CR 0.54 09/07/2017    CR 0.51 06/01/2017    CR 0.61 01/19/2017    CR 0.57 01/18/2017    CR 0.55 01/17/2017    CR 0.54 01/16/2017    CR 0.58 12/16/2016    CR 0.57 03/19/2012       Hemoglobin   Date Value Ref Range Status   09/07/2017 12.1 11.7 - 15.7 g/dL Final       Chest imaging demonstrates no evidence of recurrence  Abdominal imaging demonstrates no evidence of recurrence    Assessment/Plan:     Hx of Renal Cell Cancer [id number], chromophobe, s/p Robotic Partial Nephrectomy   Follow up in 3-4 months     CT of Abd with and without IV contrast, no oral contrast needed    Chest CT scan    BMP, CBC      Scribe Disclosure:   ITodd, am serving as a scribe; to document services personally performed by Jairo Cross MD based on data collection and the provider's statements to me.     Jairo Cross MD  Department of Urology  [facility]    Attestation:  This patient was seen and evaluated by me, with a scribe taking notes.  I have reviewed the note above and " "agree.  The physical exam was performed by me and the pertinant details are outlined below.     Patient is a 36 year old  female   Vitals: Blood pressure 119/73, pulse 74, height 1.6 m (5' 3\"), weight 70.7 kg (155 lb 12.8 oz), not currently breastfeeding.  General Appearance Adult: Alert, no acute distress, oriented      Assessment/Plan:     Hx of Renal Cell Cancer [id number], chromophobe, s/p Robotic Partial Nephrectomy   Follow up in 3-4 months     CT of Abd with and without IV contrast, no oral contrast needed    Chest CT scan    BMP, CBC    Jairo Cross MD  Department of Urology  [facility]        *AJCC/pTNM staging 2010 where   T1a (<=4cm), T1b  (4-7cm)  T2a (7-10 cm), T2b (>10 cm)   T3a (renal vein thrombus, perirenal or renal sinus fat invasion)   T3b (tumor thrombus to IVC, below diaphragm)  T3c (tumor thrombus to IVC, above diaphragm)   T4 (tumor invades beyond Gerota's fascia)  Nx regional nodes not assessed  N0 No lymph node metastasis  N1 Lymph node metastasis  M0 No distant Mets  M1 Distant Mestastasis    "

## 2017-09-07 NOTE — MR AVS SNAPSHOT
After Visit Summary   9/7/2017    Brynn Sprague    MRN: [id number]           Patient Information     Date Of Birth          1980        Visit Information        Provider Department      9/7/2017 10:00 AM Jairo Cross MD [facility] Urology and [facility] for Prostate and Urologic Cancers        Today's Diagnoses     Renal cancer, left (H)    -  1      Care Instructions    Follow up with Dr. Cross in 4 months with labs and imaging prior to appointment.    It was a pleasure meeting with you today.  Thank you for allowing me and my team the privilege of caring for you today.  YOU are the reason we are here, and I truly hope we provided you with the excellent service you deserve.  Please let us know if there is anything else we can do for you so that we can be sure you are leaving completely satisfied with your care experience.      Dorcas Reyes RITA          Follow-ups after your visit        Your next 10 appointments already scheduled     Jan 11, 2018  8:45 AM CST   LAB with [facility])    [address] [place] [address] [phone]           Patient must bring picture ID. Patient should be prepared to give a urine specimen  Please do not eat 10-12 hours before your appointment if you are coming in fasting for labs on lipids, cholesterol, or glucose (sugar). Pregnant women should follow their Care Team instructions. Water with medications is okay. Do not drink coffee or other fluids. If you have concerns about taking  your medications, please ask at office or if scheduling via PrecisionDemandhart, send a message by clicking on Secure Messaging, Message Your Care Team.            Jan 11, 2018  9:00 AM CST   (Arrive by 8:45 AM)   CT ABDOMEN W/O & W CONTRAST with UCCT1   [facility] [place] [facility] ([facility])    [address] [place] [address] [phone]           Please  bring any scans or X-rays taken at other hospitals, if similar tests were done. Also bring a list of your medicines, including vitamins, minerals and over-the-counter drugs. It is safest to leave personal items at home.  Be sure to tell your doctor:   If you have any allergies.   If there s any chance you are pregnant.   If you are breastfeeding.   If you have any special needs.  You may have contrast for this exam. To prepare:   Do not eat or drink for 2 hours before your exam. If you need to take medicine, you may take it with small sips of water. (We may ask you to take liquid medicine as well.)   The day before your exam, drink extra fluids at least six 8-ounce glasses (unless your doctor tells you to restrict your fluids).  Patients over 70 or patients with diabetes or kidney problems:   If you haven t had a blood test (creatinine test) within the last 30 days, go to your clinic or Diagnostic Imaging Department for this test.  If you have diabetes:   If your kidney function is normal, continue taking your metformin (Avandamet, Glucophage, Glucovance, Metaglip) on the day of your exam.   If your kidney function is abnormal, wait 48 hours before restarting this medicine.  You will have oral contrast for this exam:   You will drink the contrast at home. Get this from your clinic or Diagnostic Imaging Department. Please follow the directions given.  Please wear loose clothing, such as a sweat suit or jogging clothes. Avoid snaps, zippers and other metal. We may ask you to undress and put on a hospital gown.  If you have any questions, please call the Imaging Department where you will have your exam.            Jan 11, 2018  9:20 AM CST   (Arrive by 9:05 AM)   XR CHEST 2 VIEWS with UCXR1   [facility] ([facility])    [address]  1st Floor  [facility] [address] [phone]           Please bring a list of your current medicines to your exam. (Include  vitamins, minerals and over-thecounter medicines.) Leave your valuables at home.  Tell your doctor if there is a chance you may be pregnant.  You do not need to do anything special for this exam.            2018 10:00 AM CST   (Arrive by 9:45 AM)   Return Visit with Jairo Cross MD   [facility] Urology and [facility] for Prostate and Urologic Cancers ([facility])    [address]  4th Floor  [facility] [id number] [phone]              Future tests that were ordered for you today     Open Future Orders        Priority Expected Expires Ordered    CBC with platelets differential Routine  2018    XR Chest 2 Views Routine  2018    CT Abdomen w/o & w Contrast Routine  2018    Basic metabolic panel Routine  2018            Who to contact     Please call your clinic at [phone] to:    Ask questions about your health    Make or cancel appointments    Discuss your medicines    Learn about your test results    Speak to your doctor   If you have compliments or concerns about an experience at your clinic, or if you wish to file a complaint, please contact [facility] Patient Relations at [phone] or email us at [email or web address]         Additional Information About Your Visit        xaitmenthart Information     Global Grind is an electronic gateway that provides easy, online access to your medical records. With Global Grind, you can request a clinic appointment, read your test results, renew a prescription or communicate with your care team.     To sign up for QA on Requestt visit the website at [email or web address]   You will be asked to enter the access code listed below, as well as some personal information. Please follow the directions to create your username and password.     Your access code is: [email or web address]  Expires: 2017  6:30 AM     Your access code will  in 90 days. If  "you need help or a new code, please contact your [facility] or call [phone] for assistance.        Care EveryWhere ID     This is your Care EveryWhere ID. This could be used by other organizations to access your [place] medical records  [id number]        Your Vitals Were     Pulse Height BMI (Body Mass Index)             74 1.6 m (5' 3\") 27.6 kg/m2          Blood Pressure from Last 3 Encounters:   09/07/17 119/73   06/01/17 104/66   02/23/17 112/69    Weight from Last 3 Encounters:   09/07/17 70.7 kg (155 lb 12.8 oz)   06/01/17 65.3 kg (144 lb)   02/23/17 67.1 kg (148 lb)                 Today's Medication Changes          These changes are accurate as of: 9/7/17 10:46 AM.  If you have any questions, ask your nurse or doctor.               These medicines have changed or have updated prescriptions.        Dose/Directions    acetaminophen 325 MG tablet   Commonly known as:  TYLENOL   This may have changed:  Another medication with the same name was removed. Continue taking this medication, and follow the directions you see here.   Used for:  Renal cancer, left (H)        Dose:  650 mg   Take 2 tablets (650 mg) by mouth every 6 hours as needed for other (surgical pain)   Quantity:  100 tablet   Refills:  1         Stop taking these medicines if you haven't already. Please contact your care team if you have questions.     Moises Misc   Stopped by:  Jairo Cross MD           cyclobenzaprine 10 MG tablet   Commonly known as:  FLEXERIL   Stopped by:  Jairo Cross MD           naproxen 500 MG Tbec   Stopped by:  Jairo Cross MD           ondansetron 4 MG ODT tab   Commonly known as:  ZOFRAN-ODT   Stopped by:  Jairo Cross MD           oxyCODONE 5 MG IR tablet   Commonly known as:  ROXICODONE   Stopped by:  Jairo Cross MD           ranitidine 150 MG tablet   Commonly known as:  ZANTAC   Stopped by:  Jairo Cross" MD Matthew                    Primary Care Provider Office Phone # Fax #    [place] Rupa Parra PA-C [phone]       [address] [place] [address]        Equal Access to Services     [facility] JOSE LUIS : Hadii aad ku hadbenjamino Soomaali, waaxda luqadaha, qaybta kaalmada adeegyada, waxyin idiin ishann ademacy marr larenettaellen sam. So [facility] [phone].    ATENCIÓN: Si habla español, tiene a olvera disposición servicios gratuitos de asistencia lingüística. Llame al [phone].    We comply with applicable federal civil rights laws and [place] laws. We do not discriminate on the basis of race, color, national origin, age, disability sex, sexual orientation or gender identity.            Thank you!     Thank you for choosing [facility] UROLOGY AND [facility] FOR PROSTATE AND UROLOGIC CANCERS  for your care. Our goal is always to provide you with excellent care. Hearing back from our patients is one way we can continue to improve our services. Please take a few minutes to complete the written survey that you may receive in the mail after your visit with us. Thank you!             Your Updated Medication List - Protect others around you: Learn how to safely use, store and throw away your medicines at www.disposemymeds.org.          This list is accurate as of: 9/7/17 10:46 AM.  Always use your most recent med list.                   Brand Name Dispense Instructions for use Diagnosis    acetaminophen 325 MG tablet    TYLENOL    100 tablet    Take 2 tablets (650 mg) by mouth every 6 hours as needed for other (surgical pain)    Renal cancer, left (H)       ibuprofen 600 MG tablet    ADVIL/MOTRIN    30 tablet    Take 1 tablet (600 mg) by mouth every 6 hours as needed    Renal cancer, left (H)       levonorgestrel 20 MCG/24HR IUD    MIRENA     1 each by Intrauterine route once        senna-docusate 8.6-50 MG per tablet    SENOKOT-S;PERICOLACE    45 tablet    Take 1 tablet by mouth 2 times daily    Renal cancer, left (H)

## 2017-09-11 ENCOUNTER — TELEPHONE (OUTPATIENT)
Dept: FAMILY MEDICINE | Facility: CLINIC | Age: 37
End: 2017-09-11

## 2017-09-11 NOTE — TELEPHONE ENCOUNTER
Panel Management Review      Patient has the following on her problem list: None      Composite cancer screening  Chart review shows that this patient is due/due soon for the following Pap Smear  Summary:    Patient is due/failing the following:   Physical and pap    Type of outreach:    Phone, spoke to patient.  [facility]t [facility] for 9[facility]19/17    Questions for provider review:    None                                                                                                                                    Zuri Delgado MA       Chart routed to Care Team .

## 2017-09-21 ENCOUNTER — OFFICE VISIT (OUTPATIENT)
Dept: FAMILY MEDICINE | Facility: CLINIC | Age: 37
End: 2017-09-21
Payer: COMMERCIAL

## 2017-09-21 VITALS
BODY MASS INDEX: 27.57 KG/M2 | TEMPERATURE: 98.2 F | DIASTOLIC BLOOD PRESSURE: 68 MMHG | WEIGHT: 155.6 LBS | HEIGHT: 63 IN | SYSTOLIC BLOOD PRESSURE: 102 MMHG | OXYGEN SATURATION: 99 % | HEART RATE: 70 BPM

## 2017-09-21 DIAGNOSIS — R10.823 RIGHT LOWER QUADRANT ABDOMINAL TENDERNESS WITH REBOUND TENDERNESS: ICD-10-CM

## 2017-09-21 DIAGNOSIS — Z01.00 EXAMINATION OF EYES AND VISION: ICD-10-CM

## 2017-09-21 DIAGNOSIS — Z11.51 SCREENING FOR HUMAN PAPILLOMAVIRUS: ICD-10-CM

## 2017-09-21 DIAGNOSIS — Z13.29 SCREENING FOR THYROID DISORDER: ICD-10-CM

## 2017-09-21 DIAGNOSIS — Z12.4 SCREENING FOR CERVICAL CANCER: Primary | ICD-10-CM

## 2017-09-21 DIAGNOSIS — N89.8 VAGINAL DISCHARGE: ICD-10-CM

## 2017-09-21 DIAGNOSIS — Z13.220 SCREENING FOR LIPOID DISORDERS: ICD-10-CM

## 2017-09-21 DIAGNOSIS — Z13.1 SCREENING FOR DIABETES MELLITUS: ICD-10-CM

## 2017-09-21 LAB
SPECIMEN SOURCE: NORMAL
WET PREP SPEC: NORMAL

## 2017-09-21 PROCEDURE — 87210 SMEAR WET MOUNT SALINE/INK: CPT | Performed by: NURSE PRACTITIONER

## 2017-09-21 PROCEDURE — G0476 HPV COMBO ASSAY CA SCREEN: HCPCS | Performed by: NURSE PRACTITIONER

## 2017-09-21 PROCEDURE — 99213 OFFICE O/P EST LOW 20 MIN: CPT | Mod: 25 | Performed by: NURSE PRACTITIONER

## 2017-09-21 PROCEDURE — 99395 PREV VISIT EST AGE 18-39: CPT | Performed by: NURSE PRACTITIONER

## 2017-09-21 PROCEDURE — G0145 SCR C/V CYTO,THINLAYER,RESCR: HCPCS | Performed by: NURSE PRACTITIONER

## 2017-09-21 NOTE — PATIENT INSTRUCTIONS
IUD Oct of 2015    Call [phone] to schedule fasting labs, or complete them at your work.    Follow up for any health care questions or concerns.  Preventive Health Recommendations  Female Ages 26 - 39  Yearly exam:   See your health care provider every year in order to    Review health changes.     Discuss preventive care.      Review your medicines if you your doctor has prescribed any.    Until age 30: Get a Pap test every three years (more often if you have had an abnormal result).    After age 30: Talk to your doctor about whether you should have a Pap test every 3 years or have a Pap test with HPV screening every 5 years.   You do not need a Pap test if your uterus was removed (hysterectomy) and you have not had cancer.  You should be tested each year for STDs (sexually transmitted diseases), if you're at risk.   Talk to your provider about how often to have your cholesterol checked.  If you are at risk for diabetes, you should have a diabetes test (fasting glucose).  Shots: Get a flu shot each year. Get a tetanus shot every 10 years.   Nutrition:     Eat at least 5 servings of fruits and vegetables each day.    Eat whole-grain bread, whole-wheat pasta and brown rice instead of white grains and rice.    Talk to your provider about Calcium and Vitamin D.     Lifestyle    Exercise at least 150 minutes a week (30 minutes a day, 5 days of the week). This will help you control your weight and prevent disease.    Limit alcohol to one drink per day.    No smoking.     Wear sunscreen to prevent skin cancer.    See your dentist every six months for an exam and cleaning.

## 2017-09-21 NOTE — MR AVS SNAPSHOT
After Visit Summary   9/21/2017    Brynn Sprague    MRN: [id number]           Patient Information     Date Of Birth          1980        Visit Information        Provider Department      9/21/2017 2:20 PM Ileana Esteves NP [facility]        Today's Diagnoses     Screening for cervical cancer    -  1    Screening for human papillomavirus        Vaginal discharge        Screening for lipoid disorders        Screening for diabetes mellitus        Screening for thyroid disorder        Examination of eyes and vision        Right lower quadrant abdominal tenderness with rebound tenderness          Care Instructions    IUD Oct of 2015    Call [phone] to schedule fasting labs, or complete them at your work.    Follow up for any kamala care questions or concerns.  Preventive Health Recommendations  Female Ages 26 - 39  Yearly exam:   See your health care provider every year in order to    Review health changes.     Discuss preventive care.      Review your medicines if you your doctor has prescribed any.    Until age 30: Get a Pap test every three years (more often if you have had an abnormal result).    After age 30: Talk to your doctor about whether you should have a Pap test every 3 years or have a Pap test with HPV screening every 5 years.   You do not need a Pap test if your uterus was removed (hysterectomy) and you have not had cancer.  You should be tested each year for STDs (sexually transmitted diseases), if you're at risk.   Talk to your provider about how often to have your cholesterol checked.  If you are at risk for diabetes, you should have a diabetes test (fasting glucose).  Shots: Get a flu shot each year. Get a tetanus shot every 10 years.   Nutrition:     Eat at least 5 servings of fruits and vegetables each day.    Eat whole-grain bread, whole-wheat pasta and brown rice instead of white grains and rice.    Talk to your provider about Calcium and Vitamin D.      Lifestyle    Exercise at least 150 minutes a week (30 minutes a day, 5 days of the week). This will help you control your weight and prevent disease.    Limit alcohol to one drink per day.    No smoking.     Wear sunscreen to prevent skin cancer.    See your dentist every six months for an exam and cleaning.            Follow-ups after your visit        Additional Services     OPTOMETRY REFERRAL       Your provider has referred you to: FMG: [facility] [phone]   [email or web address]  FMG: [facility] [phone]    [email or web address]  FHN: [facility] [phone]   [email or web address]    Please be aware that coverage of these services is subject to the terms and limitations of your health insurance plan.  Call member services at your health plan with any benefit or coverage questions.      Please bring the following with you to your appointment:    (1) Any X-Rays, CTs or MRIs which have been performed.  Contact the facility where they were done to arrange for  prior to your scheduled appointment.    (2) List of current medications  (3) This referral request   (4) Any documents/labs given to you for this referral                  Follow-up notes from your care team     Return if symptoms worsen or fail to improve.      Your next 10 appointments already scheduled     Jan 11, 2018  8:45 AM CST   LAB with [facility] ([facility])    [address] [place] [address] [phone]           Patient must bring picture ID. Patient should be prepared to give a urine specimen  Please do not eat 10-12 hours before your appointment if you are coming in fasting for labs on lipids, cholesterol, or glucose (sugar). Pregnant women should follow their Care Team instructions. Water with medications is okay. Do not drink  coffee or other fluids. If you have concerns about taking  your medications, please ask at office or if scheduling via [email or web address], send a message by clicking on Secure Messaging, Message Your Care Team.            Jan 11, 2018  9:00 AM CST   (Arrive by 8:45 AM)   CT ABDOMEN W/O & W CONTRAST with UCCT1   [facility] CT ([facility])    [address] [address] [phone]           Please bring any scans or X-rays taken at other hospitals, if similar tests were done. Also bring a list of your medicines, including vitamins, minerals and over-the-counter drugs. It is safest to leave personal items at home.  Be sure to tell your doctor:   If you have any allergies.   If there s any chance you are pregnant.   If you are breastfeeding.   If you have any special needs.  You may have contrast for this exam. To prepare:   Do not eat or drink for 2 hours before your exam. If you need to take medicine, you may take it with small sips of water. (We may ask you to take liquid medicine as well.)   The day before your exam, drink extra fluids at least six 8-ounce glasses (unless your doctor tells you to restrict your fluids).  Patients over 70 or patients with diabetes or kidney problems:   If you haven t had a blood test (creatinine test) within the last 30 days, go to your clinic or Diagnostic Imaging Department for this test.  If you have diabetes:   If your kidney function is normal, continue taking your metformin (Avandamet, Glucophage, Glucovance, Metaglip) on the day of your exam.   If your kidney function is abnormal, wait 48 hours before restarting this medicine.  You will have oral contrast for this exam:   You will drink the contrast at home. Get this from your clinic or Diagnostic Imaging Department. Please follow the directions given.  Please wear loose clothing, such as a sweat suit or jogging clothes. Avoid snaps, zippers and other metal. We may  ask you to undress and put on a hospital gown.  If you have any questions, please call the Imaging Department where you will have your exam.            Jan 11, 2018  9:20 AM CST   (Arrive by 9:05 AM)   XR CHEST 2 VIEWS with UCXR1   [facility] [place] Xray ([facility])    [address]  1st Floor  [facility] [address] [phone]           Please bring a list of your current medicines to your exam. (Include vitamins, minerals and over-thecounter medicines.) Leave your valuables at home.  Tell your doctor if there is a chance you may be pregnant.  You do not need to do anything special for this exam.            Jan 11, 2018 10:00 AM CST   (Arrive by 9:45 AM)   Return Visit with Jairo Cross MD   [facility] Urology and [facility] for Prostate and Urologic Cancers ([facility])    [address]  4th [facility] [id number] [phone]              Future tests that were ordered for you today     Open Future Orders        Priority Expected Expires Ordered    US Pelvic Complete w Transvaginal Routine  9/21/2018 9/21/2017    Lipid panel reflex to direct LDL Routine  9/21/2018 9/21/2017    Glucose whole blood Routine  9/21/2018 9/21/2017    TSH with free T4 reflex Routine  9/21/2018 9/21/2017            Who to contact     Normal or non-critical lab and imaging results will be communicated to you by MyChart, letter or phone within 4 business days after the clinic has received the results. If you do not hear from us within 7 days, please contact the clinic through MyChart or phone. If you have a critical or abnormal lab result, we will notify you by phone as soon as possible.  Submit refill requests through CloudArena or call your pharmacy and they will forward the refill request to us. Please allow 3 business days for your refill to be completed.          If you need to speak with a  for additional information , please  "call: [phone]             Additional Information About Your Visit        Care EveryWhere ID     This is your Care EveryWhere ID. This could be used by other organizations to access your [place] medical records  [id number]        Your Vitals Were     Pulse Temperature Height Last Period Pulse Oximetry Breastfeeding?    70 98.2  F (36.8  C) (Oral) 5' 3\" (1.6 m) (Approximate) 99% No    BMI (Body Mass Index)                   27.56 kg/m2            Blood Pressure from Last 3 Encounters:   09/21/17 102/68   09/07/17 119/73   06/01/17 104/66    Weight from Last 3 Encounters:   09/21/17 155 lb 9.6 oz (70.6 kg)   09/07/17 155 lb 12.8 oz (70.7 kg)   06/01/17 144 lb (65.3 kg)              We Performed the Following     HPV High Risk Types DNA Cervical     OPTOMETRY REFERRAL     Pap imaged thin layer screen with HPV - recommended age 30 - 65 years (select HPV order below)     Wet prep        Primary Care Provider Office Phone # Fax #    [place] Parra PA-C [phone] [address] [facility] [address]        Equal Access to Services     KATELYNN AMAYA AH: Hadii aad ku hadasho Soomaali, waaxda luqadaha, qaybta kaalmada adeegyada, graciela vargas. So [facility] [place] [facility] [phone].    ATENCIÓN: Si habla español, tiene a olvera disposición servicios gratuitos de asistencia lingüística. [facility] [phone].    We comply with applicable federal civil rights laws and [place] laws. We do not discriminate on the basis of race, color, national origin, age, disability sex, sexual orientation or gender identity.            Thank you!     Thank you for choosing [facility]  for your care. Our goal is always to provide you with excellent care. Hearing back from our patients is one way we can continue to improve our services. Please take a few minutes to complete the written survey that you may receive in the mail after your visit with us. Thank you!             Your Updated Medication " List - Protect others around you: Learn how to safely use, store and throw away your medicines at www.disposemymeds.org.          This list is accurate as of: 9/21/17  2:56 PM.  Always use your most recent med list.                   Brand Name Dispense Instructions for use Diagnosis    levonorgestrel 20 MCG/24HR IUD    MIRENA     1 each by Intrauterine route once

## 2017-09-21 NOTE — LETTER
October 5, 2017    Brynn Sprague  [address] [place] [address]    Dear Brynn,  We are happy to inform you that your PAP smear result from 9/21/17 is normal.  We are now able to do a follow up test on PAP smears. The DNA test is for HPV (Human Papilloma Virus). Cervical cancer is closely linked with certain types of HPV. Your result showed no evidence of high risk HPV.  Therefore we recommend you return in 3 years for your next pap smear.  You will still need to return to the clinic every year for an annual exam and other preventive tests.  Please contact the clinic at [phone] with any questions.  Sincerely,    Ileana Esteves NP/eliseo

## 2017-09-21 NOTE — NURSING NOTE
"Chief Complaint   Patient presents with     Physical       Initial /68  Pulse 70  Temp 98.2  F (36.8  C) (Oral)  Ht 5' 3\" (1.6 m)  Wt 155 lb 9.6 oz (70.6 kg)  LMP  (Approximate)  SpO2 99%  Breastfeeding? No  BMI 27.56 kg/m2 Estimated body mass index is 27.56 kg/(m^2) as calculated from the following:    Height as of this encounter: 5' 3\" (1.6 m).    Weight as of this encounter: 155 lb 9.6 oz (70.6 kg).  Medication Reconciliation: complete     Zuri Delgado MA  "

## 2017-09-21 NOTE — PROGRESS NOTES
SUBJECTIVE:   CC: Brynn Sprague is an 36 year old woman who presents for preventive health visit.     Healthy Habits:    Do you get at least three servings of calcium containing foods daily (dairy, green leafy vegetables, etc.)? yes    Amount of exercise or daily activities, outside of work: 0 day(s) per week    Problems taking medications regularly No    Medication side effects: No    Have you had an eye exam in the past two years? no    Do you see a dentist twice per year? yes    Do you have sleep apnea, excessive snoring or daytime drowsiness?no    Patient/Parent of patient informed that anything we discuss that is not related to preventative medicine, may be billed for; patient verbalizes understanding.    Concern(s):  1. iud questions- light vaginal bleeding daily, normal menses.       Today's PHQ-2 Score:   PHQ-2 ( 1999 Pf[employer]) 12/16/2016 8/15/2016   Q1: Little interest or pleasure in doing things 0 0   Q2: Feeling down, depressed or hopeless 0 0   PHQ-2 Score 0 0         Abuse: Current or Past(Physical, Sexual or Emotional)- No  Do you feel safe in your environment - Yes  Social History   Substance Use Topics     Smoking status: Never Smoker     Smokeless tobacco: Never Used     Alcohol use No     The patient does not drink >3 drinks per day nor >7 drinks per week.    Reviewed orders with patient.  Reviewed health maintenance and updated orders accordingly - Yes  Labs reviewed in EPIC  BP Readings from Last 3 Encounters:   09/21/17 102/68   09/07/17 119/73   06/01/17 104/66    Wt Readings from Last 3 Encounters:   09/21/17 155 lb 9.6 oz (70.6 kg)   09/07/17 155 lb 12.8 oz (70.7 kg)   06/01/17 144 lb (65.3 kg)                  Patient Active Problem List   Diagnosis     CARDIOVASCULAR SCREENING; LDL GOAL LESS THAN 160     Cervical polyp     Impingement syndrome of right shoulder     Renal cancer (H)     Past Surgical History:   Procedure Laterality Date     DAVINCI NEPHRECTOMY PARTIAL Left 1/16/2017     Procedure: DAVINCI NEPHRECTOMY PARTIAL;  Surgeon: Jairo Cross MD;  Location: [facility] OR     INSERT INTRAUTERINE DEVICE  10/16/15       Social History   Substance Use Topics     Smoking status: Never Smoker     Smokeless tobacco: Never Used     Alcohol use No     Family History   Problem Relation Age of Onset     Family History Negative Mother      Family History Negative Father      CANCER No family hx of          Current Outpatient Prescriptions   Medication Sig Dispense Refill     levonorgestrel (MIRENA) 20 MCG/24HR IUD 1 each by Intrauterine route once       Allergies   Allergen Reactions     Nkda [No Known Drug Allergies]            Mammogram not appropriate for this patient based on age.    Pertinent mammograms are reviewed under the imaging tab.  History of abnormal Pap smear: NO - age 30- 65 PAP every 3 years recommended    Reviewed and updated as needed this visit by clinical staffTobacco  Meds  Med Hx  Surg Hx  Fam Hx  Soc Hx        Reviewed and updated as needed this visit by Provider        Past Medical History:   Diagnosis Date     Chronic neutropenia (H)     possibly benign essential neutropenia     GERD (gastroesophageal reflux disease)      Left renal mass      Normal pregnancy ,,,     Pulmonary nodules      Renal cell carcinoma (H)       Past Surgical History:   Procedure Laterality Date     DAVINCI NEPHRECTOMY PARTIAL Left 2017    Procedure: DAVINCI NEPHRECTOMY PARTIAL;  Surgeon: Jairo Cross MD;  Location: [facility] OR     INSERT INTRAUTERINE DEVICE  10/16/15     Obstetric History       T4      L4     SAB1   TAB0   Ectopic0   Multiple0   Live Births4       # Outcome Date GA Lbr Armando/2nd Weight Sex Delivery Anes PTL Lv   6 Term 12 40w3d  7 lb (3.175 kg) F    RAISSA      Name: Feenaa   5 Term  39w3d  7 lb (3.175 kg) M Vag-Spont   RAISSA      Name: Natanaan   4 Term  39w4d  7 lb 5 oz (3.317 kg) F Vag-Spont   RAISSA      Name: Aanatii   3 Term  "08/2006 39w4d  7 lb 8 oz (3.402 kg) M Vag-Spont   RAISSA      Name: Maral Morales   2 SAB 2004        DEC   1 TAB 2001        DEC          ROS:  C: NEGATIVE for fever, chills, change in weight  I: NEGATIVE for worrisome rashes, moles or lesions  E: NEGATIVE for vision changes or irritation  ENT: NEGATIVE for ear, mouth and throat problems  R: NEGATIVE for significant cough or SOB  B: NEGATIVE for masses, tenderness or discharge  CV: NEGATIVE for chest pain, palpitations or peripheral edema  GI: NEGATIVE for nausea, abdominal pain, heartburn, or change in bowel habits  : NEGATIVE for unusual urinary or vaginal symptoms. Periods are regular; POSITIVE for daily light vaginal bleeding, has IUD  M: NEGATIVE for significant arthralgias or myalgia  N: NEGATIVE for weakness, dizziness or paresthesias  E: NEGATIVE for temperature intolerance, skin/hair changes  H: NEGATIVE for bleeding problems  P: NEGATIVE for changes in mood or affect    OBJECTIVE:   /68  Pulse 70  Temp 98.2  F (36.8  C) (Oral)  Ht 5' 3\" (1.6 m)  Wt 155 lb 9.6 oz (70.6 kg)  LMP  (Approximate)  SpO2 99%  Breastfeeding? No  BMI 27.56 kg/m2  EXAM:  GENERAL: healthy, alert and no distress  EYES: Eyes grossly normal to inspection, PERRL and conjunctivae and sclerae normal  HENT: ear canals and TM's normal, nose and mouth without ulcers or lesions  NECK: no adenopathy, no asymmetry, masses, or scars and thyroid normal to palpation  RESP: lungs clear to auscultation - no rales, rhonchi or wheezes  BREAST: normal without masses, tenderness or nipple discharge and no palpable axillary masses or adenopathy  CV: regular rate and rhythm, normal S1 S2, no S3 or S4, no murmur, click or rub, no peripheral edema and peripheral pulses strong  ABDOMEN: soft, nontender, no hepatosplenomegaly, no masses and bowel sounds normal POSITIVE for RLQ tenderness with palpation, no rebound tenderness   (female): normal female external genitalia, normal urethral " "meatus, vaginal mucosa pink, moist, well rugated, and normal cervix/adnexa/uterus without masses or discharge  RECTAL: normal sphincter tone  MS: no gross musculoskeletal defects noted, no edema  SKIN: no suspicious lesions or rashes  NEURO: Normal strength and tone, mentation intact and speech normal  PSYCH: mentation appears normal, affect normal/bright  LYMPH: no cervical, supraclavicular, axillary, or inguinal adenopathy    ASSESSMENT/PLAN:       ICD-10-CM    1. Screening for cervical cancer Z12.4 Pap imaged thin layer screen with HPV - recommended age 30 - 65 years (select HPV order below)   2. Screening for human papillomavirus Z11.51 HPV High Risk Types DNA Cervical   3. Vaginal discharge N89.8 Wet prep     US Pelvic Complete w Transvaginal   4. Screening for lipoid disorders Z13.220 Lipid panel reflex to direct LDL   5. Screening for diabetes mellitus Z13.1 Glucose whole blood   6. Screening for thyroid disorder Z13.29 TSH with free T4 reflex   7. Examination of eyes and vision Z01.00 OPTOMETRY REFERRAL   8. Right lower quadrant abdominal tenderness with rebound tenderness R10.823 US Pelvic Complete w Transvaginal    no rebound tenderness       COUNSELING:   Reviewed preventive health counseling, as reflected in patient instructions  IUD Oct of 2015    Call [phone] to schedule fasting labs, or complete them at your work.    Follow up for any health care questions or concerns.       reports that she has never smoked. She has never used smokeless tobacco.    Estimated body mass index is 27.56 kg/(m^2) as calculated from the following:    Height as of this encounter: 5' 3\" (1.6 m).    Weight as of this encounter: 155 lb 9.6 oz (70.6 kg).   Weight management plan: Discussed healthy diet and exercise guidelines and patient will follow up in 12 months in clinic to re-evaluate.    Counseling Resources:  ATP IV Guidelines  Pooled Cohorts Equation Calculator  Breast Cancer Risk Calculator  FRAX Risk " Assessment  ICSI Preventive Guidelines  Dietary Guidelines for Americans, 2010  USDA's MyPlate  ASA Prophylaxis  Lung CA Screening    PRIMO Graham  [facility] [place]

## 2017-09-22 NOTE — PROGRESS NOTES
Results discussed directly with patient while patient was present. Any further details documented in the note.   Ileana Esteves NP

## 2017-09-25 LAB
COPATH REPORT: NORMAL
PAP: NORMAL

## 2017-09-27 LAB
FINAL DIAGNOSIS: NORMAL
HPV HR 12 DNA CVX QL NAA+PROBE: NEGATIVE
HPV16 DNA SPEC QL NAA+PROBE: NEGATIVE
HPV18 DNA SPEC QL NAA+PROBE: NEGATIVE
SPECIMEN DESCRIPTION: NORMAL

## 2017-10-13 DIAGNOSIS — Z13.29 SCREENING FOR THYROID DISORDER: ICD-10-CM

## 2017-10-13 DIAGNOSIS — Z13.1 SCREENING FOR DIABETES MELLITUS: ICD-10-CM

## 2017-10-13 DIAGNOSIS — Z13.220 SCREENING FOR LIPOID DISORDERS: ICD-10-CM

## 2017-10-13 DIAGNOSIS — C64.2 RENAL CANCER, LEFT (H): ICD-10-CM

## 2017-10-13 LAB
ANION GAP SERPL CALCULATED.3IONS-SCNC: 6 MMOL/L (ref 3–14)
BASOPHILS # BLD AUTO: 0 10E9/L (ref 0–0.2)
BASOPHILS NFR BLD AUTO: 0.4 %
BUN SERPL-MCNC: 10 MG/DL (ref 7–30)
CALCIUM SERPL-MCNC: 8.4 MG/DL (ref 8.5–10.1)
CHLORIDE SERPL-SCNC: 102 MMOL/L (ref 94–109)
CHOLEST SERPL-MCNC: 145 MG/DL
CO2 SERPL-SCNC: 29 MMOL/L (ref 20–32)
CREAT SERPL-MCNC: 0.63 MG/DL (ref 0.52–1.04)
DIFFERENTIAL METHOD BLD: ABNORMAL
EOSINOPHIL # BLD AUTO: 0.1 10E9/L (ref 0–0.7)
EOSINOPHIL NFR BLD AUTO: 2.1 %
ERYTHROCYTE [DISTWIDTH] IN BLOOD BY AUTOMATED COUNT: 12.1 % (ref 10–15)
GFR SERPL CREATININE-BSD FRML MDRD: >90 ML/MIN/1.7M2
GLUCOSE BLD-MCNC: 99 MG/DL (ref 70–99)
GLUCOSE SERPL-MCNC: 87 MG/DL (ref 70–99)
HCT VFR BLD AUTO: 41.1 % (ref 35–47)
HDLC SERPL-MCNC: 43 MG/DL
HGB BLD-MCNC: 14.1 G/DL (ref 11.7–15.7)
LDLC SERPL CALC-MCNC: 90 MG/DL
LYMPHOCYTES # BLD AUTO: 1.1 10E9/L (ref 0.8–5.3)
LYMPHOCYTES NFR BLD AUTO: 47.5 %
MCH RBC QN AUTO: 31.4 PG (ref 26.5–33)
MCHC RBC AUTO-ENTMCNC: 34.3 G/DL (ref 31.5–36.5)
MCV RBC AUTO: 92 FL (ref 78–100)
MONOCYTES # BLD AUTO: 0.3 10E9/L (ref 0–1.3)
MONOCYTES NFR BLD AUTO: 11.3 %
NEUTROPHILS # BLD AUTO: 0.9 10E9/L (ref 1.6–8.3)
NEUTROPHILS NFR BLD AUTO: 38.7 %
NONHDLC SERPL-MCNC: 102 MG/DL
PLATELET # BLD AUTO: 183 10E9/L (ref 150–450)
POTASSIUM SERPL-SCNC: 4.4 MMOL/L (ref 3.4–5.3)
RBC # BLD AUTO: 4.49 10E12/L (ref 3.8–5.2)
SODIUM SERPL-SCNC: 137 MMOL/L (ref 133–144)
TRIGL SERPL-MCNC: 60 MG/DL
TSH SERPL DL<=0.005 MIU/L-ACNC: 1.98 MU/L (ref 0.4–4)
WBC # BLD AUTO: 2.4 10E9/L (ref 4–11)

## 2017-10-13 PROCEDURE — 82947 ASSAY GLUCOSE BLOOD QUANT: CPT | Performed by: NURSE PRACTITIONER

## 2017-10-13 PROCEDURE — 80048 BASIC METABOLIC PNL TOTAL CA: CPT | Performed by: UROLOGY

## 2017-10-13 PROCEDURE — 36415 COLL VENOUS BLD VENIPUNCTURE: CPT | Performed by: NURSE PRACTITIONER

## 2017-10-13 PROCEDURE — 80061 LIPID PANEL: CPT | Performed by: NURSE PRACTITIONER

## 2017-10-13 PROCEDURE — 84443 ASSAY THYROID STIM HORMONE: CPT | Performed by: NURSE PRACTITIONER

## 2017-10-13 PROCEDURE — 85025 COMPLETE CBC W/AUTO DIFF WBC: CPT | Performed by: UROLOGY

## 2017-10-16 NOTE — PROGRESS NOTES
Ashkan Swain,    Thank you for your recent office visit.    Here are your recent results.  Your labs look pretty good. Your WBC count is low, I would discus this with your oncologist.  Other than that, nothing to worry about.  Please let me know if you have any questions or concerns.     Feel free to contact me via [employer] or call the clinic at [phone].    Sincerely,    LÁZARO Kaiser, FNP-BC

## 2018-01-03 ENCOUNTER — TELEPHONE (OUTPATIENT)
Dept: FAMILY MEDICINE | Facility: CLINIC | Age: 38
End: 2018-01-03

## 2018-01-03 NOTE — TELEPHONE ENCOUNTER
Patient states would like the results of her blood tests from 10-13-17 mailed to her.    Thank you.

## 2018-01-03 NOTE — LETTER
January 3, 2018      Brynn Sprague  [address] [place] [address]        Dear ,    Ashkan Swain,    Thank you for your recent office visit.    Here are your recent results.  Your labs look pretty good. Your WBC count is low, I would discus this with your oncologist.  Other than that, nothing to worry about.  Please let me know if you have any questions or concerns.     Results for orders placed or performed in visit on 10/13/17   Lipid panel reflex to direct LDL   Result Value Ref Range    Cholesterol 145 <200 mg/dL    Triglycerides 60 <150 mg/dL    HDL Cholesterol 43 (L) >49 mg/dL    LDL Cholesterol Calculated 90 <100 mg/dL    Non HDL Cholesterol 102 <130 mg/dL   Glucose whole blood   Result Value Ref Range    Glucose Whole Blood 99 70 - 99 mg/dL   TSH with free T4 reflex   Result Value Ref Range    TSH 1.98 0.40 - 4.00 mU/L   Basic metabolic panel   Result Value Ref Range    Sodium 137 133 - 144 mmol/L    Potassium 4.4 3.4 - 5.3 mmol/L    Chloride 102 94 - 109 mmol/L    Carbon Dioxide 29 20 - 32 mmol/L    Anion Gap 6 3 - 14 mmol/L    Glucose 87 70 - 99 mg/dL    Urea Nitrogen 10 7 - 30 mg/dL    Creatinine 0.63 0.52 - 1.04 mg/dL    GFR Estimate >90 >60 mL/min/1.7m2    GFR Estimate If Black >90 >60 mL/min/1.7m2    Calcium 8.4 (L) 8.5 - 10.1 mg/dL   CBC with platelets differential   Result Value Ref Range    WBC 2.4 (L) 4.0 - 11.0 10e9/L    RBC Count 4.49 3.8 - 5.2 10e12/L    Hemoglobin 14.1 11.7 - 15.7 g/dL    Hematocrit 41.1 35.0 - 47.0 %    MCV 92 78 - 100 fl    MCH 31.4 26.5 - 33.0 pg    MCHC 34.3 31.5 - 36.5 g/dL    RDW 12.1 10.0 - 15.0 %    Platelet Count 183 150 - 450 10e9/L    Diff Method Automated Method     % Neutrophils 38.7 %    % Lymphocytes 47.5 %    % Monocytes 11.3 %    % Eosinophils 2.1 %    % Basophils 0.4 %    Absolute Neutrophil 0.9 (L) 1.6 - 8.3 10e9/L    Absolute Lymphocytes 1.1 0.8 - 5.3 10e9/L    Absolute Monocytes 0.3 0.0 - 1.3 10e9/L    Absolute Eosinophils 0.1 0.0 - 0.7 10e9/L     Absolute Basophils 0.0 0.0 - 0.2 10e9/L       If you have any questions or concerns, please call the clinic at the number listed above.       Sincerely,    Ileana Esteves NP/oralia

## 2018-01-08 ENCOUNTER — PRE VISIT (OUTPATIENT)
Dept: UROLOGY | Facility: CLINIC | Age: 38
End: 2018-01-08

## 2018-01-10 DIAGNOSIS — C64.2 MALIGNANT NEOPLASM OF LEFT KIDNEY (H): Primary | ICD-10-CM

## 2018-01-11 ENCOUNTER — RADIANT APPOINTMENT (OUTPATIENT)
Dept: CT IMAGING | Facility: CLINIC | Age: 38
End: 2018-01-11
Attending: UROLOGY
Payer: COMMERCIAL

## 2018-01-11 ENCOUNTER — OFFICE VISIT (OUTPATIENT)
Dept: UROLOGY | Facility: CLINIC | Age: 38
End: 2018-01-11
Payer: COMMERCIAL

## 2018-01-11 ENCOUNTER — RADIANT APPOINTMENT (OUTPATIENT)
Dept: GENERAL RADIOLOGY | Facility: CLINIC | Age: 38
End: 2018-01-11
Attending: UROLOGY
Payer: COMMERCIAL

## 2018-01-11 VITALS
HEIGHT: 63 IN | DIASTOLIC BLOOD PRESSURE: 67 MMHG | WEIGHT: 158.2 LBS | SYSTOLIC BLOOD PRESSURE: 110 MMHG | BODY MASS INDEX: 28.03 KG/M2 | HEART RATE: 75 BPM

## 2018-01-11 DIAGNOSIS — C64.2 RENAL CANCER, LEFT (H): ICD-10-CM

## 2018-01-11 DIAGNOSIS — C64.2 MALIGNANT NEOPLASM OF LEFT KIDNEY (H): ICD-10-CM

## 2018-01-11 DIAGNOSIS — C64.1 RENAL CELL CARCINOMA OF RIGHT KIDNEY (H): Primary | ICD-10-CM

## 2018-01-11 LAB
ANION GAP SERPL CALCULATED.3IONS-SCNC: 4 MMOL/L (ref 3–14)
BASOPHILS # BLD AUTO: 0 10E9/L (ref 0–0.2)
BASOPHILS NFR BLD AUTO: 0.7 %
BUN SERPL-MCNC: 7 MG/DL (ref 7–30)
CALCIUM SERPL-MCNC: 8.5 MG/DL (ref 8.5–10.1)
CHLORIDE SERPL-SCNC: 105 MMOL/L (ref 94–109)
CO2 SERPL-SCNC: 29 MMOL/L (ref 20–32)
CREAT BLD-MCNC: 0.6 MG/DL (ref 0.52–1.04)
CREAT SERPL-MCNC: 0.59 MG/DL (ref 0.52–1.04)
DIFFERENTIAL METHOD BLD: ABNORMAL
EOSINOPHIL # BLD AUTO: 0.2 10E9/L (ref 0–0.7)
EOSINOPHIL NFR BLD AUTO: 5 %
ERYTHROCYTE [DISTWIDTH] IN BLOOD BY AUTOMATED COUNT: 12.5 % (ref 10–15)
GFR SERPL CREATININE-BSD FRML MDRD: >90 ML/MIN/1.7M2
GFR SERPL CREATININE-BSD FRML MDRD: >90 ML/MIN/1.7M2
GLUCOSE SERPL-MCNC: 87 MG/DL (ref 70–99)
HCT VFR BLD AUTO: 43.2 % (ref 35–47)
HGB BLD-MCNC: 14.7 G/DL (ref 11.7–15.7)
IMM GRANULOCYTES # BLD: 0 10E9/L (ref 0–0.4)
IMM GRANULOCYTES NFR BLD: 0.3 %
LYMPHOCYTES # BLD AUTO: 0.9 10E9/L (ref 0.8–5.3)
LYMPHOCYTES NFR BLD AUTO: 28.3 %
MCH RBC QN AUTO: 31.5 PG (ref 26.5–33)
MCHC RBC AUTO-ENTMCNC: 34 G/DL (ref 31.5–36.5)
MCV RBC AUTO: 93 FL (ref 78–100)
MONOCYTES # BLD AUTO: 0.3 10E9/L (ref 0–1.3)
MONOCYTES NFR BLD AUTO: 11.3 %
NEUTROPHILS # BLD AUTO: 1.6 10E9/L (ref 1.6–8.3)
NEUTROPHILS NFR BLD AUTO: 54.4 %
NRBC # BLD AUTO: 0 10*3/UL
NRBC BLD AUTO-RTO: 0 /100
PLATELET # BLD AUTO: 171 10E9/L (ref 150–450)
POTASSIUM SERPL-SCNC: 4 MMOL/L (ref 3.4–5.3)
RBC # BLD AUTO: 4.66 10E12/L (ref 3.8–5.2)
SODIUM SERPL-SCNC: 138 MMOL/L (ref 133–144)
WBC # BLD AUTO: 3 10E9/L (ref 4–11)

## 2018-01-11 RX ORDER — IBUPROFEN 600 MG/1
600 TABLET, FILM COATED ORAL
COMMUNITY
Start: 2010-11-16 | End: 2018-09-24

## 2018-01-11 RX ORDER — IOPAMIDOL 755 MG/ML
95 INJECTION, SOLUTION INTRAVASCULAR ONCE
Status: COMPLETED | OUTPATIENT
Start: 2018-01-11 | End: 2018-01-11

## 2018-01-11 RX ADMIN — IOPAMIDOL 95 ML: 755 INJECTION, SOLUTION INTRAVASCULAR at 09:18

## 2018-01-11 ASSESSMENT — PAIN SCALES - GENERAL: PAINLEVEL: NO PAIN (0)

## 2018-01-11 NOTE — LETTER
"1/11/2018       RE: Brynn Sprague  [address]  [place] [address]     Dear Colleague,    Thank you for referring your patient, Brynn Sprague, to the [facility] UROLOGY AND INST FOR PROSTATE AND UROLOGIC CANCERS at [facility]. Please see a copy of my visit note below.    Kidney Cancer Follow up     Brynn Sprague is a very pleasant 37 year old female who presents with a history of a Renal Cell Cancer.    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    ISUP Grade: na  Pathologic Stage T2a see below*.    Maximal tumor dimension was 8.7 cm.    Tumor thrombus level  not applicable.    Tumor necrosis present: No  Sarcomatoid features present: No  Patient is status post Robotic Partial Nephrectomy on 1/16/2017.   Tumor was on the left side.     Today she is doing well. She states she had some bilateral flank pain approximately one month ago, but notes that it has resolved since then.     She reports an intermittent itching sensation around the incision site. She has not tried using any lotion or cream for this. She has noted this sensation even prior to surgery and states she continues to experience it, as recently as last week.     There is no evidence of disease recurrence to date.    Physical Exam  Vitals: /67  Pulse 75  Ht 1.6 m (5' 3\")  Wt 71.8 kg (158 lb 3.2 oz)  BMI 28.02 kg/m2   General Appearance Adult: A&O in NAD     Abd is soft, non-distended, incisions are healing well, no hernia  An area on the abd that itches even before surgery    Recent Kidney Function    Lab Results   Component Value Date    CR 0.54 09/07/2017    CR 0.51 06/01/2017    CR 0.61 01/19/2017    CR 0.57 01/18/2017    CR 0.55 01/17/2017    CR 0.54 01/16/2017    CR 0.58 12/16/2016    CR 0.57 03/19/2012       Hemoglobin   Date Value Ref Range Status   01/11/2018 14.7 11.7 - 15.7 g/dL Final       Chest imaging demonstrates no evidence of recurrence  Abdominal imaging demonstrates no evidence of " "recurrence    Assessment/Plan:     Hx of Renal Cell Cancer [id number], chromophobe, s/p Robotic Partial Nephrectomy   Follow up in 4 months with:    CT of Abd with and without IV contrast, no oral contrast needed    Chest CT scan    BMP, CBC  Suggested using lotion and Vitamin E oil around incision sites for itching sensation.       Scribe Disclosure:   I,Todd Santiago, am serving as a scribe; to document services personally performed by Jairo Cross MD based on data collection and the provider's statements to me.     Attestation:  This patient was seen and evaluated by me, with a scribe taking notes.  I have reviewed the note above and agree.  The physical exam was performed by me and the pertinant details are outlined below.     Patient is a 36 year old  female   Vitals: Blood pressure 110/67, pulse 75, height 1.6 m (5' 3\"), weight 71.8 kg (158 lb 3.2 oz), not currently breastfeeding.  General Appearance Adult: Alert, no acute distress, oriented      Assessment/Plan:     Hx of Renal Cell Cancer [id number], chromophobe, s/p Robotic Partial Nephrectomy   Follow up in 4 months with:     CT of Abd with and without IV contrast, no oral contrast needed    Chest CT scan    BMP, CBC    *AJCC/pTNM staging 2010 where   T1a (<=4cm), T1b  (4-7cm)  T2a (7-10 cm), T2b (>10 cm)   T3a (renal vein thrombus, perirenal or renal sinus fat invasion)   T3b (tumor thrombus to IVC, below diaphragm)  T3c (tumor thrombus to IVC, above diaphragm)   T4 (tumor invades beyond Gerota's fascia)  Nx regional nodes not assessed  N0 No lymph node metastasis  N1 Lymph node metastasis  M0 No distant Mets  M1 Distant Mestastasis      Jairo Cross MD  Department of Urology  [facility]          "

## 2018-01-11 NOTE — MR AVS SNAPSHOT
After Visit Summary   1/11/2018    Brynn Sprague    MRN: [id number]           Patient Information     Date Of Birth          1980        Visit Information        Provider Department      1/11/2018 10:00 AM Jairo Cross MD [facility] Urology and [facility] for Prostate and Urologic Cancers        Today's Diagnoses     Renal cell carcinoma of right kidney (H)    -  1      Care Instructions    Follow up with Dr. Cross in 4 months with blood work and imaging prior.    It was a pleasure meeting with you today.  Thank you for allowing me and my team the privilege of caring for you today.  YOU are the reason we are here, and I truly hope we provided you with the excellent service you deserve.  Please let us know if there is anything else we can do for you so that we can be sure you are leaving completely satisfied with your care experience.      Dorcas Reyes RITA          Follow-ups after your visit        Your next 10 appointments already scheduled     May 31, 2018  9:00 AM CDT   LAB with  LAB   [facility])    [address] [place] [address] [phone]           Please do not eat 10-12 hours before your appointment if you are coming in fasting for labs on lipids, cholesterol, or glucose (sugar). This does not apply to pregnant women. Water, hot tea and black coffee (with nothing added) are okay. Do not drink other fluids, diet soda or chew gum.            May 31, 2018  9:15 AM CDT   (Arrive by 9:00 AM)   XR CHEST 2 VIEWS with UCXR1   [facility] X[facility] ([facility])    [address] [place] [address] [phone]           Please bring a list of your current medicines to your exam. (Include vitamins, minerals and over-thecounter medicines.) Leave your valuables at home.  Tell your doctor if there is a chance you may be pregnant.  You do not need to do  anything special for this exam.            May 31, 2018  9:40 AM CDT   (Arrive by 9:25 AM)   CT ABDOMEN W/O & W CONTRAST with UCCT2   [facility] CT ([facility])    [address] [address] [phone]           Please bring any scans or X-rays taken at other hospitals, if similar tests were done. Also bring a list of your medicines, including vitamins, minerals and over-the-counter drugs. It is safest to leave personal items at home.  Be sure to tell your doctor:   If you have any allergies.   If there s any chance you are pregnant.   If you are breastfeeding.   If you have any special needs.  You may have contrast for this exam. To prepare:   Do not eat or drink for 2 hours before your exam. If you need to take medicine, you may take it with small sips of water. (We may ask you to take liquid medicine as well.)   The day before your exam, drink extra fluids at least six 8-ounce glasses (unless your doctor tells you to restrict your fluids).  Patients over 70 or patients with diabetes or kidney problems:   If you haven t had a blood test (creatinine test) within the last 30 days, go to your clinic or Diagnostic Imaging Department for this test.  If you have diabetes:   If your kidney function is normal, continue taking your metformin (Avandamet, Glucophage, Glucovance, Metaglip) on the day of your exam.   If your kidney function is abnormal, wait 48 hours before restarting this medicine.  You will have oral contrast for this exam:   You will drink the contrast at home. Get this from your clinic or Diagnostic Imaging Department. Please follow the directions given.  Please wear loose clothing, such as a sweat suit or jogging clothes. Avoid snaps, zippers and other metal. We may ask you to undress and put on a hospital gown.  If you have any questions, please call the Imaging Department where you will have your exam.            May 31, 2018  10:00 AM CDT   (Arrive by 9:45 AM)   Return Visit with Jairo Cross MD   [facility] Urology and [facility] for Prostate and Urologic Cancers ([facility])    [address] [address] [phone]              Future tests that were ordered for you today     Open Future Orders        Priority Expected Expires Ordered    CT Abdomen w/o & w Contrast Routine  2019    Basic metabolic panel Routine  2019    CBC with platelets differential Routine  2019    XR Chest 2 Views Routine  2019            Who to contact     Please call your clinic at [phone] to:    Ask questions about your health    Make or cancel appointments    Discuss your medicines    Learn about your test results    Speak to your doctor   If you have compliments or concerns about an experience at your clinic, or if you wish to file a complaint, please contact [facility] Patient Relations at [phone] or email us at [email or web address]         Additional Information About Your Visit        [employer] Information     [employer] is an electronic gateway that provides easy, online access to your medical records. With [employer], you can request a clinic appointment, read your test results, renew a prescription or communicate with your care team.     To sign up for [employer] visit the website at [email or web address]   You will be asked to enter the access code listed below, as well as some personal information. Please follow the directions to create your username and password.     Your access code is: [email or web address]  Expires: 3/28/2018  6:31 AM     Your access code will  in 90 days. If you need help or a new code, please contact your [facility] Clinic or call [phone] for assistance.        Care EveryWhere ID     This is your Care EveryWhere ID. This could be used by  "other organizations to access your [facility] medical records  [id number]        Your Vitals Were     Pulse Height BMI (Body Mass Index)             75 1.6 m (5' 3\") 28.02 kg/m2          Blood Pressure from Last 3 Encounters:   01/11/18 110/67   09/21/17 102/68   09/07/17 119/73    Weight from Last 3 Encounters:   01/11/18 71.8 kg (158 lb 3.2 oz)   09/21/17 70.6 kg (155 lb 9.6 oz)   09/07/17 70.7 kg (155 lb 12.8 oz)               Primary Care Provider Office Phone # Fax #    Dallas Parra PA-C [phone]       [address] [place] [address]        Equal Access to Services     [facility]: Hadii aad lauro hadasho Soomaali, waaxda luqadaha, qaybta kaalmada adeegyada, waxyin morris haykennedy neumann . So [facility] [phone].    ATENCIÓN: Si habla español, tiene a olvera disposición servicios gratuitos de asistencia lingüística. Sandro al [phone].    We comply with applicable federal civil rights laws and [place] laws. We do not discriminate on the basis of race, color, national origin, age, disability, sex, sexual orientation, or gender identity.            Thank you!     Thank you for choosing [facility] UROLOGY AND [facility] FOR PROSTATE AND UROLOGIC CANCERS  for your care. Our goal is always to provide you with excellent care. Hearing back from our patients is one way we can continue to improve our services. Please take a few minutes to complete the written survey that you may receive in the mail after your visit with us. Thank you!             Your Updated Medication List - Protect others around you: Learn how to safely use, store and throw away your medicines at www.disposemymeds.org.          This list is accurate as of: 1/11/18 10:51 AM.  Always use your most recent med list.                   Brand Name Dispense Instructions for use Diagnosis    ibuprofen 600 MG tablet    ADVIL/MOTRIN     Take 600 mg by mouth        levonorgestrel 20 MCG/24HR IUD    MIRENA     1 each by Intrauterine " route once

## 2018-01-11 NOTE — DISCHARGE INSTRUCTIONS

## 2018-01-11 NOTE — PATIENT INSTRUCTIONS
Follow up with Dr. Cross in 4 months with blood work and imaging prior.    It was a pleasure meeting with you today.  Thank you for allowing me and my team the privilege of caring for you today.  YOU are the reason we are here, and I truly hope we provided you with the excellent service you deserve.  Please let us know if there is anything else we can do for you so that we can be sure you are leaving completely satisfied with your care experience.      GABBY Levy

## 2018-01-11 NOTE — PROGRESS NOTES
"Kidney Cancer Follow up     Brynn Sprague is a very pleasant 37 year old female who presents with a history of a Renal Cell Cancer.    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    ISUP Grade: na  Pathologic Stage T2a see below*.    Maximal tumor dimension was 8.7 cm.    Tumor thrombus level  not applicable.    Tumor necrosis present: No  Sarcomatoid features present: No  Patient is status post Robotic Partial Nephrectomy on 1/16/2017.   Tumor was on the left side.     Today she is doing well. She states she had some bilateral flank pain approximately one month ago, but notes that it has resolved since then.     She reports an intermittent itching sensation around the incision site. She has not tried using any lotion or cream for this. She has noted this sensation even prior to surgery and states she continues to experience it, as recently as last week.     There is no evidence of disease recurrence to date.    Physical Exam  Vitals: /67  Pulse 75  Ht 1.6 m (5' 3\")  Wt 71.8 kg (158 lb 3.2 oz)  BMI 28.02 kg/m2   General Appearance Adult: A&O in NAD     Abd is soft, non-distended, incisions are healing well, no hernia  An area on the abd that itches even before surgery    Recent Kidney Function    Lab Results   Component Value Date    CR 0.54 09/07/2017    CR 0.51 06/01/2017    CR 0.61 01/19/2017    CR 0.57 01/18/2017    CR 0.55 01/17/2017    CR 0.54 01/16/2017    CR 0.58 12/16/2016    CR 0.57 03/19/2012       Hemoglobin   Date Value Ref Range Status   01/11/2018 14.7 11.7 - 15.7 g/dL Final       Chest imaging demonstrates no evidence of recurrence  Abdominal imaging demonstrates no evidence of recurrence    Assessment/Plan:     Hx of Renal Cell Cancer [id number], chromophobe, s/p Robotic Partial Nephrectomy   Follow up in 4 months with:    CT of Abd with and without IV contrast, no oral contrast needed    Chest CT scan    BMP, CBC  Suggested using lotion and Vitamin E oil around incision sites for itching " "sensation.       Scribe Disclosure:   I,Todd Granadosaji, am serving as a scribe; to document services personally performed by Jairo Cross MD based on data collection and the provider's statements to me.     Jairo Cross MD  Department of Urology  [facility]    Attestation:  This patient was seen and evaluated by me, with a scribe taking notes.  I have reviewed the note above and agree.  The physical exam was performed by me and the pertinant details are outlined below.     Patient is a 36 year old  female   Vitals: Blood pressure 110/67, pulse 75, height 1.6 m (5' 3\"), weight 71.8 kg (158 lb 3.2 oz), not currently breastfeeding.  General Appearance Adult: Alert, no acute distress, oriented      Assessment/Plan:     Hx of Renal Cell Cancer [id number], chromophobe, s/p Robotic Partial Nephrectomy   Follow up in 4 months with:     CT of Abd with and without IV contrast, no oral contrast needed    Chest CT scan    BMP, CBC    Jairo Cross MD  Department of Urology  [facility]        *AJCC/pTNM staging 2010 where   T1a (<=4cm), T1b  (4-7cm)  T2a (7-10 cm), T2b (>10 cm)   T3a (renal vein thrombus, perirenal or renal sinus fat invasion)   T3b (tumor thrombus to IVC, below diaphragm)  T3c (tumor thrombus to IVC, above diaphragm)   T4 (tumor invades beyond Gerota's fascia)  Nx regional nodes not assessed  N0 No lymph node metastasis  N1 Lymph node metastasis  M0 No distant Mets  M1 Distant Mestastasis    "

## 2018-01-11 NOTE — LETTER
January 18, 2018       TO: Brynn Sprague  [address] [place] [address]       DearMs.Darcie,    We are writing to inform you of your test results.    Your test results fall within the expected range(s) or remain unchanged from previous results.  Please continue with current treatment plan.    Resulted Orders   CT Abdomen Pelvis w Contrast    Narrative    Exam: CT abdomen pelvis with contrast 1/11/2018 9:30 AM.    History: Renal cancer left.    Comparison: CT 9/7/2017, 11/23/2016.    Technique: CT images from the lung bases through the symphysis pubis  after the uneventful administration of IV contrast, biphasic protocol     Findings:   Stable postsurgical changes of left upper pole partial nephrectomy. No  evidence for recurrent disease. The kidneys are otherwise within  normal limits.  Unchanged subcentimeter hypodense lesion in the right lobe of the  liver to small to characterize. There is an area of persistent  hyperdensity peripheral to this lesion, seen on the prior examination,  likely an adjacent vessel. Otherwise the liver, gallbladder, pancreas,  spleen, kidneys, and adrenal glands are unremarkable.     No abnormally dilated loops of small bowel or colon. No free air or  free fluid. No abdominal or pelvic lymphadenopathy. IUD in position.  Heterogenous appearance of the uterus with fibroids. Unchanged  prominence of pelvic vasculature. Left ovarian cyst measuring 45 mm.  Increased in size, although dedicated follow-up is considered  necessary in premenopausal patients. Small hiatal hernia. Dermal  nodule incidentally noted overlying the left hip, likely of no  clinical significance.    Mild bibasilar atelectasis. No suspicious bony lesions. Right iliac  bone island.      Impression    Impression:   1. Postsurgical changes of left upper pole partial nephrectomy without  evidence for recurrent disease.  2. No evidence for metastatic disease in the abdomen and pelvis.    I have personally reviewed  the examination and initial interpretation  and I agree with the findings.    DIPIKA BENTLEY MD       Thank you for choosing [facility] for your care. Please follow up as previously planned.  Please call with any questions or concerns.    Thank you,  Loan Maharaj RN Care Coordinator for  Dr. Jairo Cross

## 2018-01-11 NOTE — NURSING NOTE
Chief Complaint   Patient presents with     RECHECK     Follow up- [facility] with labs/imaging prior.       Fiona Horne

## 2018-05-22 ENCOUNTER — PRE VISIT (OUTPATIENT)
Dept: UROLOGY | Facility: CLINIC | Age: 38
End: 2018-05-22

## 2018-09-24 ENCOUNTER — RADIANT APPOINTMENT (OUTPATIENT)
Dept: GENERAL RADIOLOGY | Facility: CLINIC | Age: 38
End: 2018-09-24
Attending: PHYSICIAN ASSISTANT
Payer: COMMERCIAL

## 2018-09-24 ENCOUNTER — OFFICE VISIT (OUTPATIENT)
Dept: FAMILY MEDICINE | Facility: CLINIC | Age: 38
End: 2018-09-24

## 2018-09-24 VITALS
DIASTOLIC BLOOD PRESSURE: 73 MMHG | OXYGEN SATURATION: 99 % | SYSTOLIC BLOOD PRESSURE: 109 MMHG | TEMPERATURE: 98.6 F | BODY MASS INDEX: 27.64 KG/M2 | HEART RATE: 83 BPM | HEIGHT: 63 IN | RESPIRATION RATE: 16 BRPM | WEIGHT: 156 LBS

## 2018-09-24 DIAGNOSIS — S99.911A ANKLE INJURY, RIGHT, INITIAL ENCOUNTER: Primary | ICD-10-CM

## 2018-09-24 DIAGNOSIS — S99.911A ANKLE INJURY, RIGHT, INITIAL ENCOUNTER: ICD-10-CM

## 2018-09-24 PROCEDURE — 99213 OFFICE O/P EST LOW 20 MIN: CPT | Performed by: PHYSICIAN ASSISTANT

## 2018-09-24 PROCEDURE — 73610 X-RAY EXAM OF ANKLE: CPT | Mod: RT

## 2018-09-24 RX ORDER — DICLOFENAC SODIUM 75 MG/1
75 TABLET, DELAYED RELEASE ORAL 2 TIMES DAILY PRN
Qty: 30 TABLET | Refills: 1 | Status: SHIPPED | OUTPATIENT
Start: 2018-09-24 | End: 2018-10-26

## 2018-09-24 NOTE — PROGRESS NOTES
SUBJECTIVE:   Brynn Sprague is a 37 year old female who presents to clinic today for the following health issues:      Work comp-fell at work 1 week ago. Noted swelling and pain left ankle. Some improvement with pain with rest.        Problem list and histories reviewed & adjusted, as indicated.  Additional history: as documented        Reviewed and updated as needed this visit by clinical staff  Tobacco  Allergies  Meds       Reviewed and updated as needed this visit by Provider         All other systems negative except as outline above  OBJECTIVE:  Exam of the injured ankle reveals swelling and tenderness over the lateral malleolus. No tenderness over the medial aspect of the ankle. The fifth metatarsal is not tender. The ankle joint is intact without excessive opening on stressing. X-Ray shows fracture to be absent. The rest of the foot, ankle and leg exam is normal.    Brynn was seen today for musculoskeletal problem.    Diagnoses and all orders for this visit:    Ankle injury, right, initial encounter  -     XR Ankle Right G/E 3 Views; Future  -     diclofenac (VOLTAREN) 75 MG EC tablet; Take 1 tablet (75 mg) by mouth 2 times daily as needed for moderate pain  -     order for DME; Equipment being ordered: ankle brace      Advised supportive and symptomatic treatment.  Follow up with Provider - if condition persists or worsens.

## 2018-09-24 NOTE — MR AVS SNAPSHOT
"              After Visit Summary   9/24/2018    Brynn Sprague    MRN: [id number]           Patient Information     Date Of Birth          1980        Visit Information        Provider Department      9/24/2018 4:20 PM Dallas Parra PA-C [facility]        Today's Diagnoses     Ankle injury, right, initial encounter    -  1       Follow-ups after your visit        Your next 10 appointments already scheduled     Oct 18, 2018  8:00 AM CDT   (Arrive by 7:45 AM)   Return Visit with Jairo Cross MD   [facility] and [facility] Prostate and Urologic Cancers ([facility])    [address]  [address] [facility] [address] [phone]              Who to contact     Normal or non-critical lab and imaging results will be communicated to you by MyChart, letter or phone within 4 business days after the clinic has received the results. If you do not hear from us within 7 days, please contact the clinic through MyChart or phone. If you have a critical or abnormal lab result, we will notify you by phone as soon as possible.  Submit refill requests through [email or web address] or call your pharmacy and they will forward the refill request to us. Please allow 3 business days for your refill to be completed.          If you need to speak with a  for additional information , please call: [phone]             Additional Information About Your Visit        Care EveryWhere ID     This is your Care EveryWhere ID. This could be used by other organizations to access your [facility] medical records  [id number]        Your Vitals Were     Pulse Temperature Respirations Height Pulse Oximetry BMI (Body Mass Index)    83 98.6  F (37  C) (Tympanic) 16 5' 3\" (1.6 m) 99% 27.63 kg/m2       Blood Pressure from Last 3 Encounters:   09/24/18 109/73   01/11/18 110/67   09/21/17 102/68    Weight from Last 3 Encounters:   09/24/18 156 lb (70.8 kg)   01/11/18 158 lb 3.2 " oz (71.8 kg)   09/21/17 155 lb 9.6 oz (70.6 kg)                 Today's Medication Changes          These changes are accurate as of 9/24/18  5:08 PM.  If you have any questions, ask your nurse or doctor.               Start taking these medicines.        Dose/Directions    diclofenac 75 MG EC tablet   Commonly known as:  VOLTAREN   Used for:  Ankle injury, right, initial encounter   Started by:  Dallas Parra PA-C        Dose:  75 mg   Take 1 tablet (75 mg) by mouth 2 times daily as needed for moderate pain   Quantity:  30 tablet   Refills:  1       order for DME   Used for:  Ankle injury, right, initial encounter   Started by:  Dallas Parra PA-C        Equipment being ordered: ankle brace   Quantity:  1 Device   Refills:  0         Stop taking these medicines if you haven't already. Please contact your care team if you have questions.     ibuprofen 600 MG tablet   Commonly known as:  ADVIL/MOTRIN   Stopped by:  Dallas Parra PA-C                Where to get your medicines      These medications were sent to [facility] - [address] [facility]  [address] [place] [address]     Phone:  [phone]     diclofenac 75 MG EC tablet         Some of these will need a paper prescription and others can be bought over the counter.  Ask your nurse if you have questions.     Bring a paper prescription for each of these medications     order for DME                Primary Care Provider Office Phone # Fax #    Dallas Parra PA-C [phone] [address]  [place] [address]        Equal Access to Services     KATELYNN AMAYA AH: Hadii jose ku hadasho Soomaali, waaxda luqadaha, qaybta kaalmada adeegyada, graciela vargas. So [facility] [phone].    ATENCIÓN: Si habla español, tiene a olvera disposición servicios gratuitos de asistencia lingüística. Llame al [phone].    We comply with applicable federal civil rights laws and  [place] laws. We do not discriminate on the basis of race, color, national origin, age, disability, sex, sexual orientation, or gender identity.            Thank you!     Thank you for choosing [facility]  for your care. Our goal is always to provide you with excellent care. Hearing back from our patients is one way we can continue to improve our services. Please take a few minutes to complete the written survey that you may receive in the mail after your visit with us. Thank you!             Your Updated Medication List - Protect others around you: Learn how to safely use, store and throw away your medicines at www.disposemymeds.org.          This list is accurate as of 9/24/18  5:08 PM.  Always use your most recent med list.                   Brand Name Dispense Instructions for use Diagnosis    diclofenac 75 MG EC tablet    VOLTAREN    30 tablet    Take 1 tablet (75 mg) by mouth 2 times daily as needed for moderate pain    Ankle injury, right, initial encounter       levonorgestrel 20 MCG/24HR IUD    MIRENA     1 each by Intrauterine route once        order for DME     1 Device    Equipment being ordered: ankle brace    Ankle injury, right, initial encounter

## 2018-10-15 ENCOUNTER — PRE VISIT (OUTPATIENT)
Dept: UROLOGY | Facility: CLINIC | Age: 38
End: 2018-10-15

## 2018-10-15 NOTE — TELEPHONE ENCOUNTER
Chief Complaint   Patient presents with     Previsit     message left for patient with Lab,imaging and follow up with Dr. Cross in 10/18/2018       Romero Poon MA

## 2018-10-18 ENCOUNTER — RADIANT APPOINTMENT (OUTPATIENT)
Dept: GENERAL RADIOLOGY | Facility: CLINIC | Age: 38
End: 2018-10-18
Attending: UROLOGY
Payer: COMMERCIAL

## 2018-10-18 ENCOUNTER — RADIANT APPOINTMENT (OUTPATIENT)
Dept: CT IMAGING | Facility: CLINIC | Age: 38
End: 2018-10-18
Attending: UROLOGY
Payer: COMMERCIAL

## 2018-10-18 ENCOUNTER — OFFICE VISIT (OUTPATIENT)
Dept: UROLOGY | Facility: CLINIC | Age: 38
End: 2018-10-18
Payer: COMMERCIAL

## 2018-10-18 VITALS
SYSTOLIC BLOOD PRESSURE: 102 MMHG | BODY MASS INDEX: 27.64 KG/M2 | HEART RATE: 72 BPM | WEIGHT: 156 LBS | HEIGHT: 63 IN | DIASTOLIC BLOOD PRESSURE: 69 MMHG

## 2018-10-18 DIAGNOSIS — C64.1 RENAL CELL CARCINOMA OF RIGHT KIDNEY (H): ICD-10-CM

## 2018-10-18 DIAGNOSIS — C64.1 MALIGNANT NEOPLASM OF RIGHT KIDNEY (H): Primary | ICD-10-CM

## 2018-10-18 LAB
ANION GAP SERPL CALCULATED.3IONS-SCNC: 4 MMOL/L (ref 3–14)
BASOPHILS # BLD AUTO: 0 10E9/L (ref 0–0.2)
BASOPHILS NFR BLD AUTO: 0.9 %
BUN SERPL-MCNC: 7 MG/DL (ref 7–30)
CALCIUM SERPL-MCNC: 7.6 MG/DL (ref 8.5–10.1)
CHLORIDE SERPL-SCNC: 104 MMOL/L (ref 94–109)
CO2 SERPL-SCNC: 28 MMOL/L (ref 20–32)
CREAT SERPL-MCNC: 0.64 MG/DL (ref 0.52–1.04)
DIFFERENTIAL METHOD BLD: ABNORMAL
EOSINOPHIL # BLD AUTO: 0.1 10E9/L (ref 0–0.7)
EOSINOPHIL NFR BLD AUTO: 2.7 %
ERYTHROCYTE [DISTWIDTH] IN BLOOD BY AUTOMATED COUNT: 12.2 % (ref 10–15)
GFR SERPL CREATININE-BSD FRML MDRD: >90 ML/MIN/1.7M2
GLUCOSE SERPL-MCNC: 84 MG/DL (ref 70–99)
HCT VFR BLD AUTO: 37.5 % (ref 35–47)
HGB BLD-MCNC: 12.7 G/DL (ref 11.7–15.7)
IMM GRANULOCYTES # BLD: 0 10E9/L (ref 0–0.4)
IMM GRANULOCYTES NFR BLD: 0.4 %
LYMPHOCYTES # BLD AUTO: 0.9 10E9/L (ref 0.8–5.3)
LYMPHOCYTES NFR BLD AUTO: 39.3 %
MCH RBC QN AUTO: 31.5 PG (ref 26.5–33)
MCHC RBC AUTO-ENTMCNC: 33.9 G/DL (ref 31.5–36.5)
MCV RBC AUTO: 93 FL (ref 78–100)
MONOCYTES # BLD AUTO: 0.2 10E9/L (ref 0–1.3)
MONOCYTES NFR BLD AUTO: 10.3 %
NEUTROPHILS # BLD AUTO: 1 10E9/L (ref 1.6–8.3)
NEUTROPHILS NFR BLD AUTO: 46.4 %
NRBC # BLD AUTO: 0 10*3/UL
NRBC BLD AUTO-RTO: 0 /100
PLATELET # BLD AUTO: 167 10E9/L (ref 150–450)
POTASSIUM SERPL-SCNC: 3.5 MMOL/L (ref 3.4–5.3)
RBC # BLD AUTO: 4.03 10E12/L (ref 3.8–5.2)
SODIUM SERPL-SCNC: 136 MMOL/L (ref 133–144)
WBC # BLD AUTO: 2.2 10E9/L (ref 4–11)

## 2018-10-18 RX ORDER — IOPAMIDOL 755 MG/ML
96 INJECTION, SOLUTION INTRAVASCULAR ONCE
Status: COMPLETED | OUTPATIENT
Start: 2018-10-18 | End: 2018-10-18

## 2018-10-18 RX ORDER — HYDROCORTISONE ACETATE 25 MG/1
25 SUPPOSITORY RECTAL
COMMUNITY
Start: 2018-07-23 | End: 2019-05-06

## 2018-10-18 RX ADMIN — IOPAMIDOL 96 ML: 755 INJECTION, SOLUTION INTRAVASCULAR at 07:18

## 2018-10-18 ASSESSMENT — PAIN SCALES - GENERAL: PAINLEVEL: NO PAIN (0)

## 2018-10-18 NOTE — PROGRESS NOTES
"Kidney Cancer Follow Up     Brynn Sprague is a very pleasant 37 year old female who presents with a history of a Renal Cell Cancer.    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    ISUP Grade: na  Pathologic Stage T2a see below*.    Maximal tumor dimension was 8.7 cm.    Tumor thrombus level  not applicable.    Tumor necrosis present: No  Sarcomatoid features present: No  Patient is status post Robotic Partial Nephrectomy on 1/16/2017.   Tumor was on the left side.     Today she is doing well. She reports one episode of left-sided flank pain last week, believes this may be musculoskeletal in nature. No evidence of hydronephrosis or recurrence on scans today.     There is no evidence of disease recurrence to date.    Physical Exam  Vitals: /69  Pulse 72  Ht 1.6 m (5' 3\")  Wt 70.8 kg (156 lb)  BMI 27.63 kg/m2   General Appearance Adult: A&O in NAD   Abd is soft, non-distended, incisions are well-healed, no hernia      Recent Kidney Function    Lab Results   Component Value Date    CR 0.64 10/18/2018    CR 0.59 01/11/2018    CR 0.63 10/13/2017    CR 0.54 09/07/2017    CR 0.51 06/01/2017    CR 0.61 01/19/2017    CR 0.57 01/18/2017    CR 0.55 01/17/2017    CR 0.54 01/16/2017    CR 0.58 12/16/2016       Hemoglobin   Date Value Ref Range Status   10/18/2018 12.7 11.7 - 15.7 g/dL Final       Chest imaging demonstrates no evidence of recurrence  Abdominal imaging demonstrates no evidence of recurrence    Assessment/Plan:     Hx of Renal Cell Cancer [id number], chromophobe, s/p Robotic Partial Nephrectomy   Follow up in 12 months with:    CT of Abd with and without IV contrast, no oral contrast needed    Chest CT scan    BMP, CBC      Scribe Disclosure:   ITodd, am serving as a scribe; to document services personally performed by Jairo Cross MD based on data collection and the provider's statements to me.     Jairo Cross MD  Department of Urology  [facility]" "[place]    Attestation:  This patient was seen and evaluated by me, with a scribe taking notes.  I have reviewed the note above and agree.  The physical exam was performed by me and the pertinant details are outlined below.     Patient is a 37 year old female   Vitals: Blood pressure 102/69, pulse 72, height 1.6 m (5' 3\"), weight 70.8 kg (156 lb), not currently breastfeeding.  General Appearance Adult: Alert, no acute distress, oriented      Assessment/Plan:     Hx of Renal Cell Cancer [id number], chromophobe, s/p Robotic Partial Nephrectomy   Follow up in 12 months with:     CT of Abd with and without IV contrast, no oral contrast needed    Chest CT scan    BMP, CBC    Jairo Cross MD  Department of Urology  [facility]        *AJCC/pTNM staging 2010 where   T1a (<=4cm), T1b  (4-7cm)  T2a (7-10 cm), T2b (>10 cm)   T3a (renal vein thrombus, perirenal or renal sinus fat invasion)   T3b (tumor thrombus to IVC, below diaphragm)  T3c (tumor thrombus to IVC, above diaphragm)   T4 (tumor invades beyond Gerota's fascia)  Nx regional nodes not assessed  N0 No lymph node metastasis  N1 Lymph node metastasis  M0 No distant Mets  M1 Distant Mestastasis    "

## 2018-10-18 NOTE — DISCHARGE INSTRUCTIONS

## 2018-10-18 NOTE — LETTER
November 7, 2018       TO: Brynn Sprague  [address] [place] [address]       DearMs.Darcie,    We are writing to inform you of your test results.    Your test results fall within the expected range(s) or remain unchanged from previous results.  Please continue with current treatment plan.    Resulted Orders   CT Abdomen Pelvis w Contrast    Narrative    EXAMINATION: CT ABDOMEN PELVIS W CONTRAST, 10/18/2018 7:34 AM    TECHNIQUE:  Helical CT images from the lung bases through the  symphysis pubis were obtained with IV contrast in portal venous phase.  Late arterial phase images of the upper abdomen also obtained.  Contrast dose: Isovue 370 96cc    COMPARISON: CT 1/11/2018    PROVIDED HISTORY: Renal cell carcinoma of right kidney (H).     ADDITIONAL HISTORY FROM THE EMR: 37-year-old woman with a history of  renal cell carcinoma of the left kidney status post partial left  nephrectomy on 1/16/2017.    FINDINGS:  CHEST:  LUNG BASES: Clear.    ABDOMEN/PELVIS:  LIVER: Unchanged subcentimeter hypodensity in the subcapsular right  lobe of the liver with associated peripheral calcification, too small  to fully characterize by CT (series 7, image 70). Patent hepatic  vasculature.    BILIARY: Within normal limits.    PANCREAS: Within normal limits.    SPLEEN: Within normal limits.    ADRENAL GLANDS: Within normal limits.    URINARY TRACT: Postsurgical changes of partial nephrectomy of the left  renal upper pole. No focal masslike attenuation or enhancement at the  resection site suggest local recurrence. Remainder of the kidneys are  unremarkable. No hydronephrosis. Bladder is unremarkable.    REPRODUCTIVE ORGANS: Intrauterine device properly positioned at the  uterine fundus. Oval-shaped relative hypoenhancing structure in the  left uterine wall suggestive of a fibroid. Normal follicular  architecture of the ovaries.    GASTROINTESTINAL TRACT: Normal caliber of the small and large bowel.  Normal  appendix.    PERITONEUM/FLUID: Trace free fluid in the pelvis, nonspecific in a  premenopausal patient. No free air.    VESSELS: Normal caliber of the abdominal aorta.    LYMPH NODES: No enlarged lymph nodes.    BONES/SOFT TISSUES: Unchanged sclerosis in the right iliac bone, most  suggestive of a bone island. No aggressive osseous lesions. Minimal  degenerative changes of the spine. Small fat-containing umbilical  hernia.      Impression    IMPRESSION: In this patient with a history of renal cell carcinoma  status post partial left nephrectomy, there is no evidence of locally  recurrent or metastatic disease in the abdomen or pelvis.     I have personally reviewed the examination and initial interpretation  and I agree with the findings.    BRAIN LUDWIG MD       Thank you for choosing [facility] for your care. Please follow up as previously planned.  Please call with any questions or concerns.    Thank you,  Loan Maharaj RN Care Coordinator for  Dr. Jairo Cross

## 2018-10-18 NOTE — LETTER
"10/18/2018       RE: Brynn Sprague  [address]  [place] [address]     Dear Colleague,    Thank you for referring your patient, Brynn Sprague, to the [facility] AND [facility] FOR PROSTATE AND UROLOGIC CANCERS at [facility]. Please see a copy of my visit note below.    Kidney Cancer Follow Up     Brynn Sprague is a very pleasant 37 year old female who presents with a history of a Renal Cell Cancer.    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    ISUP Grade: na  Pathologic Stage T2a see below*.    Maximal tumor dimension was 8.7 cm.    Tumor thrombus level  not applicable.    Tumor necrosis present: No  Sarcomatoid features present: No  Patient is status post Robotic Partial Nephrectomy on 1/16/2017.   Tumor was on the left side.     Today she is doing well. She reports one episode of left-sided flank pain last week, believes this may be musculoskeletal in nature. No evidence of hydronephrosis or recurrence on scans today.     There is no evidence of disease recurrence to date.    Physical Exam  Vitals: /69  Pulse 72  Ht 1.6 m (5' 3\")  Wt 70.8 kg (156 lb)  BMI 27.63 kg/m2   General Appearance Adult: A&O in NAD   Abd is soft, non-distended, incisions are well-healed, no hernia      Recent Kidney Function    Lab Results   Component Value Date    CR 0.64 10/18/2018    CR 0.59 01/11/2018    CR 0.63 10/13/2017    CR 0.54 09/07/2017    CR 0.51 06/01/2017    CR 0.61 01/19/2017    CR 0.57 01/18/2017    CR 0.55 01/17/2017    CR 0.54 01/16/2017    CR 0.58 12/16/2016       Hemoglobin   Date Value Ref Range Status   10/18/2018 12.7 11.7 - 15.7 g/dL Final       Chest imaging demonstrates no evidence of recurrence  Abdominal imaging demonstrates no evidence of recurrence    Assessment/Plan:     Hx of Renal Cell Cancer [id number], chromophobe, s/p Robotic Partial Nephrectomy   Follow up in 12 months with:    CT of Abd with and without IV contrast, no oral contrast needed    " "Chest CT scan    BMP, CBC      Scribe Disclosure:   I,Todd Santiago, am serving as a scribe; to document services personally performed by Jairo Cross MD based on data collection and the provider's statements to me.     Jairo Cross MD  Department of Urology  [facility]    Attestation:  This patient was seen and evaluated by me, with a scribe taking notes.  I have reviewed the note above and agree.  The physical exam was performed by me and the pertinant details are outlined below.     Patient is a 37 year old female   Vitals: Blood pressure 102/69, pulse 72, height 1.6 m (5' 3\"), weight 70.8 kg (156 lb), not currently breastfeeding.  General Appearance Adult: Alert, no acute distress, oriented      Assessment/Plan:     Hx of Renal Cell Cancer [id number], chromophobe, s/p Robotic Partial Nephrectomy   Follow up in 12 months with:     CT of Abd with and without IV contrast, no oral contrast needed    Chest CT scan    BMP, CBC    Jairo Cross MD  Department of Urology  [facility]        *AJCC/pTNM staging 2010 where   T1a (<=4cm), T1b  (4-7cm)  T2a (7-10 cm), T2b (>10 cm)   T3a (renal vein thrombus, perirenal or renal sinus fat invasion)   T3b (tumor thrombus to IVC, below diaphragm)  T3c (tumor thrombus to IVC, above diaphragm)   T4 (tumor invades beyond Gerota's fascia)  Nx regional nodes not assessed  N0 No lymph node metastasis  N1 Lymph node metastasis  M0 No distant Mets  M1 Distant Mestastasis    "

## 2018-10-18 NOTE — PATIENT INSTRUCTIONS
Schedule one year follow up with labs and prior to appointment      It was a pleasure meeting with you today.  Thank you for allowing me and my team the privilege of caring for you today.  YOU are the reason we are here, and I truly hope we provided you with the excellent service you deserve.  Please let us know if there is anything else we can do for you so that we can be sure you are leaving completely satisfied with your care experience.      Romero Poon

## 2018-10-18 NOTE — MR AVS SNAPSHOT
After Visit Summary   10/18/2018    Brynn Sprague    MRN: [id number]           Patient Information     Date Of Birth          1980        Visit Information        Provider Department      10/18/2018 8:00 AM Weight, Jairo Castro MD [facility] Urologic C[facility]        Today's Diagnoses     Malignant neoplasm of right kidney (H)    -  1      Care Instructions    Schedule one year follow up with labs and prior to appointment      It was a pleasure meeting with you today.  Thank you for allowing me and my team the privilege of caring for you today.  YOU are the reason we are here, and I truly hope we provided you with the excellent service you deserve.  Please let us know if there is anything else we can do for you so that we can be sure you are leaving completely satisfied with your care experience.      Romero Poon           Follow-ups after your visit        Your next 10 appointments already scheduled     Oct 10, 2019  7:00 AM CDT   LAB with  LAB   [facility] ([facility])    [address] [id number] [phone]           Please do not eat 10-12 hours before your appointment if you are coming in fasting for labs on lipids, cholesterol, or glucose (sugar). This does not apply to pregnant women. Water, hot tea and black coffee (with nothing added) are okay. Do not drink other fluids, diet soda or chew gum.            Oct 10, 2019  7:20 AM CDT   CT ABDOMEN PELVIS W CONTRAST with UCCT2   [facility] ([facility])    [address] [address] [phone]           How do I prepare for my exam? (Food and drink instructions) To prepare: Do not eat or drink for 2 hours before your exam. If you need to take medicine, you may take it with small sips of water. (We may ask you to take liquid medicine as well.)  How do I  prepare for my exam? (Other instructions) Please arrive 30 minutes early for your CT.  Once in the department you might be asked to drink water 15-20 minutes prior to your exam.  If indicated you may be asked to drink an oral contrast in advance of your CT.  If this is the case, the imaging team will let you know or be in contact with you prior to your appointment  Patients over 70 or patients with diabetes or kidney problems: If you haven t had a blood test (creatinine test) within the last 30 days, the Cardiologist/Radiologist may require you to get this test prior to your exam.  If you have diabetes:  Continue to take your metformin medication on the day of your exam  What should I wear: Please wear loose clothing, such as a sweat suit or jogging clothes. Avoid snaps, zippers and other metal. We may ask you to undress and put on a hospital gown.  How long does the exam take: Most scans take less than 20 minutes.  What should I bring: Please bring any scans or X-rays taken at other hospitals, if similar tests were done. Also bring a list of your medicines, including vitamins, minerals and over-the-counter drugs. It is safest to leave personal items at home.  Do I need a : No  is needed.  What do I need to tell my doctor? Be sure to tell your doctor: * If you have any allergies. * If there s any chance you are pregnant. * If you are breastfeeding.  What should I do after the exam: No restrictions, You may resume normal activities.  What is this test: A CT (computed tomography) scan is a series of pictures that allows us to look inside your body. The scanner creates images of the body in cross sections, much like slices of bread. This helps us see any problems more clearly. You may receive contrast (X-ray dye) before or during your scan. You will be asked to drink the contrast.  Who should I call with questions: If you have any questions, please call the Imaging Department where you will have your exam.  Directions, parking instructions, and other information is available on our website, [email or web address].            Oct 10, 2019  7:40 AM CDT   XR CHEST 2 VIEWS with UCXR1   [facility] Xray ([facility])    [address] [place] [id number] [phone]           How do I prepare for my exam? (Food and drink instructions) No Food and Drink Restrictions.  How do I prepare for my exam? (Other instructions) You do not need to do anything special for this exam.  What should I wear: Wear comfortable clothes.  How long does the exam take: Most scans take less than 5 minutes.  What should I bring: Bring a list of your medicines, including vitamins, minerals and over-the-counter drugs. It is safest to leave personal items at home.  Do I need a :  No  is needed.  What do I need to tell my doctor: Tell your doctor if there s any chance you are pregnant.  What should I do after the exam: No restrictions, You may resume normal activities.  What is this test: An image of a specific body part shown in shades of black and white.  Who should I call with questions: If you have any questions, please call the Imaging Department where you will have your exam. Directions, parking instructions, and other information is available on our website, [email or web address].            Oct 10, 2019  8:00 AM CDT   (Arrive by 7:45 AM)   Return Visit with Jairo Cross MD   [facility] Prostate and Urologic Cancers ([facility])    [address] [place] [id number] [phone]              Future tests that were ordered for you today     Open Future Orders        Priority Expected Expires Ordered    CBC with platelets differential Routine 10/19/2018 10/18/2019 10/18/2018    Basic metabolic panel Routine 10/19/2018 10/18/2019 10/18/2018    CT Abdomen Pelvis w/Contrast Routine 10/19/2018  "10/18/2019 10/18/2018    X-Ray Chest 2 Views Routine 10/19/2018 10/18/2019 10/18/2018            Who to contact     Please call your clinic at [phone] to:    Ask questions about your health    Make or cancel appointments    Discuss your medicines    Learn about your test results    Speak to your doctor            Additional Information About Your Visit        Care EveryWhere ID     This is your Care EveryWhere ID. This could be used by other organizations to access your [facility] medical records  [id number]        Your Vitals Were     Pulse Height BMI (Body Mass Index)             72 1.6 m (5' 3\") 27.63 kg/m2          Blood Pressure from Last 3 Encounters:   10/18/18 102/69   09/24/18 109/73   01/11/18 110/67    Weight from Last 3 Encounters:   10/18/18 70.8 kg (156 lb)   09/24/18 70.8 kg (156 lb)   01/11/18 71.8 kg (158 lb 3.2 oz)               Primary Care Provider Office Phone # Fax #    Dallas Parra PA-C [phone]       [address] [facility] [address]        Equal Access to Services     [facility]: Hadii aad ku hadasho Soomaali, waaxda luqadaha, qaybta kaalmada adeegyada, waxay idiin hayaan adeeg kharash la'aan ah. So [facility] [phone].    ATENCIÓN: Si habla español, tiene a olvera disposición servicios gratuitos de asistencia lingüística. Llame al [phone].    We comply with applicable federal civil rights laws and [place] laws. We do not discriminate on the basis of race, color, national origin, age, disability, sex, sexual orientation, or gender identity.            Thank you!     Thank you for choosing [facility] AND [facility] FOR PROSTATE AND UROLOGIC CANCERS  for your care. Our goal is always to provide you with excellent care. Hearing back from our patients is one way we can continue to improve our services. Please take a few minutes to complete the written survey that you may receive in the mail after your visit with us. Thank you!             Your Updated Medication " List - Protect others around you: Learn how to safely use, store and throw away your medicines at www.disposemymeds.org.          This list is accurate as of 10/18/18  9:35 AM.  Always use your most recent med list.                   Brand Name Dispense Instructions for use Diagnosis    diclofenac 75 MG EC tablet    VOLTAREN    30 tablet    Take 1 tablet (75 mg) by mouth 2 times daily as needed for moderate pain    Ankle injury, right, initial encounter       hydrocortisone 25 MG Suppository    ANUSOL-HC     Place 25 mg rectally        levonorgestrel 20 MCG/24HR IUD    MIRENA     1 each by Intrauterine route once        order for DME     1 Device    Equipment being ordered: ankle brace    Ankle injury, right, initial encounter

## 2018-10-25 ENCOUNTER — OFFICE VISIT (OUTPATIENT)
Dept: URGENT CARE | Facility: URGENT CARE | Age: 38
End: 2018-10-25
Payer: COMMERCIAL

## 2018-10-25 ENCOUNTER — RADIANT APPOINTMENT (OUTPATIENT)
Dept: GENERAL RADIOLOGY | Facility: CLINIC | Age: 38
End: 2018-10-25
Attending: PHYSICIAN ASSISTANT
Payer: COMMERCIAL

## 2018-10-25 VITALS
TEMPERATURE: 97 F | SYSTOLIC BLOOD PRESSURE: 107 MMHG | DIASTOLIC BLOOD PRESSURE: 62 MMHG | OXYGEN SATURATION: 99 % | HEART RATE: 74 BPM | BODY MASS INDEX: 28.17 KG/M2 | WEIGHT: 159 LBS

## 2018-10-25 DIAGNOSIS — M25.511 ACUTE PAIN OF RIGHT SHOULDER: ICD-10-CM

## 2018-10-25 DIAGNOSIS — M75.41 IMPINGEMENT SYNDROME OF RIGHT SHOULDER: Primary | ICD-10-CM

## 2018-10-25 PROCEDURE — 99213 OFFICE O/P EST LOW 20 MIN: CPT | Performed by: PHYSICIAN ASSISTANT

## 2018-10-25 PROCEDURE — 73030 X-RAY EXAM OF SHOULDER: CPT | Mod: RT

## 2018-10-25 ASSESSMENT — PAIN SCALES - GENERAL: PAINLEVEL: WORST PAIN (10)

## 2018-10-25 NOTE — MR AVS SNAPSHOT
After Visit Summary   10/25/2018    Brynn Sprague    MRN: [id number]           Patient Information     Date Of Birth          1980        Visit Information        Provider Department      10/25/2018 7:40 PM Monet Negron PA-C [facility]        Today's Diagnoses     Impingement syndrome of right shoulder    -  1    Acute pain of right shoulder          Care Instructions    Your xrays showed no broken bones or shoulder dislocation.    Symptoms are consistent with impingement which is a problem within the shoulder joint that can lead to stiffness and pain.    Please follow up with orthopedic specialist in 3-5 days. Referral has been placed.    Continue with diclofenac as needed for pain.  May also take additional Tylenol 500mg every 6 hours for pain.  Use heating pad daily as well.  Practice gentle stretching and range of motion exercises so you don't get more stiff.              Follow-ups after your visit        Additional Services     ORTHO  REFERRAL       [facility] is referring you to the Orthopedic  Services at [facility].       The  Representative will assist you in the coordination of your Orthopedic and Musculoskeletal Care as prescribed by your physician.    The  Representative will call you within 1 business day to help schedule your appointment, or you may contact the  Representative at:    All areas ~ [phone]     Type of Referral : Non Surgical / Sport Medicine       Timeframe requested: 3 - 5 days    Coverage of these services is subject to the terms and limitations of your health insurance plan.  Please call member services at your health plan with any benefit or coverage questions.      If X-rays, CT or MRI's have been performed, please contact the facility where they were done to arrange for , prior to your scheduled appointment.  Please bring this referral  request to your appointment and present it to your specialist.                  Follow-up notes from your care team     Return in about 1 week (around 11/1/2018) for orthopedics.      Your next 10 appointments already scheduled     Oct 10, 2019  7:00 AM CDT   LAB with  LAB   [facility] ([facility])    [address] [place] [id number] [phone]           Please do not eat 10-12 hours before your appointment if you are coming in fasting for labs on lipids, cholesterol, or glucose (sugar). This does not apply to pregnant women. Water, hot tea and black coffee (with nothing added) are okay. Do not drink other fluids, diet soda or chew gum.            Oct 10, 2019  7:20 AM CDT   CT ABDOMEN PELVIS W CONTRAST with UCCT2   [facility] CT ([facility])    [address] [place] [id number] [phone]           How do I prepare for my exam? (Food and drink instructions) To prepare: Do not eat or drink for 2 hours before your exam. If you need to take medicine, you may take it with small sips of water. (We may ask you to take liquid medicine as well.)  How do I prepare for my exam? (Other instructions) Please arrive 30 minutes early for your CT.  Once in the department you might be asked to drink water 15-20 minutes prior to your exam.  If indicated you may be asked to drink an oral contrast in advance of your CT.  If this is the case, the imaging team will let you know or be in contact with you prior to your appointment  Patients over 70 or patients with diabetes or kidney problems: If you haven t had a blood test (creatinine test) within the last 30 days, the Cardiologist/Radiologist may require you to get this test prior to your exam.  If you have diabetes:  Continue to take your metformin medication on the day of your exam  What should I wear: Please wear loose clothing, such as a sweat suit or  jogging clothes. Avoid snaps, zippers and other metal. We may ask you to undress and put on a hospital gown.  How long does the exam take: Most scans take less than 20 minutes.  What should I bring: Please bring any scans or X-rays taken at other hospitals, if similar tests were done. Also bring a list of your medicines, including vitamins, minerals and over-the-counter drugs. It is safest to leave personal items at home.  Do I need a : No  is needed.  What do I need to tell my doctor? Be sure to tell your doctor: * If you have any allergies. * If there s any chance you are pregnant. * If you are breastfeeding.  What should I do after the exam: No restrictions, You may resume normal activities.  What is this test: A CT (computed tomography) scan is a series of pictures that allows us to look inside your body. The scanner creates images of the body in cross sections, much like slices of bread. This helps us see any problems more clearly. You may receive contrast (X-ray dye) before or during your scan. You will be asked to drink the contrast.  Who should I call with questions: If you have any questions, please call the Imaging Department where you will have your exam. Directions, parking instructions, and other information is available on our website, [email or web address].            Oct 10, 2019  7:40 AM CDT   XR CHEST 2 VIEWS with UCXR1   [facility] ([facility])    [address] [address] [phone]           How do I prepare for my exam? (Food and drink instructions) No Food and Drink Restrictions.  How do I prepare for my exam? (Other instructions) You do not need to do anything special for this exam.  What should I wear: Wear comfortable clothes.  How long does the exam take: Most scans take less than 5 minutes.  What should I bring: Bring a list of your medicines, including vitamins, minerals and over-the-counter drugs.  It is safest to leave personal items at home.  Do I need a :  No  is needed.  What do I need to tell my doctor: Tell your doctor if there s any chance you are pregnant.  What should I do after the exam: No restrictions, You may resume normal activities.  What is this test: An image of a specific body part shown in shades of black and white.  Who should I call with questions: If you have any questions, please call the Imaging Department where you will have your exam. Directions, parking instructions, and other information is available on our website, [email or web address].            Oct 10, 2019  8:00 AM CDT   (Arrive by 7:45 AM)   Return Visit with Jairo Cross MD   [facility] for Prostate and Urologic Cancers ([facility])    [address] [place] [address] [phone]              Who to contact     If you have questions or need follow up information about today's clinic visit or your schedule please contact [facility] directly at [phone].  Normal or non-critical lab and imaging results will be communicated to you by MyChart, letter or phone within 4 business days after the clinic has received the results. If you do not hear from us within 7 days, please contact the clinic through MyChart or phone. If you have a critical or abnormal lab result, we will notify you by phone as soon as possible.  Submit refill requests through [employer] or call your pharmacy and they will forward the refill request to us. Please allow 3 business days for your refill to be completed.          Additional Information About Your Visit        Care EveryWhere ID     This is your Care EveryWhere ID. This could be used by other organizations to access your [facility] medical records  [id number]        Your Vitals Were     Pulse Temperature Pulse Oximetry BMI (Body Mass Index)          74 97  F (36.1  C) (Oral) 99% 28.17 kg/m2          Blood Pressure from Last 3 Encounters:   10/25/18 107/62   10/18/18 102/69   09/24/18 109/73    Weight from Last 3 Encounters:   10/25/18 159 lb (72.1 kg)   10/18/18 156 lb (70.8 kg)   09/24/18 156 lb (70.8 kg)              We Performed the Following     ORTHO  REFERRAL     XR Shoulder Right 2 Views        Primary Care Provider Office Phone # Fax #    Dallas Parra PA-C [phone]       [address]  [place] [address]        Equal Access to Services     [facility]: Hadii aad ku hadasho Soomaali, waaxda luqadaha, qaybta kaalmada adeegyada, waxyin idiin hayaan adeeg justa neumann . So [place] [phone].    ATENCIÓN: Si habla español, tiene a olvera disposición servicios gratuitos de asistencia lingüística. [facility] [phone].    We comply with applicable federal civil rights laws and [place] laws. We do not discriminate on the basis of race, color, national origin, age, disability, sex, sexual orientation, or gender identity.            Thank you!     Thank you for choosing [facility] [place]  for your care. Our goal is always to provide you with excellent care. Hearing back from our patients is one way we can continue to improve our services. Please take a few minutes to complete the written survey that you may receive in the mail after your visit with us. Thank you!             Your Updated Medication List - Protect others around you: Learn how to safely use, store and throw away your medicines at www.disposemymeds.org.          This list is accurate as of 10/25/18  8:31 PM.  Always use your most recent med list.                   Brand Name Dispense Instructions for use Diagnosis    diclofenac 75 MG EC tablet    VOLTAREN    30 tablet    Take 1 tablet (75 mg) by mouth 2 times daily as needed for moderate pain    Ankle injury, right, initial encounter       hydrocortisone 25 MG Suppository    ANUSOL-HC     Place 25 mg rectally        levonorgestrel 20 MCG/24HR IUD     MIRENA     1 each by Intrauterine route once        order for DME     1 Device    Equipment being ordered: ankle brace    Ankle injury, right, initial encounter

## 2018-10-25 NOTE — LETTER
October 25, 2018      Brynn Sprague  [address]  [place] [address]        To Whom It May Concern:    Brynn Sprague  was seen on 10/25/18.  Please excuse her absence from work on 10/30/2018 due to injury.        Sincerely,        Monet Negron PA-C

## 2018-10-26 ENCOUNTER — OFFICE VISIT (OUTPATIENT)
Dept: ORTHOPEDICS | Facility: CLINIC | Age: 38
End: 2018-10-26
Payer: COMMERCIAL

## 2018-10-26 VITALS
HEIGHT: 63 IN | SYSTOLIC BLOOD PRESSURE: 100 MMHG | WEIGHT: 159 LBS | DIASTOLIC BLOOD PRESSURE: 62 MMHG | BODY MASS INDEX: 28.17 KG/M2

## 2018-10-26 DIAGNOSIS — M25.511 ACUTE PAIN OF RIGHT SHOULDER: Primary | ICD-10-CM

## 2018-10-26 PROCEDURE — 99244 OFF/OP CNSLTJ NEW/EST MOD 40: CPT | Performed by: PEDIATRICS

## 2018-10-26 RX ORDER — DICLOFENAC SODIUM 75 MG/1
75 TABLET, DELAYED RELEASE ORAL 2 TIMES DAILY PRN
Qty: 30 TABLET | Refills: 0 | Status: SHIPPED | OUTPATIENT
Start: 2018-10-26 | End: 2019-05-06

## 2018-10-26 NOTE — NURSING NOTE
"Chief Complaint   Patient presents with     Musculoskeletal Problem     C/o right arm pain all week. Radiates up to shoulder. ROM affected       Initial /62  Pulse 74  Temp 97  F (36.1  C) (Oral)  Wt 159 lb (72.1 kg)  SpO2 99%  BMI 28.17 kg/m2 Estimated body mass index is 28.17 kg/(m^2) as calculated from the following:    Height as of 10/18/18: 5' 3\" (1.6 m).    Weight as of this encounter: 159 lb (72.1 kg)..  BP completed using cuff size: regular    Aurora Lay CMA    "

## 2018-10-26 NOTE — PROGRESS NOTES
"SUBJECTIVE:   Brynn Sprague is a 37 year old female presenting for evaluation of   Chief Complaint   Patient presents with     Musculoskeletal Problem     C/o right arm pain all week. Radiates up to shoulder. ROM affected   .    MS Injury/Pain    Onset of symptoms was 4 day(s) ago.  Location: right shoulder  Context:       The injury happened while: no specific injury recently. She works as a , uses her arms a lot at work  Course of symptoms is worsening.    Severity moderate  Current and Associated symptoms:   Pain in the right shoulder, humerus, radiates to elbow.  Pain feels like achy.  She is right handed.  Aggravating Factors: reaching down and reaching overhead- states she \"cannot\"  Therapies to improve symptoms include: diclofenac, no improvement  This is not the first time this type of problem has occurred for this patient.   Has had right shoulder impingement in 2016.  No history of surgeries on the right upper extremity.    ROS   See HPI      PMH:  Past Medical History:   Diagnosis Date     Chronic neutropenia (H)     possibly benign essential neutropenia     GERD (gastroesophageal reflux disease)      Left renal mass      Normal pregnancy 2006,08,09,12     Pulmonary nodules      Renal cell carcinoma (H)      Patient Active Problem List    Diagnosis Date Noted     Renal cancer (H) 01/16/2017     Priority: Medium     Impingement syndrome of right shoulder 02/08/2016     Priority: Medium     Cervical polyp 10/31/2011     Priority: Medium     CARDIOVASCULAR SCREENING; LDL GOAL LESS THAN 160 10/31/2010     Priority: Medium         Current medications:  Current Outpatient Prescriptions   Medication Sig Dispense Refill     diclofenac (VOLTAREN) 75 MG EC tablet Take 1 tablet (75 mg) by mouth 2 times daily as needed for moderate pain 30 tablet 1     hydrocortisone (ANUSOL-HC) 25 MG Suppository Place 25 mg rectally       levonorgestrel (MIRENA) 20 MCG/24HR IUD 1 each by Intrauterine route once       order " for DME Equipment being ordered: ankle brace 1 Device 0       Surgical History:  Past Surgical History:   Procedure Laterality Date     DAVINCI NEPHRECTOMY PARTIAL Left 1/16/2017    Procedure: DAVINCI NEPHRECTOMY PARTIAL;  Surgeon: Jairo Cross MD;  Location: [facility] OR     INSERT INTRAUTERINE DEVICE  10/16/15       Family history:  Family History   Problem Relation Age of Onset     Family History Negative Mother      Family History Negative Father      Cancer No family hx of          Social History:  Social History   Substance Use Topics     Smoking status: Never Smoker     Smokeless tobacco: Never Used     Alcohol use No           OBJECTIVE  /62  Pulse 74  Temp 97  F (36.1  C) (Oral)  Wt 159 lb (72.1 kg)  SpO2 99%  BMI 28.17 kg/m2    Physical Exam    General: well-appearing, no acute distress.   Neck: supple  Lungs:  No distress.  Muscloloskeletal: right upper extremity: no bony tenderness. No crepitus. No ecchymosis. No visible muscular atrophy. Tenderness of trapezius and deltoid musculature.  AROM: marked decreased AROM of right shoulder. Abduction to 60 degrees. Forward flexion to 60 degrees. External rotation to 60. Internal rotation - completely unable to perform.  Strength: abduction 535. Resisted internal and external rotation 3/5.   Elbow range of motion is full  Full strength of the elbow  Intact neurovascular status distally  Full range of motion of the neck  Neuro: Alert. Sensation to light touch intact in distal right upper extremity.          Labs:  No results found for this or any previous visit (from the past 24 hour(s)).    X-Ray was done, my findings are: no fracture or dislocation. No lytic lesions.         ASSESSMENT/PLAN:      ICD-10-CM    1. Impingement syndrome of right shoulder M75.41 ORTHO  REFERRAL   2. Acute pain of right shoulder M25.511 XR Shoulder Right 2 Views     ORTHO  REFERRAL        Medical Decision Making:    Serious Comorbid Conditions:  Renal cell carcinoma    Differential Diagnosis:  -impingement  -rotator cuff tear- though no acute injury to have caused this  -bony mets/lytic lesions- none seen on shoulder xray today  -pathologic fracture- none seen on xray today    Most likely dx is impingement, which is listed on her chart already but she has no recollection of this.  I will refer her to ortho for evaluation, perhaps for steroid injection vs PT.  Recommend gentle ROM exercises so as not to get stiff.  May continue with diclofenac for pain. May add in Tylenol as well.  Consider heating pad at the end of the day.  Ortho referral placed.    At the end of the encounter, I discussed all available results with patient. Discussed diagnosis and treatment plan.   Return precautions provided to patient and printed below in patient instructions.  Patient understood and agreed to plan. Patient was appropriate for discharge.        Patient Instructions   Your xrays showed no broken bones or shoulder dislocation.    Symptoms are consistent with impingement which is a problem within the shoulder joint that can lead to stiffness and pain.    Please follow up with orthopedic specialist in 3-5 days. Referral has been placed.    Continue with diclofenac as needed for pain.  May also take additional Tylenol 500mg every 6 hours for pain.  Use heating pad daily as well.  Practice gentle stretching and range of motion exercises so you don't get more stiff.                Monet Negron PA-C  10/25/18 8:44 PM

## 2018-10-26 NOTE — MR AVS SNAPSHOT
After Visit Summary   10/26/2018    Brynn Sprague    MRN: [id number]           Patient Information     Date Of Birth          1980        Visit Information        Provider Department      10/26/2018 10:00 AM Antonio Gonzalez,  [facility]        Today's Diagnoses     Acute pain of right shoulder    -  1      Care Instructions    Home exercise program given           Follow-ups after your visit        Follow-up notes from your care team     Return in about 7 days (around 11/2/2018), or if symptoms worsen or fail to improve.      Your next 10 appointments already scheduled     Oct 10, 2019  7:00 AM CDT   LAB with  LAB   [facility] ([facility] [place])    [address] [place] [id number] [phone]           Please do not eat 10-12 hours before your appointment if you are coming in fasting for labs on lipids, cholesterol, or glucose (sugar). This does not apply to pregnant women. Water, hot tea and black coffee (with nothing added) are okay. Do not drink other fluids, diet soda or chew gum.            Oct 10, 2019  7:20 AM CDT   CT ABDOMEN PELVIS W CONTRAST with UCCT2   [facility] [place] [facility] ([facility] [place])    [address] [place] [id number] [phone]           How do I prepare for my exam? (Food and drink instructions) To prepare: Do not eat or drink for 2 hours before your exam. If you need to take medicine, you may take it with small sips of water. (We may ask you to take liquid medicine as well.)  How do I prepare for my exam? (Other instructions) Please arrive 30 minutes early for your CT.  Once in the department you might be asked to drink water 15-20 minutes prior to your exam.  If indicated you may be asked to drink an oral contrast in advance of your CT.  If this is the case, the imaging team will let you know or be in contact with you prior to  your appointment  Patients over 70 or patients with diabetes or kidney problems: If you haven t had a blood test (creatinine test) within the last 30 days, the Cardiologist/Radiologist may require you to get this test prior to your exam.  If you have diabetes:  Continue to take your metformin medication on the day of your exam  What should I wear: Please wear loose clothing, such as a sweat suit or jogging clothes. Avoid snaps, zippers and other metal. We may ask you to undress and put on a hospital gown.  How long does the exam take: Most scans take less than 20 minutes.  What should I bring: Please bring any scans or X-rays taken at other hospitals, if similar tests were done. Also bring a list of your medicines, including vitamins, minerals and over-the-counter drugs. It is safest to leave personal items at home.  Do I need a : No  is needed.  What do I need to tell my doctor? Be sure to tell your doctor: * If you have any allergies. * If there s any chance you are pregnant. * If you are breastfeeding.  What should I do after the exam: No restrictions, You may resume normal activities.  What is this test: A CT (computed tomography) scan is a series of pictures that allows us to look inside your body. The scanner creates images of the body in cross sections, much like slices of bread. This helps us see any problems more clearly. You may receive contrast (X-ray dye) before or during your scan. You will be asked to drink the contrast.  Who should I call with questions: If you have any questions, please call the Imaging Department where you will have your exam. Directions, parking instructions, and other information is available on our website, [email or web address].            Oct 10, 2019  7:40 AM CDT   XR CHEST 2 VIEWS with UCXR1   [facility] [place] [facility] ([facility])    [address]  1st Floor  [facility] [address] [phone]           How do I prepare for  my exam? (Food and drink instructions) No Food and Drink Restrictions.  How do I prepare for my exam? (Other instructions) You do not need to do anything special for this exam.  What should I wear: Wear comfortable clothes.  How long does the exam take: Most scans take less than 5 minutes.  What should I bring: Bring a list of your medicines, including vitamins, minerals and over-the-counter drugs. It is safest to leave personal items at home.  Do I need a :  No  is needed.  What do I need to tell my doctor: Tell your doctor if there s any chance you are pregnant.  What should I do after the exam: No restrictions, You may resume normal activities.  What is this test: An image of a specific body part shown in shades of black and white.  Who should I call with questions: If you have any questions, please call the Imaging Department where you will have your exam. Directions, parking instructions, and other information is available on our website, [email or web address].            Oct 10, 2019  8:00 AM CDT   (Arrive by 7:45 AM)   Return Visit with Jairo Cross MD   [facility] ([facility])    [address] [place] [address] [phone]              Who to contact     If you have questions or need follow up information about today's clinic visit or your schedule please contact [facility] directly at [phone].  Normal or non-critical lab and imaging results will be communicated to you by MyChart, letter or phone within 4 business days after the clinic has received the results. If you do not hear from us within 7 days, please contact the clinic through MyChart or phone. If you have a critical or abnormal lab result, we will notify you by phone as soon as possible.  Submit refill requests through [employer] or call your pharmacy and they will forward the refill  "request to us. Please allow 3 business days for your refill to be completed.          Additional Information About Your Visit        Care EveryWhere ID     This is your Care EveryWhere ID. This could be used by other organizations to access your [place] medical records  [id number]        Your Vitals Were     Height BMI (Body Mass Index)                5' 3\" (1.6 m) 28.17 kg/m2           Blood Pressure from Last 3 Encounters:   10/26/18 100/62   10/25/18 107/62   10/18/18 102/69    Weight from Last 3 Encounters:   10/26/18 159 lb (72.1 kg)   10/25/18 159 lb (72.1 kg)   10/18/18 156 lb (70.8 kg)              Today, you had the following     No orders found for display         Where to get your medicines      These medications were sent to [facility] - [address] [facility]  [address] [facility] [address]     Phone:  [phone]     diclofenac 75 MG EC tablet          Primary Care Provider Office Phone # Fax #    Dallas Parra PA-C [phone] [address]  [place] [address]        Equal Access to Services     KATELYNN AMAYA AH: Hadii aad ku hadasho Soomaali, waaxda luqadaha, qaybta kaalmada adeegyada, graciela gonzalezin haymirandan simone vargas. So [facility] [phone].    ATENCIÓN: Si habla español, tiene a olvera disposición servicios gratuitos de asistencia lingüística. Jeremieame al [phone].    We comply with applicable federal civil rights laws and [place] laws. We do not discriminate on the basis of race, color, national origin, age, disability, sex, sexual orientation, or gender identity.            Thank you!     Thank you for choosing [facility]  for your care. Our goal is always to provide you with excellent care. Hearing back from our patients is one way we can continue to improve our services. Please take a few minutes to complete the written survey that you may receive in the mail after your visit with us. Thank " you!             Your Updated Medication List - Protect others around you: Learn how to safely use, store and throw away your medicines at www.disposemymeds.org.          This list is accurate as of 10/26/18 11:22 AM.  Always use your most recent med list.                   Brand Name Dispense Instructions for use Diagnosis    diclofenac 75 MG EC tablet    VOLTAREN    30 tablet    Take 1 tablet (75 mg) by mouth 2 times daily as needed for moderate pain    Acute pain of right shoulder       hydrocortisone 25 MG Suppository    ANUSOL-HC     Place 25 mg rectally        levonorgestrel 20 MCG/24HR IUD    MIRENA     1 each by Intrauterine route once        order for DME     1 Device    Equipment being ordered: ankle brace    Ankle injury, right, initial encounter

## 2018-10-26 NOTE — PATIENT INSTRUCTIONS
Your xrays showed no broken bones or shoulder dislocation.    Symptoms are consistent with impingement which is a problem within the shoulder joint that can lead to stiffness and pain.    Please follow up with orthopedic specialist in 3-5 days. Referral has been placed.    Continue with diclofenac as needed for pain.  May also take additional Tylenol 500mg every 6 hours for pain.  Use heating pad daily as well.  Practice gentle stretching and range of motion exercises so you don't get more stiff.

## 2018-10-26 NOTE — PROGRESS NOTES
Sports Medicine Clinic Visit    PCP: Dallas Parra    Brynn Sprague is a 37 year old female who is seen  in consultation at the request of  Monet Negron PA-C presenting as an AIC with right shoulder and arm pain.  Pain has been present since Monday.  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  Does have some pain that travels up to her neck.    Patient is right hand dominant.     **  Right shoulder pain. Difficulty with overhead arm motions. Believes pain radiates to neck. Has been using hot pack. History of calcific tendonitis. Has wrist pain with cutting.       Injury: overuse     Location of Pain: right shoulder, upper extremity   Duration of Pain: 4 day(s)  Rating of Pain at worst: 10/10  Rating of Pain Currently: 10/10  Symptoms are better with: Nothing  Symptoms are worse with: use, sleeping   Additional Features:   Positive: weakness   Negative: swelling, bruising, popping, grinding, catching, locking, instability, paresthesias, numbness, pain in other joints and systemic symptoms  Other evaluation and/or treatments so far consists of: x rays  Prior History of related problems: HX of similar pain in 2016    Social History: housekeeping    Review of Systems  Musculoskeletal: as above  Remainder of review of systems is negative including constitutional, CV, pulmonary, GI, Skin and Neurologic except as noted in HPI or medical history.    Past Medical History:   Diagnosis Date     Chronic neutropenia (H)     possibly benign essential neutropenia     GERD (gastroesophageal reflux disease)      Left renal mass      Normal pregnancy 2006,08,09,12     Pulmonary nodules      Renal cell carcinoma (H)      Past Surgical History:   Procedure Laterality Date     DAVINCI NEPHRECTOMY PARTIAL Left 1/16/2017    Procedure: DAVINCI NEPHRECTOMY PARTIAL;  Surgeon: Jairo Cross MD;  Location: [facility] OR     INSERT INTRAUTERINE DEVICE  10/16/15     Family History   Problem Relation Age of Onset     Family  "History Negative Mother      Family History Negative Father      Cancer No family hx of      Social History     Social History     Marital status:      Spouse name: Carmen Sahni     Number of children: 4     Years of education: N/A     Occupational History     facilities management [employer]     Social History Main Topics     Smoking status: Never Smoker     Smokeless tobacco: Never Used     Alcohol use No     Drug use: No     Sexual activity: Yes     Partners: Male     Birth control/ protection: IUD     Other Topics Concern     Parent/Sibling W/ Cabg, Mi Or Angioplasty Before 65f 55m? No     Social History Narrative     This document serves as a record of the services and decisions personally performed and made by DO NED Ennis. It was created on their behalf by Santana Deluca, a trained medical scribe. The creation of this document is based the provider's statements to the medical scribe.  Santana Deluca October 26, 2018 10:17 AM    Objective  /62  Ht 1.6 m (5' 3\")  Wt 72.1 kg (159 lb)  BMI 28.17 kg/m2      GENERAL APPEARANCE: healthy, alert and no distress   GAIT: NORMAL  SKIN: no suspicious lesions or rashes  NEURO: Normal strength and tone, mentation intact and speech normal  PSYCH:  mentation appears normal and affect normal/bright  HEENT: no scleral icterus  CV: no extremity edema  RESP: nonlabored breathing      Right Shoulder exam    ROM:        forward flexion 50-60 degrees        abduction 60-70 degrees       internal rotation limited       external rotation 5-10 degrees  AROM above.  Passive > active    Strength:        abduction 5/5       internal rotation 3/5       external rotation 5/5    Impingement testing:        positive (+) Gross        Unable to perform empty can    Skin:       no visible deformities       well perfused       capillary refill brisk    Sensation:        normal sensation over shoulder and upper extremity     Cervical exam  Special Tests:     " neg (-) Spurling bilaterally, both provoke pain on right side of neck          Radiology  Visualized radiographs of right shoulder taken on 10/25/2018, and reviewed the images with the patient.  Impression: no acute abnormality. No clear calcification that had been noted previously.      RIGHT SHOULDER TWO VIEWS      10/25/2018 8:21 PM      INDICATION: Right shoulder pain, impingement symptoms; also has RCC.  Question fragility fx or bony lesions.  Acute pain of right shoulder.     COMPARISON: 12/23/2015.         IMPRESSION: No fracture, dislocation or other acute findings. No  destructive bone lesion.      DIANNE JERRY MD  =============================================================  Reviewed past pertinent radiographs    SHOULDER TWO VIEWS RIGHT  12/23/2015 3:51 PM      HISTORY: Pain in right upper arm. History of falling.     COMPARISON: None.     FINDINGS: Ossified density is seen adjacent to the humeral head on  internal rotation views. This could be a loose joint body or calcific  tendinosis. The acromioclavicular and coracoclavicular distances  appear within normal limits. The subacromial space is maintained.  There is no acute fracture or demonstrated dislocation. There are no  worrisome bony lesions.            IMPRESSION  IMPRESSION: Possible loose joint body versus calcific tendinosis. No  acute osseous abnormality demonstrated.     CASPER GARBER MD      =============================================================  Reviewed past pertinent MRI    MR SHOULDER RIGHT WITHOUT CONTRAST 2/2/2016 10:12 AM      HISTORY: Impingement syndrome of right shoulder.     TECHNIQUE: Axial dual echo T2. Coronal T1. Coronal T2 with and without  fat suppression. Sagittal T2.     COMPARISON: X-ray from 12/23/2015.     FINDINGS:  Osseous Acromion Outlet: There is a flat type I acromion. No anterior  downsloping. Mild lateral acromial downsloping. No subacromial spur.  No significant AC degenerative changes.     Rotator  Cuff: No evidence of rotator cuff tear. There is an area of  decreased density on coronal series 5 image 11 likely corresponding to  the calcification seen in x-ray indicating an area of calcific  tendinitis involving the infraspinatus tendon. There is mild  thickening of the infraspinatus and supraspinatus tendons, consistent  with tendinosis as well. No evidence of rotator cuff tear.     Labral Structures: No definite labral tear identified.     Biceps Tendon: No tear, dislocation, or significant tendinosis.     Osseous and Cartilaginous Structures: Glenohumeral articular cartilage  is intact. Bone marrow signal is unremarkable.     Additional Findings: Small amount of subacromial fluid. No joint  effusion or periarticular cystic lesion. No focal muscle atrophy.         IMPRESSION:  1. No evidence of rotator cuff tear. Probable calcific tendinitis  distal infraspinatus tendon with adjacent tendinosis in the  supraspinatus tendon as well.  2. Small amount of subacromial fluid likely related to mild  subacromial bursitis.     ALEJANDRA VALERO MD    =================================================  Reviewed past pertinent radiographs     XR CERVICAL SPINE 2/3 VWS 8/25/2016 10:38 AM     HISTORY: Neck pain.     COMPARISON: None.     FINDINGS: Cervical alignment is anatomic. Vertebral body heights and  disc spaces are preserved. Prevertebral soft tissues are normal.         IMPRESSION: Normal cervical spine.     ALEJANDRA LOPES MD  Assessment:  1. Acute pain of right shoulder        Plan:  Discussed the assessment with the patient. Likely repetitive motion/overuse. History of calcific tendinitis in this shoulder on prior imaging; also noted history of . Discussed anti-inflammatory medication, rehab, time to start.    Radiologic images reviewed and discussed with patient today  We discussed the following: symptom treatment, activity modification/rest, imaging, rehab, injection therapy, support for the affected area, and  medication.     Following discussion, plan:  Topical Treatments: Ice or Heat as needed   Over the counter medication: Patient's preferred OTC medication as needed   Prescription Medication as directed: Voltaren (Diclofenac). Discussed side effects associated with Voltaren.   Letter written for work restrictions. Can call for updated letter if symptoms improve.   Activity Modification: as discussed  Rehab: Home exercise program given. Offered PT.  Support options shown. Patient declined.   Future consideration for Prednisone if course of Voltaren does not improve symptoms.   Future consideration for Physical Therapy referral. If symptoms do not remit with prednisone course. May call for PT.  Future consideration for a steroid injection of the right shoulder: subacromial space  Future consideration for MRI of the right shoulder if symptoms do not remit  Follow up: 7-10 days if not improving with above, sooner if needed   Questions answered. The patient indicates understanding of these issues and agrees with the plan.    Antonio Gonzalez DO, NED    CC: Monet Negron      Disclaimer: This note consists of symbols derived from keyboarding, dictation and/or voice recognition software. As a result, there may be errors in the script that have gone undetected. Please consider this when interpreting information found in this chart.    The information in this document, created by the medical scribe for me, accurately reflects the services I personally performed and the decisions made by me. I have reviewed and approved this document for accuracy prior to leaving the patient care area.

## 2018-11-06 ENCOUNTER — OFFICE VISIT (OUTPATIENT)
Dept: ORTHOPEDICS | Facility: CLINIC | Age: 38
End: 2018-11-06
Payer: COMMERCIAL

## 2018-11-06 VITALS
DIASTOLIC BLOOD PRESSURE: 70 MMHG | SYSTOLIC BLOOD PRESSURE: 106 MMHG | WEIGHT: 159 LBS | HEIGHT: 63 IN | BODY MASS INDEX: 28.17 KG/M2
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
## 2018-11-06 DIAGNOSIS — M25.511 ACUTE PAIN OF RIGHT SHOULDER: Primary | ICD-10-CM

## 2018-11-06 PROCEDURE — 99213 OFFICE O/P EST LOW 20 MIN: CPT | Performed by: PEDIATRICS

## 2018-11-06 NOTE — LETTER
11/6/2018         RE: Brynn Sprague  [address]  [place] [address]        Dear Colleague,    Thank you for referring your patient, Brynn Sprague, to the [facility]. Please see a copy of my visit note below.    Sports Medicine Clinic Visit    PCP: Dallas Parra    Brynn Sprague is a 38 year old female who is seen in f/u up for Acute pain of right shoulder. Since last visit on 10/26/2018 patient has had improvement.  She does feel the diclofenac and the exercises have been helpful.  She still does have pain with lifting her arm overhead.  She does feel pain with overuse of the right shoulder.    Pain with reaching behind, like for the seatbelt.  Does hear a click in her shoulder when lifting overhead.    Has noticed it is difficult to open her hand first thing in the morning.  Pain is more pronounced in her right ring finger.      **  Reports improvement of symptoms. Increased ROM. Pain begins in neck, radiates into anterior elbow and to right ring finger. Worse in mornings, wakes up with clenched fist every morning, unable to open. Reports sound coming from neck.          Review of Systems  All other systems reviewed and are negative unless noted above.    Past Medical History:   Diagnosis Date     Chronic neutropenia (H)     possibly benign essential neutropenia     GERD (gastroesophageal reflux disease)      Left renal mass      Normal pregnancy 2006,08,09,12     Pulmonary nodules      Renal cell carcinoma (H)      Past Surgical History:   Procedure Laterality Date     DAVINCI NEPHRECTOMY PARTIAL Left 1/16/2017    Procedure: DAVINCI NEPHRECTOMY PARTIAL;  Surgeon: Jairo Cross MD;  Location: [facility] OR     INSERT INTRAUTERINE DEVICE  10/16/15     This document serves as a record of the services and decisions personally performed and made by DO NED Ennis. It was created on their behalf by Santana Deluca, a trained medical scribe. The creation of this  "document is based the provider's statements to the medical scribe.  Santana Deluca November 6, 2018 2:15 PM    Objective  /70  Ht 5' 3\" (1.6 m)  Wt 159 lb (72.1 kg)  BMI 28.17 kg/m2    GENERAL APPEARANCE: healthy, alert and no distress   GAIT: NORMAL  SKIN: no suspicious lesions or rashes  NEURO: Normal strength and tone, mentation intact and speech normal  PSYCH:  mentation appears normal and affect normal/bright  HEENT: no scleral icterus  CV: no extremity edema  RESP: nonlabored breathing    Exam  Right Shoulder exam    ROM: Forward flexion 110 degrees  Abduction lacking 5-10 degrees  External rotation fully intact  Internal rotation fully intact    Impingement testing:      positive (+) Gross       neg (-) empty can, patient notes popping sensation in shoulder    Skin:      no visible deformities       well perfused       capillary refill brisk    Sensation:      normal sensation over shoulder and upper extremity     Cervical Spine Exam    Range of Motion: Full forward flexion  Full extension, mild pain in midline  Lateral flexion to left fully intact with tightness on right   Lateral flexion to right fully intact    Inspection:       No visible deformity        normal lordotic curvature maintained    Special Tests:     neg (-) Spurling    Skin:     well perfused       capillary refill brisk    Lymphatics:      no edema noted in the upper extremities       Right Elbow exam:  Special tests: Ulnar tinel negative    Right Hand/Wrist  No pain with compression of guyan's canal          Radiology  Reviewed past pertinent radiograph taken on 10/25/2018     RIGHT SHOULDER TWO VIEWS      10/25/2018 8:21 PM       INDICATION: Right shoulder pain, impingement symptoms; also has RCC.  Question fragility fx or bony lesions.  Acute pain of right shoulder.      COMPARISON: 12/23/2015.          IMPRESSION: No fracture, dislocation or other acute findings. No  destructive bone lesion.       DIANNE JERRY, " MD  =============================================================  Reviewed past pertinent radiographs 12/23/2015     SHOULDER TWO VIEWS RIGHT  12/23/2015 3:51 PM       HISTORY: Pain in right upper arm. History of falling.      COMPARISON: None.      FINDINGS: Ossified density is seen adjacent to the humeral head on  internal rotation views. This could be a loose joint body or calcific  tendinosis. The acromioclavicular and coracoclavicular distances  appear within normal limits. The subacromial space is maintained.  There is no acute fracture or demonstrated dislocation. There are no  worrisome bony lesions.             IMPRESSION  IMPRESSION: Possible loose joint body versus calcific tendinosis. No  acute osseous abnormality demonstrated.      CASPER GARBER MD      =================================================  Reviewed past pertinent radiographs      XR CERVICAL SPINE 2/3 VWS 8/25/2016 10:38 AM      HISTORY: Neck pain.      COMPARISON: None.      FINDINGS: Cervical alignment is anatomic. Vertebral body heights and  disc spaces are preserved. Prevertebral soft tissues are normal.          IMPRESSION: Normal cervical spine.      ALEJANDRA LOPES MD     =================================================  Reviewed past pertinent MRI     MR SHOULDER RIGHT WITHOUT CONTRAST 2/2/2016 10:12 AM       HISTORY: Impingement syndrome of right shoulder.      TECHNIQUE: Axial dual echo T2. Coronal T1. Coronal T2 with and without  fat suppression. Sagittal T2.      COMPARISON: X-ray from 12/23/2015.      FINDINGS:  Osseous Acromion Outlet: There is a flat type I acromion. No anterior  downsloping. Mild lateral acromial downsloping. No subacromial spur.  No significant AC degenerative changes.      Rotator Cuff: No evidence of rotator cuff tear. There is an area of  decreased density on coronal series 5 image 11 likely corresponding to  the calcification seen in x-ray indicating an area of calcific  tendinitis involving the  infraspinatus tendon. There is mild  thickening of the infraspinatus and supraspinatus tendons, consistent  with tendinosis as well. No evidence of rotator cuff tear.      Labral Structures: No definite labral tear identified.      Biceps Tendon: No tear, dislocation, or significant tendinosis.      Osseous and Cartilaginous Structures: Glenohumeral articular cartilage  is intact. Bone marrow signal is unremarkable.      Additional Findings: Small amount of subacromial fluid. No joint  effusion or periarticular cystic lesion. No focal muscle atrophy.          IMPRESSION:  1. No evidence of rotator cuff tear. Probable calcific tendinitis  distal infraspinatus tendon with adjacent tendinosis in the  supraspinatus tendon as well.  2. Small amount of subacromial fluid likely related to mild  subacromial bursitis.      ALEJANDRA VALERO MD    Assessment:  1. Acute pain of right shoulder    2. Overuse injury        Plan:  Discussed the assessment with the patient.  Most consistent with overuse at the shoulder, with some radiating symptoms.  Will start with rehab approach, monitor for radiating pain, sensation changes.  Radiologic images reviewed and discussed with patient today  We discussed the following: symptom treatment, activity modification/rest, imaging, rehab, injection therapy and medication.     Following discussion, plan:  Topical Treatments: Ice or Heat as needed   Over the counter medication: Patient's preferred OTC medication as needed.  Activity Modification: as discussed  Rehab: Physical Therapy: Referral placed.  Plan physical therapy next.  Letter for work written  Future consideration of steroid injection of the right shoulder: subacromial space pending Physical Therapy course  Follow up: 3-4 weeks if symptoms worsen or fail to improve  Questions answered. The patient indicates understanding of these issues and agrees with the plan.    Antonio Gonzalez DO, CAQ          Disclaimer: This note consists of  symbols derived from keyboarding, dictation and/or voice recognition software. As a result, there may be errors in the script that have gone undetected. Please consider this when interpreting information found in this chart.    The information in this document, created by the medical scribe for me, accurately reflects the services I personally performed and the decisions made by me. I have reviewed and approved this document for accuracy prior to leaving the patient care area.     Again, thank you for allowing me to participate in the care of your patient.        Sincerely,        Antonio Gonzalez, DO

## 2018-11-06 NOTE — MR AVS SNAPSHOT
After Visit Summary   11/6/2018    Brynn Sprague    MRN: [id number]           Patient Information     Date Of Birth          1980        Visit Information        Provider Department      11/6/2018 2:20 PM Antonio Gonzalez,  [facility] [place]        Today's Diagnoses     Acute pain of right shoulder    -  1    Overuse injury          Care Instructions    Physical Therapy referral placed    Letter for work written          Follow-ups after your visit        Additional Services     [facility] PT, HAND, AND CHIROPRACTIC REFERRAL       Physical Therapy, Hand Therapy and Chiropractic Care are available through:  *[facility]  *Hand Therapy (Occupational Therapy or Physical Therapy)  *[facility]    Call one number to schedule at any of the above locations: [phone].    Physical therapy, Hand therapy and/or Chiropractic care has been recommended by your physician as an excellent treatment option to reduce pain and help people return to normal activities, including sports.  Therapy and/or chiropractic care services are a great complement or alternative to expensive and invasive surgery, injections, or long-term use of prescription medications. The primary goal is to identify the underlying problem and provide you the tools to manage your condition on your own.     Please be aware that coverage of these services is subject to the terms and limitations of your health insurance plan.  Call member services at your health plan with any benefit or coverage questions.      Please bring the following to your appointment:  *Your personal calendar for scheduling future appointments  *Comfortable clothing                  Follow-up notes from your care team     Return in about 4 weeks (around 12/4/2018), or if symptoms worsen or fail to improve.      Your next 10 appointments already scheduled     Oct 10, 2019  7:00 AM CDT   LAB with  LAB    [facility] ([facility])    [address] [address]   [phone]           Please do not eat 10-12 hours before your appointment if you are coming in fasting for labs on lipids, cholesterol, or glucose (sugar). This does not apply to pregnant women. Water, hot tea and black coffee (with nothing added) are okay. Do not drink other fluids, diet soda or chew gum.            Oct 10, 2019  7:20 AM CDT   CT ABDOMEN PELVIS W CONTRAST with UCCT2   [facility] CT ([facility])    [address] [address]   [phone]           How do I prepare for my exam? (Food and drink instructions) To prepare: Do not eat or drink for 2 hours before your exam. If you need to take medicine, you may take it with small sips of water. (We may ask you to take liquid medicine as well.)  How do I prepare for my exam? (Other instructions) Please arrive 30 minutes early for your CT.  Once in the department you might be asked to drink water 15-20 minutes prior to your exam.  If indicated you may be asked to drink an oral contrast in advance of your CT.  If this is the case, the imaging team will let you know or be in contact with you prior to your appointment  Patients over 70 or patients with diabetes or kidney problems: If you haven t had a blood test (creatinine test) within the last 30 days, the Cardiologist/Radiologist may require you to get this test prior to your exam.  If you have diabetes:  Continue to take your metformin medication on the day of your exam  What should I wear: Please wear loose clothing, such as a sweat suit or jogging clothes. Avoid snaps, zippers and other metal. We may ask you to undress and put on a hospital gown.  How long does the exam take: Most scans take less than 20 minutes.  What should I bring: Please bring any scans or X-rays taken at other hospitals, if similar tests were  done. Also bring a list of your medicines, including vitamins, minerals and over-the-counter drugs. It is safest to leave personal items at home.  Do I need a : No  is needed.  What do I need to tell my doctor? Be sure to tell your doctor: * If you have any allergies. * If there s any chance you are pregnant. * If you are breastfeeding.  What should I do after the exam: No restrictions, You may resume normal activities.  What is this test: A CT (computed tomography) scan is a series of pictures that allows us to look inside your body. The scanner creates images of the body in cross sections, much like slices of bread. This helps us see any problems more clearly. You may receive contrast (X-ray dye) before or during your scan. You will be asked to drink the contrast.  Who should I call with questions: If you have any questions, please call the Imaging Department where you will have your exam. Directions, parking instructions, and other information is available on our website, [employer]/imaging.            Oct 10, 2019  7:40 AM CDT   XR CHEST 2 VIEWS with UCXR1   [facility] ([facility])    [address]  [facility] [address] [phone]           How do I prepare for my exam? (Food and drink instructions) No Food and Drink Restrictions.  How do I prepare for my exam? (Other instructions) You do not need to do anything special for this exam.  What should I wear: Wear comfortable clothes.  How long does the exam take: Most scans take less than 5 minutes.  What should I bring: Bring a list of your medicines, including vitamins, minerals and over-the-counter drugs. It is safest to leave personal items at home.  Do I need a :  No  is needed.  What do I need to tell my doctor: Tell your doctor if there s any chance you are pregnant.  What should I do after the exam: No restrictions, You may resume normal activities.  What is this  "test: An image of a specific body part shown in shades of black and white.  Who should I call with questions: If you have any questions, please call the Imaging Department where you will have your exam. Directions, parking instructions, and other information is available on our website, [email or web address].            Oct 10, 2019  8:00 AM CDT   (Arrive by 7:45 AM)   Return Visit with Jairo Cross MD   [facility] ([facility] [place])    [address] [place] [address]  4th [facility] [address] [phone]              Future tests that were ordered for you today     Open Future Orders        Priority Expected Expires Ordered    [facility] PT, HAND, AND CHIROPRACTIC REFERRAL Routine  11/6/2019 11/6/2018            Who to contact     If you have questions or need follow up information about today's clinic visit or your schedule please contact [facility] [place] directly at [phone].  Normal or non-critical lab and imaging results will be communicated to you by MyChart, letter or phone within 4 business days after the clinic has received the results. If you do not hear from us within 7 days, please contact the clinic through MyChart or phone. If you have a critical or abnormal lab result, we will notify you by phone as soon as possible.  Submit refill requests through [employer] or call your pharmacy and they will forward the refill request to us. Please allow 3 business days for your refill to be completed.          Additional Information About Your Visit        Care EveryWhere ID     This is your Care EveryWhere ID. This could be used by other organizations to access your [facility] medical records  [id number]        Your Vitals Were     Height BMI (Body Mass Index)                5' 3\" (1.6 m) 28.17 kg/m2           Blood Pressure from Last 3 Encounters:   11/06/18 106/70   10/26/18 100/62   10/25/18 107/62    Weight " from Last 3 Encounters:   11/06/18 159 lb (72.1 kg)   10/26/18 159 lb (72.1 kg)   10/25/18 159 lb (72.1 kg)               Primary Care Provider Office Phone # Fax #    Dallas Parra PA-C [phone] [address]  [place] [address]        Equal Access to Services     [facility]: Hadii aad ku hadasho Soomaali, waaxda luqadaha, qaybta kaalmada adeegyada, waxay idiin hayaan adeeg kharash la'aan ah. So [facility] [phone].    ATENCIÓN: Si moisela natalia, tiene a olvera disposición servicios gratuitos de asistencia lingüística. Llame al [phone].    We comply with applicable federal civil rights laws and [place] laws. We do not discriminate on the basis of race, color, national origin, age, disability, sex, sexual orientation, or gender identity.            Thank you!     Thank you for choosing [facility]  for your care. Our goal is always to provide you with excellent care. Hearing back from our patients is one way we can continue to improve our services. Please take a few minutes to complete the written survey that you may receive in the mail after your visit with us. Thank you!             Your Updated Medication List - Protect others around you: Learn how to safely use, store and throw away your medicines at www.disposemymeds.org.          This list is accurate as of 11/6/18  2:50 PM.  Always use your most recent med list.                   Brand Name Dispense Instructions for use Diagnosis    diclofenac 75 MG EC tablet    VOLTAREN    30 tablet    Take 1 tablet (75 mg) by mouth 2 times daily as needed for moderate pain    Acute pain of right shoulder       hydrocortisone 25 MG Suppository    ANUSOL-HC     Place 25 mg rectally        levonorgestrel 20 MCG/24HR IUD    MIRENA     1 each by Intrauterine route once        order for DME     1 Device    Equipment being ordered: ankle brace    Ankle injury, right, initial encounter

## 2018-11-06 NOTE — LETTER
[facility] [place]  [address]  [address]  [place] [address]  Phone: [phone]  Fax: [phone]      November 6, 2018      RE: Brynn Sprague  [address]  [place] [address]        To whom it may concern:    Brynn Sprague is under my professional care. The employee is ABLE to return to work next scheduled work date.    When the patient returns to work, the following restrictions apply for 4 weeks:  Reach Above Shoulders: Not at all (0 hours) on right  Best work height mid chest to mid thigh, keeping work close to body.        Sincerely,            Antonio VIRK.

## 2018-11-20 ENCOUNTER — THERAPY VISIT (OUTPATIENT)
Dept: PHYSICAL THERAPY | Facility: CLINIC | Age: 38
End: 2018-11-20
Attending: PEDIATRICS
Payer: OTHER MISCELLANEOUS

## 2018-11-20 DIAGNOSIS — M25.511 ACUTE PAIN OF RIGHT SHOULDER: ICD-10-CM
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
## 2018-11-20 PROCEDURE — 97162 PT EVAL MOD COMPLEX 30 MIN: CPT | Mod: GP | Performed by: PHYSICAL THERAPIST

## 2018-11-20 PROCEDURE — 97110 THERAPEUTIC EXERCISES: CPT | Mod: GP | Performed by: PHYSICAL THERAPIST

## 2018-11-20 NOTE — PROGRESS NOTES
[facility] Initial Evaluation  Subjective:  Patient is a 38 year old female presenting with rehab right shoulder hpi. The history is provided by the patient and medical records. The history is limited by a language barrier. No  was used.   Brynn Sprague is a 38 year old female with a right shoulder condition.  Condition occurred with:  Repetition/overuse (approximately 2-3 weeks ago, began experiencing pain in right forearm and elbow; symptoms eventurally spread to right shoulder and neck.  Patient was referred to PT for this problem 11/06/18).  Condition occurred: at work.  This is a recurrent condition  PMH is significant for similar episode of pain in 2016  .    Patient reports pain:  Lateral.  Radiates to:  Cervical, elbow, lower arm and hand (4th digit).  Pain is described as aching and sharp and is intermittent and reported as 8/10.  Associated symptoms:  Loss of motion/stiffness, loss of strength and other (cracking). Pain is worse during the day and worse in the P.M..  Symptoms are exacerbated by using arm at shoulder level, using arm overhead, using arm behind back, lying on extremity, carrying, lifting and other (RUE usage to wipe countertops or tables) and relieved by analgesics and other (hot shower, massaging arm).  Since onset symptoms are gradually improving.  Special tests:  X-ray (see EMR).      General health as reported by patient is good.  Pertinent medical history includes:  Cancer (Renal).  Medical allergies: yes (see EMR).  Surgical history: kidney surgery.  Current medications:  Pain medication.  Current occupation is [employer].  Patient is working in normal job with restrictions.  Primary job tasks include:  Repetitive tasks, lifting and prolonged standing.    Barriers: requires occasional assistance from family members for dressing, washing hair.    Red flags:  Pain at night/rest.                        Objective:  System             "  Cervical/Thoracic Evaluation    AROM:  AROM Cervical:    Flexion:            WNL (\"tight\")   Extension:       Mod loss (neck pain)   Rotation:         Left: WNL (right neck pain)      Right: WNL  Side Bend:      Left: Mild loss (neck pain)     Right:  WNL      Headaches: none  Cervical Myotomes:          C5 (Deltoid):  Left: 3-    Right: 5  C6 (Biceps):  Left: 5    Right: 5  C7 (Triceps):  Left: 5    Right: 5  C8 (Thumb Ext): Left: 5    Right: 5  T1 (Intrinsics): Left: 5    Right: 5  DTR's:  not assessed                           Shoulder Evaluation:  ROM:  AROM:    Flexion:  Left:  130    Right:  95*  Extension: Left: 60Right: 40*  Abduction:  Left: 132   Right:  78*    Internal Rotation:  Left:  WNL    Right:  T12*  External Rotation:  Left:  WNL    Right:  85*                PROM:    Flexion:  Right: 120*      Abduction:  Right:  140*    Internal Rotation:  Right:  66*  External Rotation:  Right:  88*                    Strength:    Flexion: Right: 3-/5      Pain:  +    Abduction:  Right: 3-/5      Pain:+    Internal Rotation:  Left:5/5     Pain:    Right: 4-/5      Pain:+  External Rotation:   Left:5/5     Pain:   Right:4-/5      Pain:+            Stability Testing:  not assessed      Special Tests:      Right shoulder positive for the following special tests:Impingement  Palpation:  Palpation assessed shoulder: Hypertonicity and tenderness R>L upper trap & levator.                                     Repeated Movement Exam Cervical Spine  Repeated retraction in sitting:  Produces neck pain/better/NE on shoulder ROM  Retraction/ext in sitting:  Produces neck pain/better/incr cervical ROM but NE on shoulder ROM    Repeated Movement Exam Right Shoulder:  Extension in standing with PT overpressure: produces shoulder pain/NW/slight incr ROM  Extension in standing with self overpressure 2x10 at counter:  Produces shoulder pain/NW/slight incr ROM    General     ROS    Assessment/Plan:    Patient is a 38 year old " female with right shoulder complaints.  Appears to have cervical component also contributing to symptoms.  Will continue to assess at subsequent visits.  Patient has the following significant findings with corresponding treatment plan.                Diagnosis 1:  Right Shoulder Pain   Pain -  self management, education, directional preference exercise and home program  Decreased ROM/flexibility - therapeutic exercise  Decreased strength - therapeutic exercise  Decreased function - home program  Impaired posture - neuro re-education    Therapy Evaluation Codes:   1) History comprised of:   Personal factors that impact the plan of care:      Language and Past/current experiences.    Comorbidity factors that impact the plan of care are:      None.     Medications impacting care: Pain.  2) Examination of Body Systems comprised of:   Body structures and functions that impact the plan of care:      Cervical spine and Shoulder.   Activity limitations that impact the plan of care are:      Bathing, Dressing, Lifting, Sleeping and reaching, working.  3) Clinical presentation characteristics are:   Evolving/Changing.  4) Decision-Making    Moderate complexity using standardized patient assessment instrument and/or measureable assessment of functional outcome.  Cumulative Therapy Evaluation is: Moderate complexity.    Previous and current functional limitations:  (See Goal Flow Sheet for this information)    Short term and Long term goals: (See Goal Flow Sheet for this information)     Communication ability:  Patient appears to be able to clearly communicate and understand verbal and written communication and follow directions correctly.  Treatment Explanation - The following has been discussed with the patient:    RX ordered/plan of care  Anticipated outcomes  Possible risks and side effects  This patient would benefit from PT intervention to resume normal activities.   Rehab potential is good.    Frequency:  1 X week, once  daily  Duration:  for 6 weeks  Discharge Plan:  Achieve all LTG.  Independent in home treatment program.  Reach maximal therapeutic benefit.    Please refer to the daily flowsheet for treatment today, total treatment time and time spent performing 1:1 timed codes.

## 2018-11-20 NOTE — MR AVS SNAPSHOT
After Visit Summary   11/20/2018    Brynn Sprague    MRN: [id number]           Patient Information     Date Of Birth          1980        Visit Information        Provider Department      11/20/2018 2:30 PM Tiny Zhou, [place] [facility] [place] [facility]        Today's Diagnoses     Acute pain of right shoulder        Overuse injury           Follow-ups after your visit        Your next 10 appointments already scheduled     Nov 27, 2018  2:30 PM CST   LORIE Extremity with Tiny Zhou PT   [facility] [place] [facility] ([facility] [place])    [address]  [place] [address]   [phone]            Dec 04, 2018  2:30 PM CST   LORIE Extremity with Tiny Zhou PT   [facility] [place] [facility] ([facility] [place])    [address]  [place] [address]   [phone]            Oct 10, 2019  7:00 AM CDT   LAB with  LAB   [facility] ([facility])    [address] [place] [address] [phone]           Please do not eat 10-12 hours before your appointment if you are coming in fasting for labs on lipids, cholesterol, or glucose (sugar). This does not apply to pregnant women. Water, hot tea and black coffee (with nothing added) are okay. Do not drink other fluids, diet soda or chew gum.            Oct 10, 2019  7:20 AM CDT   CT ABDOMEN PELVIS W CONTRAST with UCCT2   [facility] ([facility])    [address] [place] [address] [phone]           How do I prepare for my exam? (Food and drink instructions) To prepare: Do not eat or drink for 2 hours before your exam. If you need to take medicine, you may take it with small sips of water. (We may ask you to take liquid medicine as well.)  How do I prepare for my exam? (Other instructions) Please arrive 30 minutes early for your CT.   Once in the department you might be asked to drink water 15-20 minutes prior to your exam.  If indicated you may be asked to drink an oral contrast in advance of your CT.  If this is the case, the imaging team will let you know or be in contact with you prior to your appointment  Patients over 70 or patients with diabetes or kidney problems: If you haven t had a blood test (creatinine test) within the last 30 days, the Cardiologist/Radiologist may require you to get this test prior to your exam.  If you have diabetes:  Continue to take your metformin medication on the day of your exam  What should I wear: Please wear loose clothing, such as a sweat suit or jogging clothes. Avoid snaps, zippers and other metal. We may ask you to undress and put on a hospital gown.  How long does the exam take: Most scans take less than 20 minutes.  What should I bring: Please bring any scans or X-rays taken at other hospitals, if similar tests were done. Also bring a list of your medicines, including vitamins, minerals and over-the-counter drugs. It is safest to leave personal items at home.  Do I need a : No  is needed.  What do I need to tell my doctor? Be sure to tell your doctor: * If you have any allergies. * If there s any chance you are pregnant. * If you are breastfeeding.  What should I do after the exam: No restrictions, You may resume normal activities.  What is this test: A CT (computed tomography) scan is a series of pictures that allows us to look inside your body. The scanner creates images of the body in cross sections, much like slices of bread. This helps us see any problems more clearly. You may receive contrast (X-ray dye) before or during your scan. You will be asked to drink the contrast.  Who should I call with questions: If you have any questions, please call the Imaging Department where you will have your exam. Directions, parking instructions, and other information is available on our website,  [email or web address].            Oct 10, 2019  7:40 AM CDT   XR CHEST 2 VIEWS with UCXR1   [facility] ([facility])    [address] [facility] [address] [phone]           How do I prepare for my exam? (Food and drink instructions) No Food and Drink Restrictions.  How do I prepare for my exam? (Other instructions) You do not need to do anything special for this exam.  What should I wear: Wear comfortable clothes.  How long does the exam take: Most scans take less than 5 minutes.  What should I bring: Bring a list of your medicines, including vitamins, minerals and over-the-counter drugs. It is safest to leave personal items at home.  Do I need a :  No  is needed.  What do I need to tell my doctor: Tell your doctor if there s any chance you are pregnant.  What should I do after the exam: No restrictions, You may resume normal activities.  What is this test: An image of a specific body part shown in shades of black and white.  Who should I call with questions: If you have any questions, please call the Imaging Department where you will have your exam. Directions, parking instructions, and other information is available on our website, [email or web address].            Oct 10, 2019  8:00 AM CDT   (Arrive by 7:45 AM)   Return Visit with Jairo Cross MD   [facility] ([facility])    [address] [place] [id number] [phone]              Who to contact     If you have questions or need follow up information about today's clinic visit or your schedule please contact [facility] directly at [phone].  Normal or non-critical lab and imaging results will be communicated to you by MyChart, letter or phone within 4 business days after the clinic has received the results. If you do not hear from us  within 7 days, please contact the clinic through [facility] or phone. If you have a critical or abnormal lab result, we will notify you by phone as soon as possible.  Submit refill requests through [employer] or call your pharmacy and they will forward the refill request to us. Please allow 3 business days for your refill to be completed.          Additional Information About Your Visit        Care EveryWhere ID     This is your Care EveryWhere ID. This could be used by other organizations to access your [facility] medical records  [id number]         Blood Pressure from Last 3 Encounters:   11/06/18 106/70   10/26/18 100/62   10/25/18 107/62    Weight from Last 3 Encounters:   11/06/18 72.1 kg (159 lb)   10/26/18 72.1 kg (159 lb)   10/25/18 72.1 kg (159 lb)              We Performed the Following     HC PT EVAL, MODERATE COMPLEXITY     LORIE INITIAL EVAL REPORT     [facility]ORIE PT, HAND, AND CHIROPRACTIC REFERRAL     THERAPEUTIC EXERCISES        Primary Care Provider Office Phone # Fax #    Dallas Parra PA-C [phone]       [address] [place] [address]        Equal Access to Services     [facility] AH: Hadii aad ku hadasho Soomaali, waaxda luqadaha, qaybta kaalmada adeegyada, waxay idiin hayaan simone neumann . So [facility] [phone].    ATENCIÓN: Si habla español, tiene a olvera disposición servicios gratuitos de asistencia lingüística. [facility] [phone].    We comply with applicable federal civil rights laws and [place] laws. We do not discriminate on the basis of race, color, national origin, age, disability, sex, sexual orientation, or gender identity.            Thank you!     Thank you for choosing [facility]  for your care. Our goal is always to provide you with excellent care. Hearing back from our patients is one way we can continue to improve our services. Please take a few minutes to complete the written survey that you may receive in the mail after your  visit with us. Thank you!             Your Updated Medication List - Protect others around you: Learn how to safely use, store and throw away your medicines at www.disposemymeds.org.          This list is accurate as of 11/20/18  4:55 PM.  Always use your most recent med list.                   Brand Name Dispense Instructions for use Diagnosis    diclofenac 75 MG EC tablet    VOLTAREN    30 tablet    Take 1 tablet (75 mg) by mouth 2 times daily as needed for moderate pain    Acute pain of right shoulder       hydrocortisone 25 MG Suppository    ANUSOL-HC     Place 25 mg rectally        levonorgestrel 20 MCG/24HR IUD    MIRENA     1 each by Intrauterine route once        order for DME     1 Device    Equipment being ordered: ankle brace    Ankle injury, right, initial encounter

## 2018-11-27 ENCOUNTER — THERAPY VISIT (OUTPATIENT)
Dept: PHYSICAL THERAPY | Facility: CLINIC | Age: 38
End: 2018-11-27
Payer: OTHER MISCELLANEOUS

## 2018-11-27 DIAGNOSIS — M25.511 ACUTE PAIN OF RIGHT SHOULDER: ICD-10-CM

## 2018-11-27 PROCEDURE — 97110 THERAPEUTIC EXERCISES: CPT | Mod: GP | Performed by: PHYSICAL THERAPIST

## 2018-11-27 PROCEDURE — 97112 NEUROMUSCULAR REEDUCATION: CPT | Mod: GP | Performed by: PHYSICAL THERAPIST

## 2018-12-04 ENCOUNTER — THERAPY VISIT (OUTPATIENT)
Dept: PHYSICAL THERAPY | Facility: CLINIC | Age: 38
End: 2018-12-04
Payer: OTHER MISCELLANEOUS

## 2018-12-04 DIAGNOSIS — M25.511 ACUTE PAIN OF RIGHT SHOULDER: ICD-10-CM

## 2018-12-04 PROCEDURE — 97110 THERAPEUTIC EXERCISES: CPT | Mod: GP | Performed by: PHYSICAL THERAPIST

## 2018-12-04 PROCEDURE — 97112 NEUROMUSCULAR REEDUCATION: CPT | Mod: GP | Performed by: PHYSICAL THERAPIST

## 2018-12-10 ENCOUNTER — OFFICE VISIT (OUTPATIENT)
Dept: ORTHOPEDICS | Facility: CLINIC | Age: 38
End: 2018-12-10
Payer: OTHER MISCELLANEOUS

## 2018-12-10 VITALS
BODY MASS INDEX: 28.17 KG/M2 | DIASTOLIC BLOOD PRESSURE: 68 MMHG | HEIGHT: 63 IN | WEIGHT: 159 LBS | SYSTOLIC BLOOD PRESSURE: 104 MMHG
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
## 2018-12-10 DIAGNOSIS — M25.511 ACUTE PAIN OF RIGHT SHOULDER: Primary | ICD-10-CM

## 2018-12-10 PROCEDURE — 99213 OFFICE O/P EST LOW 20 MIN: CPT | Performed by: PEDIATRICS

## 2018-12-10 ASSESSMENT — MIFFLIN-ST. JEOR: SCORE: 1370.35

## 2018-12-10 NOTE — LETTER
12/10/2018         RE: Brynn Sprague  [address]  [place] [address]        Dear Colleague,    Thank you for referring your patient, Brynn Sprague, to the [facility] [place]. Please see a copy of my visit note below.    Sports Medicine Clinic Visit    PCP: Dallas Parra    Brynn Sprague is a 38 year old female who is seen in f/u up for    Acute pain of right shoulder  Overuse injury. Since last visit on 11/6/2018 patient has had improvement in her shoulder.  Does feel that PT has been helpful.  She does still have some pain with IR and with lifting.  Does have pain if she overuses her shoulder.    She has been working, but would need an update for W/C/.      **  Patient states she is feeling better. Patient is continuing physical therapy. She is requesting an updated letter for work. Patient does have pain at rest in her shoulder. Patient has been driving. She has mild pain in her fingers when walking has improved since last visit. Patient reports the exercises she has been doing in physical therapy is helping a lot for her back pain as well.     Review of Systems  All other systems reviewed and are negative unless noted above.    Past Medical History:   Diagnosis Date     Chronic neutropenia (H)     possibly benign essential neutropenia     GERD (gastroesophageal reflux disease)      Left renal mass      Normal pregnancy 2006,08,09,12     Pulmonary nodules      Renal cell carcinoma (H)      Past Surgical History:   Procedure Laterality Date     DAVINCI NEPHRECTOMY PARTIAL Left 1/16/2017    Procedure: DAVINCI NEPHRECTOMY PARTIAL;  Surgeon: Jairo Cross MD;  Location: [facility] OR     INSERT INTRAUTERINE DEVICE  10/16/15     This document serves as a record of the services and decisions personally performed and made by Dr. Antonio Gonzalez,  [facility]. It was created on his behalf by Loan Ramos, a trained medical scribe. The creation of this document is based the  "provider's statements to the medical scribe.    Giovanniibjaleel Ramos 2:39 PM 12/10/2018    Objective  /68   Ht 1.6 m (5' 3\")   Wt 72.1 kg (159 lb)   BMI 28.17 kg/m       GENERAL APPEARANCE: healthy, alert and no distress   GAIT: NORMAL  SKIN: no suspicious lesions or rashes  NEURO: Normal strength and tone, mentation intact and speech normal  PSYCH:  mentation appears normal and affect normal/bright  HEENT: no scleral icterus  CV: no extremity edema  RESP: nonlabored breathing    Exam  Right Shoulder exam    ROM:        forward flexion 135-140 degrees       abduction lacking 10-20 degrees compared to left       internal rotation no change in pain       external rotation pain at end range of external rotation    Impingement testing:      Pain with Gross       Pain with empty can    Skin:      no visible deformities       well perfused       capillary refill brisk    Sensation:      normal sensation over shoulder and upper extremity     Radiology  Prior imaging:    RIGHT SHOULDER TWO VIEWS      10/25/2018 8:21 PM      INDICATION: Right shoulder pain, impingement symptoms; also has RCC.  Question fragility fx or bony lesions.  Acute pain of right shoulder.     COMPARISON: 12/23/2015.         IMPRESSION: No fracture, dislocation or other acute findings. No  destructive bone lesion.      DIANNE JERRY MD  =============================================================    SHOULDER TWO VIEWS RIGHT  12/23/2015 3:51 PM      HISTORY: Pain in right upper arm. History of falling.     COMPARISON: None.     FINDINGS: Ossified density is seen adjacent to the humeral head on  internal rotation views. This could be a loose joint body or calcific  tendinosis. The acromioclavicular and coracoclavicular distances  appear within normal limits. The subacromial space is maintained.  There is no acute fracture or demonstrated dislocation. There are no  worrisome bony lesions.            IMPRESSION  IMPRESSION: Possible loose " joint body versus calcific tendinosis. No  acute osseous abnormality demonstrated.     CASPER GARBER MD      =============================================================    MR SHOULDER RIGHT WITHOUT CONTRAST 2/2/2016 10:12 AM      HISTORY: Impingement syndrome of right shoulder.     TECHNIQUE: Axial dual echo T2. Coronal T1. Coronal T2 with and without  fat suppression. Sagittal T2.     COMPARISON: X-ray from 12/23/2015.     FINDINGS:  Osseous Acromion Outlet: There is a flat type I acromion. No anterior  downsloping. Mild lateral acromial downsloping. No subacromial spur.  No significant AC degenerative changes.     Rotator Cuff: No evidence of rotator cuff tear. There is an area of  decreased density on coronal series 5 image 11 likely corresponding to  the calcification seen in x-ray indicating an area of calcific  tendinitis involving the infraspinatus tendon. There is mild  thickening of the infraspinatus and supraspinatus tendons, consistent  with tendinosis as well. No evidence of rotator cuff tear.     Labral Structures: No definite labral tear identified.     Biceps Tendon: No tear, dislocation, or significant tendinosis.     Osseous and Cartilaginous Structures: Glenohumeral articular cartilage  is intact. Bone marrow signal is unremarkable.     Additional Findings: Small amount of subacromial fluid. No joint  effusion or periarticular cystic lesion. No focal muscle atrophy.         IMPRESSION:  1. No evidence of rotator cuff tear. Probable calcific tendinitis  distal infraspinatus tendon with adjacent tendinosis in the  supraspinatus tendon as well.  2. Small amount of subacromial fluid likely related to mild  subacromial bursitis.     ALEJANDRA VALERO MD    =================================================    XR CERVICAL SPINE 2/3 VWS 8/25/2016 10:38 AM     HISTORY: Neck pain.     COMPARISON: None.     FINDINGS: Cervical alignment is anatomic. Vertebral body heights and  disc spaces are preserved.  Prevertebral soft tissues are normal.         IMPRESSION: Normal cervical spine.     ALEJANDRA LOPES MD      Assessment:  1. Acute pain of right shoulder    2. Overuse injury        Plan:  Discussed the assessment with the patient. Previous calcific tendinitis on imaging.  ROM improving with time. Improved pain as well. Plan to continue with PT for now. We also discussed potential for updating MRI, subacromial steroid injection. Hold with additional intervention for now, given improving.    We discussed the following: symptom treatment, activity modification/rest, rehab and injection therapy. Following discussion, plan:  Activity Modification: as discussed   Work Restrictions updated letter given to patient today.  Rehab: Physical Therapy: Continue as discussed.   Follow up: 1 month, sooner as needed  Questions answered. The patient indicates understanding of these issues and agrees with the plan.    Antonio Gonzalez DO, [place]        Disclaimer: This note consists of symbols derived from keyboarding, dictation and/or voice recognition software. As a result, there may be errors in the script that have gone undetected. Please consider this when interpreting information found in this chart.    The information in this document, created by a scribe for me, accurately reflects the services I personally performed and the decisions made by me. I have reviewed and approved this document for accuracy.              Again, thank you for allowing me to participate in the care of your patient.        Sincerely,        Antonio Gonzalez DO

## 2018-12-10 NOTE — PROGRESS NOTES
"Sports Medicine Clinic Visit    PCP: Dallas Parra    Brynn Sprague is a 38 year old female who is seen in f/u up for    Acute pain of right shoulder  Overuse injury. Since last visit on 11/6/2018 patient has had improvement in her shoulder.  Does feel that PT has been helpful.  She does still have some pain with IR and with lifting.  Does have pain if she overuses her shoulder.    She has been working, but would need an update for W/C/.      **  Patient states she is feeling better. Patient is continuing physical therapy. She is requesting an updated letter for work. Patient does have pain at rest in her shoulder. Patient has been driving. She has mild pain in her fingers when walking has improved since last visit. Patient reports the exercises she has been doing in physical therapy is helping a lot for her back pain as well.     Review of Systems  All other systems reviewed and are negative unless noted above.    Past Medical History:   Diagnosis Date     Chronic neutropenia (H)     possibly benign essential neutropenia     GERD (gastroesophageal reflux disease)      Left renal mass      Normal pregnancy 2006,08,09,12     Pulmonary nodules      Renal cell carcinoma (H)      Past Surgical History:   Procedure Laterality Date     DAVINCI NEPHRECTOMY PARTIAL Left 1/16/2017    Procedure: DAVINCI NEPHRECTOMY PARTIAL;  Surgeon: Jairo rCoss MD;  Location: [facility] OR     INSERT INTRAUTERINE DEVICE  10/16/15     This document serves as a record of the services and decisions personally performed and made by Dr. Antonio Gonzalez, [facility]. It was created on his behalf by Loan Ramos, a trained medical scribe. The creation of this document is based the provider's statements to the medical scribe.    Scribjaleel Ramos 2:39 PM 12/10/2018    Objective  /68   Ht 1.6 m (5' 3\")   Wt 72.1 kg (159 lb)   BMI 28.17 kg/m      GENERAL APPEARANCE: healthy, alert and no distress   GAIT: NORMAL  SKIN: no " suspicious lesions or rashes  NEURO: Normal strength and tone, mentation intact and speech normal  PSYCH:  mentation appears normal and affect normal/bright  HEENT: no scleral icterus  CV: no extremity edema  RESP: nonlabored breathing    Exam  Right Shoulder exam    ROM:        forward flexion 135-140 degrees       abduction lacking 10-20 degrees compared to left       internal rotation no change in pain       external rotation pain at end range of external rotation    Impingement testing:      Pain with Gross       Pain with empty can    Skin:      no visible deformities       well perfused       capillary refill brisk    Sensation:      normal sensation over shoulder and upper extremity     Radiology  Prior imaging:    RIGHT SHOULDER TWO VIEWS      10/25/2018 8:21 PM      INDICATION: Right shoulder pain, impingement symptoms; also has RCC.  Question fragility fx or bony lesions.  Acute pain of right shoulder.     COMPARISON: 12/23/2015.         IMPRESSION: No fracture, dislocation or other acute findings. No  destructive bone lesion.      DIANNE JERRY MD  =============================================================    SHOULDER TWO VIEWS RIGHT  12/23/2015 3:51 PM      HISTORY: Pain in right upper arm. History of falling.     COMPARISON: None.     FINDINGS: Ossified density is seen adjacent to the humeral head on  internal rotation views. This could be a loose joint body or calcific  tendinosis. The acromioclavicular and coracoclavicular distances  appear within normal limits. The subacromial space is maintained.  There is no acute fracture or demonstrated dislocation. There are no  worrisome bony lesions.            IMPRESSION  IMPRESSION: Possible loose joint body versus calcific tendinosis. No  acute osseous abnormality demonstrated.     CASPER GARBER MD      =============================================================    MR SHOULDER RIGHT WITHOUT CONTRAST 2/2/2016 10:12 AM      HISTORY: Impingement  syndrome of right shoulder.     TECHNIQUE: Axial dual echo T2. Coronal T1. Coronal T2 with and without  fat suppression. Sagittal T2.     COMPARISON: X-ray from 12/23/2015.     FINDINGS:  Osseous Acromion Outlet: There is a flat type I acromion. No anterior  downsloping. Mild lateral acromial downsloping. No subacromial spur.  No significant AC degenerative changes.     Rotator Cuff: No evidence of rotator cuff tear. There is an area of  decreased density on coronal series 5 image 11 likely corresponding to  the calcification seen in x-ray indicating an area of calcific  tendinitis involving the infraspinatus tendon. There is mild  thickening of the infraspinatus and supraspinatus tendons, consistent  with tendinosis as well. No evidence of rotator cuff tear.     Labral Structures: No definite labral tear identified.     Biceps Tendon: No tear, dislocation, or significant tendinosis.     Osseous and Cartilaginous Structures: Glenohumeral articular cartilage  is intact. Bone marrow signal is unremarkable.     Additional Findings: Small amount of subacromial fluid. No joint  effusion or periarticular cystic lesion. No focal muscle atrophy.         IMPRESSION:  1. No evidence of rotator cuff tear. Probable calcific tendinitis  distal infraspinatus tendon with adjacent tendinosis in the  supraspinatus tendon as well.  2. Small amount of subacromial fluid likely related to mild  subacromial bursitis.     ALEJANDRA VALERO MD    =================================================    XR CERVICAL SPINE 2/3 VWS 8/25/2016 10:38 AM     HISTORY: Neck pain.     COMPARISON: None.     FINDINGS: Cervical alignment is anatomic. Vertebral body heights and  disc spaces are preserved. Prevertebral soft tissues are normal.         IMPRESSION: Normal cervical spine.     ALEJANDRA LOPES MD      Assessment:  1. Acute pain of right shoulder    2. Overuse injury        Plan:  Discussed the assessment with the patient. Previous calcific tendinitis on  imaging.  ROM improving with time. Improved pain as well. Plan to continue with PT for now. We also discussed potential for updating MRI, subacromial steroid injection. Hold with additional intervention for now, given improving.    We discussed the following: symptom treatment, activity modification/rest, rehab and injection therapy. Following discussion, plan:  Activity Modification: as discussed   Work Restrictions updated letter given to patient today.  Rehab: Physical Therapy: Continue as discussed.   Follow up: 1 month, sooner as needed  Questions answered. The patient indicates understanding of these issues and agrees with the plan.    Antonio Gonzalez, , CAQ        Disclaimer: This note consists of symbols derived from keyboarding, dictation and/or voice recognition software. As a result, there may be errors in the script that have gone undetected. Please consider this when interpreting information found in this chart.    The information in this document, created by a scribe for me, accurately reflects the services I personally performed and the decisions made by me. I have reviewed and approved this document for accuracy.

## 2018-12-10 NOTE — LETTER
[facility] [place]  [address]  [place] [address]  Phone: [phone]  Fax: [phone]      December 10, 2018      RE: Brynn Sprague  [address]  [place] [address]        To whom it may concern:    Brynn Sprague is under my professional care. The employee is ABLE to return to work next scheduled work date.    When the patient returns to work, the following restrictions apply for 1 month:  Reach Above Shoulders: Not at all (0 hours) on right  Best work height mid chest to mid thigh, keeping work close to body.    Continue with physical therapy, which has been helpful.        Sincerely,            Antonio VIRK.

## 2018-12-10 NOTE — PROGRESS NOTES
Focus:  Status  Data:      Pt  Co's of pain pain med given, pt co's of nausea compazine et zofran given pt has had several small emesis less than 100cc each, pt having difficulty with eating et drinking re to nausea.  Pt has been up et ambulated however not much improvement .  Irma notified et did come to see pt.  Pt given iv bolus et iv fluids infusing, pt given toradol iv for pain, et dulcolox supp given.  Also scopalomine patch given for nausea control.  Will continue to monitor abdulaziz ruiz with problems  Discharge post poned for today   [Follow-Up - Clinic] : a clinic follow-up of [FreeTextEntry2] : CP

## 2018-12-13 ENCOUNTER — THERAPY VISIT (OUTPATIENT)
Dept: PHYSICAL THERAPY | Facility: CLINIC | Age: 38
End: 2018-12-13
Payer: OTHER MISCELLANEOUS

## 2018-12-13 DIAGNOSIS — M25.511 ACUTE PAIN OF RIGHT SHOULDER: ICD-10-CM

## 2018-12-13 PROCEDURE — 97112 NEUROMUSCULAR REEDUCATION: CPT | Mod: GP | Performed by: PHYSICAL THERAPIST

## 2018-12-13 PROCEDURE — 97110 THERAPEUTIC EXERCISES: CPT | Mod: GP | Performed by: PHYSICAL THERAPIST

## 2018-12-27 ENCOUNTER — THERAPY VISIT (OUTPATIENT)
Dept: PHYSICAL THERAPY | Facility: CLINIC | Age: 38
End: 2018-12-27
Payer: OTHER MISCELLANEOUS

## 2018-12-27 DIAGNOSIS — M25.511 ACUTE PAIN OF RIGHT SHOULDER: ICD-10-CM

## 2018-12-27 PROCEDURE — 97112 NEUROMUSCULAR REEDUCATION: CPT | Mod: GP | Performed by: PHYSICAL THERAPIST

## 2018-12-27 PROCEDURE — 97110 THERAPEUTIC EXERCISES: CPT | Mod: GP | Performed by: PHYSICAL THERAPIST

## 2019-01-07 ENCOUNTER — THERAPY VISIT (OUTPATIENT)
Dept: PHYSICAL THERAPY | Facility: CLINIC | Age: 39
End: 2019-01-07
Payer: OTHER MISCELLANEOUS

## 2019-01-07 DIAGNOSIS — M25.511 ACUTE PAIN OF RIGHT SHOULDER: ICD-10-CM

## 2019-01-07 PROCEDURE — 97110 THERAPEUTIC EXERCISES: CPT | Mod: GP | Performed by: PHYSICAL THERAPIST

## 2019-01-07 PROCEDURE — 97112 NEUROMUSCULAR REEDUCATION: CPT | Mod: GP | Performed by: PHYSICAL THERAPIST

## 2019-01-07 NOTE — PROGRESS NOTES
"Subjective:  HPI                    Objective:  System    Physical Exam    General     ROS    Assessment/Plan:    PROGRESS  REPORT    Progress reporting period is from 11/20/18 to 1/07/18.       SUBJECTIVE  Patient reports significant improvement in her right UE pain since the start of therapy.  Reaching activities have become a lot less painful, although she still experiences some \"clicking\" in her shoulder at times.  No longer experiencing pain into forearm and hand like she used to. 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          Current pain level is 2/10  .     Previous pain level was  9/10  .   Changes in function:  As noted above  Adverse reaction to treatment or activity: None    OBJECTIVE  Cervical AROM: WNL.  Complaints of right neck pain with extension, left rotation, and left SB    RIGHT SHOULDER AROM PROM STRENGTH   Flexion 118 * 135* 5/5   Extension 45* WNL 5/5   Abduction 132* WNL* 4+/5*   ER WNL WNL 5/5   IR T12* WNL 5/5     Special Tests:    Neer Impingement sign positive  Hawkin's-Omero negative  Martin's test positive  Compression/rotation test negative  Crank test negative    Palpation:  Tenderness reported over distal supraspinatus and UT.  No tenderness over right ACJ or remaining cuff musculature                                                ASSESSMENT/PLAN  Updated problem list and treatment plan: Diagnosis 1:  Right Shoulder Pain   Pain -  self management, education and home program  Decreased ROM/flexibility - therapeutic exercise  Decreased strength - therapeutic exercise  Decreased function - home program  STG/LTGs have been met or progress has been made towards goals:  Yes (See Goal flow sheet completed today.)    Assessment of Progress: The patient's condition is improving.  Patient is meeting short term goals and " progressing towards long term goals.  Self Management Plans:  Patient has been instructed in a home treatment program.  I have re-evaluated this patient and find that the nature, scope, duration and intensity of the therapy is appropriate for the medical condition of the patient.  Brynn continues to require the following intervention to meet STG and LTG's:  PT    Recommendations:  This patient would benefit from continued therapy at a decreased frequency of once every other week for 3 more visits to further increase strength and resolve impingement.         Please refer to the daily flowsheet for treatment today, total treatment time and time spent performing 1:1 timed codes.

## 2019-01-08 ENCOUNTER — OFFICE VISIT (OUTPATIENT)
Dept: ORTHOPEDICS | Facility: CLINIC | Age: 39
End: 2019-01-08
Payer: OTHER MISCELLANEOUS

## 2019-01-08 VITALS
DIASTOLIC BLOOD PRESSURE: 62 MMHG | BODY MASS INDEX: 28.35 KG/M2 | HEIGHT: 63 IN | SYSTOLIC BLOOD PRESSURE: 110 MMHG | WEIGHT: 160 LBS
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
## 2019-01-08 DIAGNOSIS — M75.41 IMPINGEMENT SYNDROME OF RIGHT SHOULDER: ICD-10-CM

## 2019-01-08 DIAGNOSIS — M25.511 ACUTE PAIN OF RIGHT SHOULDER: Primary | ICD-10-CM

## 2019-01-08 PROCEDURE — 99213 OFFICE O/P EST LOW 20 MIN: CPT | Performed by: PEDIATRICS

## 2019-01-08 ASSESSMENT — MIFFLIN-ST. JEOR: SCORE: 1374.89

## 2019-01-08 NOTE — LETTER
[facility] [place]  [address]  [address]  [place] [address]  Phone: [phone]  Fax: [phone]      January 8, 2019      RE: Brynn Sprague  [address]  [place] [address]        To whom it may concern:    Brynn Sprague is under my professional care. The employee is ABLE to return to work next scheduled work date.    When the patient returns to work, the following restrictions apply for 6 weeks:  Reach Above Shoulders: up to 5 minutes per hour on right.  Best work height mid chest to mid thigh, keeping work close to body.    Continue with physical therapy, which has been helpful.    Anticipate follow up after completion of PT.      Sincerely,            Antonio VIRK.

## 2019-01-08 NOTE — PROGRESS NOTES
"Sports Medicine Clinic Visit    PCP: Dallas Parra    Brynn Srpague is a 38 year old female who is seen in f/u up for    Acute pain of right shoulder  Overuse injury. Since last visit on 12/10/2018 patient has continued with therapy.  She does feel that there has been an improvement with her shoulder pain.  She has continued to work with her restrictions.     Here today with her [facility].   **  Couple weeks after last visit had increased pain with limited mobility again, but used some heat and symptoms improved again after about a day. Was concerning for her, but has not happened again since then. Had some pain posterior arm and into shoulder; pain similar to what had previously.    Gets some noise with shoulder motion at times. No painful.    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      Review of Systems  All other systems reviewed and are negative unless noted above.    Past Medical History:   Diagnosis Date     Chronic neutropenia (H)     possibly benign essential neutropenia     GERD (gastroesophageal reflux disease)      Left renal mass      Normal pregnancy 2006,08,09,12     Pulmonary nodules      Renal cell carcinoma (H)      Past Surgical History:   Procedure Laterality Date     DAVINCI NEPHRECTOMY PARTIAL Left 1/16/2017    Procedure: DAVINCI NEPHRECTOMY PARTIAL;  Surgeon: Jairo Cross MD;  Location: [facility] OR     INSERT INTRAUTERINE DEVICE  10/16/15       Objective  /62   Ht 1.6 m (5' 3\")   Wt 72.6 kg (160 lb)   BMI 28.34 kg/m      GENERAL APPEARANCE: healthy, alert and no distress   GAIT: NORMAL  SKIN: no suspicious lesions or rashes  NEURO: Normal strength and tone, mentation intact and speech normal  PSYCH:  mentation appears normal and affect normal/bright  HEENT: no scleral icterus  CV: no extremity edema  RESP: nonlabored breathing    Exam  Right Shoulder " exam    ROM:        forward flexion ~120-130 degrees       abduction ~120-130  Mild discomfort with above active motion    Impingement testing:             Mild pain with Gross       Pain with empty can    Skin:      no visible deformities       well perfused       capillary refill brisk    Sensation:      normal sensation over shoulder and upper extremity       Radiology  Prior imaging:    RIGHT SHOULDER TWO VIEWS      10/25/2018 8:21 PM      INDICATION: Right shoulder pain, impingement symptoms; also has RCC.  Question fragility fx or bony lesions.  Acute pain of right shoulder.     COMPARISON: 12/23/2015.         IMPRESSION: No fracture, dislocation or other acute findings. No  destructive bone lesion.      DIANNE JERRY MD  ====================================================    SHOULDER TWO VIEWS RIGHT  12/23/2015 3:51 PM      HISTORY: Pain in right upper arm. History of falling.     COMPARISON: None.     FINDINGS: Ossified density is seen adjacent to the humeral head on  internal rotation views. This could be a loose joint body or calcific  tendinosis. The acromioclavicular and coracoclavicular distances  appear within normal limits. The subacromial space is maintained.  There is no acute fracture or demonstrated dislocation. There are no  worrisome bony lesions.            IMPRESSION  IMPRESSION: Possible loose joint body versus calcific tendinosis. No  acute osseous abnormality demonstrated.     CASPER GARBER MD      =====================================================    MR SHOULDER RIGHT WITHOUT CONTRAST 2/2/2016 10:12 AM      HISTORY: Impingement syndrome of right shoulder.     TECHNIQUE: Axial dual echo T2. Coronal T1. Coronal T2 with and without  fat suppression. Sagittal T2.     COMPARISON: X-ray from 12/23/2015.     FINDINGS:  Osseous Acromion Outlet: There is a flat type I acromion. No anterior  downsloping. Mild lateral acromial downsloping. No subacromial spur.  No significant AC  degenerative changes.     Rotator Cuff: No evidence of rotator cuff tear. There is an area of  decreased density on coronal series 5 image 11 likely corresponding to  the calcification seen in x-ray indicating an area of calcific  tendinitis involving the infraspinatus tendon. There is mild  thickening of the infraspinatus and supraspinatus tendons, consistent  with tendinosis as well. No evidence of rotator cuff tear.     Labral Structures: No definite labral tear identified.     Biceps Tendon: No tear, dislocation, or significant tendinosis.     Osseous and Cartilaginous Structures: Glenohumeral articular cartilage  is intact. Bone marrow signal is unremarkable.     Additional Findings: Small amount of subacromial fluid. No joint  effusion or periarticular cystic lesion. No focal muscle atrophy.         IMPRESSION:  1. No evidence of rotator cuff tear. Probable calcific tendinitis  distal infraspinatus tendon with adjacent tendinosis in the  supraspinatus tendon as well.  2. Small amount of subacromial fluid likely related to mild  subacromial bursitis.     ALEJANDRA VALERO MD      Assessment:  1. Acute pain of right shoulder    2. Overuse injury    3. Impingement syndrome of right shoulder        Plan:  Discussed the assessment with the patient and [facility]. Reviewed course. Overall improved since onset, still some symptoms.  Activity modification reviewed. Discussed nature of her work. Updated work letter, restrictions remain. Sounds like ergonomic changes have been good.  Continue with PT, as that has been helpful, along with activity modification.  Follow up: 6 weeks, sooner prn. If symptoms persist, additional considerations may be imaging, continuing rehab, possibly injection. For now, maximize rehab.  Questions answered. The patient indicates understanding of these issues and agrees with the plan.    Antonio Gonzalez, , CAQ          Disclaimer: This note consists of symbols derived from keyboarding, dictation  and/or voice recognition software. As a result, there may be errors in the script that have gone undetected. Please consider this when interpreting information found in this chart.

## 2019-02-05 ENCOUNTER — THERAPY VISIT (OUTPATIENT)
Dept: PHYSICAL THERAPY | Facility: CLINIC | Age: 39
End: 2019-02-05
Payer: OTHER MISCELLANEOUS

## 2019-02-05 DIAGNOSIS — M25.511 ACUTE PAIN OF RIGHT SHOULDER: ICD-10-CM

## 2019-02-05 PROCEDURE — 97110 THERAPEUTIC EXERCISES: CPT | Mod: GP | Performed by: PHYSICAL THERAPIST

## 2019-02-05 PROCEDURE — 97112 NEUROMUSCULAR REEDUCATION: CPT | Mod: GP | Performed by: PHYSICAL THERAPIST

## 2019-02-19 ENCOUNTER — OFFICE VISIT (OUTPATIENT)
Dept: ORTHOPEDICS | Facility: CLINIC | Age: 39
End: 2019-02-19
Payer: OTHER MISCELLANEOUS

## 2019-02-19 VITALS
HEIGHT: 63 IN | SYSTOLIC BLOOD PRESSURE: 102 MMHG | DIASTOLIC BLOOD PRESSURE: 62 MMHG | WEIGHT: 161 LBS | BODY MASS INDEX: 28.53 KG/M2

## 2019-02-19 DIAGNOSIS — M75.41 IMPINGEMENT SYNDROME OF RIGHT SHOULDER: Primary | ICD-10-CM
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
## 2019-02-19 PROCEDURE — 99213 OFFICE O/P EST LOW 20 MIN: CPT | Performed by: PEDIATRICS

## 2019-02-19 ASSESSMENT — MIFFLIN-ST. JEOR: SCORE: 1379.42

## 2019-02-19 NOTE — PROGRESS NOTES
"Sports Medicine Clinic Visit    PCP: Dallas Parra    Brynn Sprague is a 38 year old female who is seen in f/u up for    Overuse injury  Impingement syndrome of right shoulder. Since last visit on 1/8/2019 patient has continued to have improvement in her shoulder.  She has only attended 1 session of PT since the last visit, did have a few others scheduled but they were cancelled for various reasons.   She has been working within her restrictions.    Here today with her [facility].    **  PT notes reviewed. Next PT visit scheduled for 2/25/19.  Able to do vacuum. 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 Mostly doing activities with arm below shoulder level.  **  Feels like she may be ready to do a little more overhead work.  Has questions about returning to doing more work.  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  Still needs to do some overhead motion to get them thoroughly clean, however.      Review of Systems  All other systems reviewed and are negative unless noted above.    Past Medical History:   Diagnosis Date     Chronic neutropenia (H)     possibly benign essential neutropenia     GERD (gastroesophageal reflux disease)      Left renal mass      Normal pregnancy 2006,08,09,12     Pulmonary nodules      Renal cell carcinoma (H)      Past Surgical History:   Procedure Laterality Date     DAVINCI NEPHRECTOMY PARTIAL Left 1/16/2017    Procedure: DAVINCI NEPHRECTOMY PARTIAL;  Surgeon: Jairo Cross MD;  Location:  OR     INSERT INTRAUTERINE DEVICE  10/16/15       Objective  /62   Ht 1.6 m (5' 3\")   Wt 73 kg (161 lb)   BMI 28.52 kg/m      GENERAL APPEARANCE: healthy, alert and no distress   GAIT: NORMAL  SKIN: no suspicious lesions or rashes  NEURO: Normal strength and tone, mentation intact and speech normal  PSYCH:  mentation appears normal and affect " normal/bright  HEENT: no scleral icterus  CV: no extremity edema  RESP: nonlabored breathing      Exam  Right Shoulder exam    ROM:        forward flexion ~150 degrees, mild limitation compared to left       abduction grossly symmetric  Mild limitation end internal rotation  Mild discomfort with flexion, internal rotation    Impingement testing:             Mild pain with Gross       Some pain with empty can    Skin:      no visible deformities       well perfused       capillary refill brisk    Sensation:      normal sensation over shoulder and upper extremity       Radiology  Prior imaging:    RIGHT SHOULDER TWO VIEWS      10/25/2018 8:21 PM      INDICATION: Right shoulder pain, impingement symptoms; also has RCC.  Question fragility fx or bony lesions.  Acute pain of right shoulder.     COMPARISON: 12/23/2015.         IMPRESSION: No fracture, dislocation or other acute findings. No  destructive bone lesion.      DIANNE JERRY MD  ====================================================    SHOULDER TWO VIEWS RIGHT  12/23/2015 3:51 PM      HISTORY: Pain in right upper arm. History of falling.     COMPARISON: None.     FINDINGS: Ossified density is seen adjacent to the humeral head on  internal rotation views. This could be a loose joint body or calcific  tendinosis. The acromioclavicular and coracoclavicular distances  appear within normal limits. The subacromial space is maintained.  There is no acute fracture or demonstrated dislocation. There are no  worrisome bony lesions.            IMPRESSION  IMPRESSION: Possible loose joint body versus calcific tendinosis. No  acute osseous abnormality demonstrated.     CASPER GARBER MD      =====================================================    MR SHOULDER RIGHT WITHOUT CONTRAST 2/2/2016 10:12 AM      HISTORY: Impingement syndrome of right shoulder.     TECHNIQUE: Axial dual echo T2. Coronal T1. Coronal T2 with and without  fat suppression. Sagittal T2.     COMPARISON:  X-ray from 12/23/2015.     FINDINGS:  Osseous Acromion Outlet: There is a flat type I acromion. No anterior  downsloping. Mild lateral acromial downsloping. No subacromial spur.  No significant AC degenerative changes.     Rotator Cuff: No evidence of rotator cuff tear. There is an area of  decreased density on coronal series 5 image 11 likely corresponding to  the calcification seen in x-ray indicating an area of calcific  tendinitis involving the infraspinatus tendon. There is mild  thickening of the infraspinatus and supraspinatus tendons, consistent  with tendinosis as well. No evidence of rotator cuff tear.     Labral Structures: No definite labral tear identified.     Biceps Tendon: No tear, dislocation, or significant tendinosis.     Osseous and Cartilaginous Structures: Glenohumeral articular cartilage  is intact. Bone marrow signal is unremarkable.     Additional Findings: Small amount of subacromial fluid. No joint  effusion or periarticular cystic lesion. No focal muscle atrophy.         IMPRESSION:  1. No evidence of rotator cuff tear. Probable calcific tendinitis  distal infraspinatus tendon with adjacent tendinosis in the  supraspinatus tendon as well.  2. Small amount of subacromial fluid likely related to mild  subacromial bursitis.     ALEJANDRA VALERO MD        Assessment:  1. Impingement syndrome of right shoulder    2. Overuse injury        Plan:  Discussed the assessment with the patient and her [facility]. She continues to improve.  Anticipate continued improvement with time.  We reviewed prior imaging; no additional imaging required currently.   Discussed potential for steroid injection, for pain relief; defer once again, continue the physical therapy.  Continue with PT.  She has additional visits authorized and planned.  Updated letter regarding work.  We will remove formal restrictions, as the previous letter did not significantly impact her activities.  See no letter.  If she has increase in symptoms  again related to increased use of the right shoulder, contact clinic and would place restrictions again.  Follow up: ~6 weeks, or on completion of physical therapy, sooner if needed.  Questions answered. The patient indicates understanding of these issues and agrees with the plan.    Antonio Gonzalez DO, CAQ          Disclaimer: This note consists of symbols derived from keyboarding, dictation and/or voice recognition software. As a result, there may be errors in the script that have gone undetected. Please consider this when interpreting information found in this chart.

## 2019-02-19 NOTE — LETTER
[facility] [place]  [address]  [address]  [place] [address]  Phone: [phone]  Fax: [phone]      February 19, 2019      RE: Brynn Sprague  [address]  [place] [address]        To whom it may concern:    Brynn Sprague is under my professional care. The employee is ABLE to return to work next scheduled work date.    When the patient returns to work, advise the following, for the next 6 weeks:  Best work height mid chest to mid thigh, keeping work close to body.  May increase use of right shoulder/upper extremity to do overhead work on a trial basis.    Continue with physical therapy, which has been helpful.    Anticipate follow up after completion of PT.      Sincerely,            Antonio VALDES

## 2019-02-19 NOTE — LETTER
"    2/19/2019         RE: Brynn Sprague  [address]  [place] [address]        Dear Colleague,    Thank you for referring your patient, Brynn Sprague, to the [facility]. Please see a copy of my visit note below.    Sports Medicine Clinic Visit    PCP: Dallas Parra    Brynn Sprague is a 38 year old female who is seen in f/u up for    Overuse injury  Impingement syndrome of right shoulder. Since last visit on 1/8/2019 patient has continued to have improvement in her shoulder.  She has only attended 1 session of PT since the last visit, did have a few others scheduled but they were cancelled for various reasons.   She has been working within her restrictions.    Here today with her [employer].    **  PT notes reviewed. Next PT visit scheduled for 2/25/19.  Able to do vacuum. 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 Mostly doing activities with arm below shoulder level.  **  Feels like she may be ready to do a little more overhead work.  Has questions about returning to doing more work.  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  Still needs to do some overhead motion to get them thoroughly clean, however.      Review of Systems  All other systems reviewed and are negative unless noted above.    Past Medical History:   Diagnosis Date     Chronic neutropenia (H)     possibly benign essential neutropenia     GERD (gastroesophageal reflux disease)      Left renal mass      Normal pregnancy 2006,08,09,12     Pulmonary nodules      Renal cell carcinoma (H)      Past Surgical History:   Procedure Laterality Date     DAVINCI NEPHRECTOMY PARTIAL Left 1/16/2017    Procedure: DAVINCI NEPHRECTOMY PARTIAL;  Surgeon: Jairo Cross MD;  [facility]     INSERT INTRAUTERINE DEVICE  10/16/15       Objective  /62   Ht 1.6 m (5' 3\")   Wt 73 kg " (161 lb)   BMI 28.52 kg/m       GENERAL APPEARANCE: healthy, alert and no distress   GAIT: NORMAL  SKIN: no suspicious lesions or rashes  NEURO: Normal strength and tone, mentation intact and speech normal  PSYCH:  mentation appears normal and affect normal/bright  HEENT: no scleral icterus  CV: no extremity edema  RESP: nonlabored breathing      Exam  Right Shoulder exam    ROM:        forward flexion ~150 degrees, mild limitation compared to left       abduction grossly symmetric  Mild limitation end internal rotation  Mild discomfort with flexion, internal rotation    Impingement testing:             Mild pain with Gross       Some pain with empty can    Skin:      no visible deformities       well perfused       capillary refill brisk    Sensation:      normal sensation over shoulder and upper extremity       Radiology  Prior imaging:    RIGHT SHOULDER TWO VIEWS      10/25/2018 8:21 PM      INDICATION: Right shoulder pain, impingement symptoms; also has RCC.  Question fragility fx or bony lesions.  Acute pain of right shoulder.     COMPARISON: 12/23/2015.         IMPRESSION: No fracture, dislocation or other acute findings. No  destructive bone lesion.      DIANNE JERRY MD  ====================================================    SHOULDER TWO VIEWS RIGHT  12/23/2015 3:51 PM      HISTORY: Pain in right upper arm. History of falling.     COMPARISON: None.     FINDINGS: Ossified density is seen adjacent to the humeral head on  internal rotation views. This could be a loose joint body or calcific  tendinosis. The acromioclavicular and coracoclavicular distances  appear within normal limits. The subacromial space is maintained.  There is no acute fracture or demonstrated dislocation. There are no  worrisome bony lesions.            IMPRESSION  IMPRESSION: Possible loose joint body versus calcific tendinosis. No  acute osseous abnormality demonstrated.     CASPER GARBER  MD      =====================================================    MR SHOULDER RIGHT WITHOUT CONTRAST 2/2/2016 10:12 AM      HISTORY: Impingement syndrome of right shoulder.     TECHNIQUE: Axial dual echo T2. Coronal T1. Coronal T2 with and without  fat suppression. Sagittal T2.     COMPARISON: X-ray from 12/23/2015.     FINDINGS:  Osseous Acromion Outlet: There is a flat type I acromion. No anterior  downsloping. Mild lateral acromial downsloping. No subacromial spur.  No significant AC degenerative changes.     Rotator Cuff: No evidence of rotator cuff tear. There is an area of  decreased density on coronal series 5 image 11 likely corresponding to  the calcification seen in x-ray indicating an area of calcific  tendinitis involving the infraspinatus tendon. There is mild  thickening of the infraspinatus and supraspinatus tendons, consistent  with tendinosis as well. No evidence of rotator cuff tear.     Labral Structures: No definite labral tear identified.     Biceps Tendon: No tear, dislocation, or significant tendinosis.     Osseous and Cartilaginous Structures: Glenohumeral articular cartilage  is intact. Bone marrow signal is unremarkable.     Additional Findings: Small amount of subacromial fluid. No joint  effusion or periarticular cystic lesion. No focal muscle atrophy.         IMPRESSION:  1. No evidence of rotator cuff tear. Probable calcific tendinitis  distal infraspinatus tendon with adjacent tendinosis in the  supraspinatus tendon as well.  2. Small amount of subacromial fluid likely related to mild  subacromial bursitis.     ALEJANDRA VALERO MD        Assessment:  1. Impingement syndrome of right shoulder    2. Overuse injury        Plan:  Discussed the assessment with the patient and her [facility]. She continues to improve.  Anticipate continued improvement with time.  We reviewed prior imaging; no additional imaging required currently.   Discussed potential for steroid injection, for pain relief; defer once  again, continue the physical therapy.  Continue with PT.  She has additional visits authorized and planned.  Updated letter regarding work.  We will remove formal restrictions, as the previous letter did not significantly impact her activities.  See no letter.  If she has increase in symptoms again related to increased use of the right shoulder, contact clinic and would place restrictions again.  Follow up: ~6 weeks, or on completion of physical therapy, sooner if needed.  Questions answered. The patient indicates understanding of these issues and agrees with the plan.    Antonio Gonzalez DO, CAQ          Disclaimer: This note consists of symbols derived from keyboarding, dictation and/or voice recognition software. As a result, there may be errors in the script that have gone undetected. Please consider this when interpreting information found in this chart.        Again, thank you for allowing me to participate in the care of your patient.        Sincerely,        Antonio Gonzalez DO

## 2019-02-25 ENCOUNTER — THERAPY VISIT (OUTPATIENT)
Dept: PHYSICAL THERAPY | Facility: CLINIC | Age: 39
End: 2019-02-25
Payer: OTHER MISCELLANEOUS

## 2019-02-25 DIAGNOSIS — M25.511 ACUTE PAIN OF RIGHT SHOULDER: ICD-10-CM

## 2019-02-25 PROCEDURE — 97110 THERAPEUTIC EXERCISES: CPT | Mod: GP | Performed by: PHYSICAL THERAPIST

## 2019-03-11 ENCOUNTER — THERAPY VISIT (OUTPATIENT)
Dept: PHYSICAL THERAPY | Facility: CLINIC | Age: 39
End: 2019-03-11
Payer: OTHER MISCELLANEOUS

## 2019-03-11 DIAGNOSIS — M25.511 ACUTE PAIN OF RIGHT SHOULDER: ICD-10-CM

## 2019-03-11 PROCEDURE — 97110 THERAPEUTIC EXERCISES: CPT | Mod: GP | Performed by: PHYSICAL THERAPIST

## 2019-03-11 PROCEDURE — 97112 NEUROMUSCULAR REEDUCATION: CPT | Mod: GP | Performed by: PHYSICAL THERAPIST

## 2019-03-12 NOTE — PROGRESS NOTES
Subjective:  HPI                    Objective:  System    Physical Exam    General     ROS      PROGRESS  REPORT     Progress reporting period is from 1/07/19 to 3/11/19.        SUBJECTIVE  Patient reports 80% improvement in her right shoulder pain overall.  She has been able to perform job duties with less pain.  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  Since doing recent neck exercises, she has noticed less difficulty cutting food or hand washing clothes without her whole arm hurting like it used to.      Current pain level is 3/10  .     Previous pain level was  2/10  .   Changes in function:  As noted above  Adverse reaction to treatment or activity: None     OBJECTIVE  Cerivcal AROM:   WNL and painfree.  Right Shoulder AROM:  Flex 132, Abd 138, IR/Ext T10 (pain), ER WNL  Right Shoulder Strength: flexion 5/5, Abd 4/5 (+), ER 5/5, IR 5/5.  Special Tests:  Neer Impingement Sign mildly positive;  Hawkin's-Omero positive;  O-Pa's Test negative.          ASSESSMENT/PLAN  STG/LTGs have been met or progress has been made towards goals:  Yes, with reaching activities  Assessment of Progress: The patient has met all of her long term goals.  Self Management Plans:  Patient  has been instructed in self management of symptoms.  PT intervention is no longer required to meet STG/LTG.     Recommendations:  This patient is ready to be discharged from therapy and continue their home treatment program.     Please refer to the daily flowsheet for treatment today, total treatment time and time spent performing 1:1 timed codes.

## 2019-04-02 ENCOUNTER — OFFICE VISIT (OUTPATIENT)
Dept: ORTHOPEDICS | Facility: CLINIC | Age: 39
End: 2019-04-02
Payer: OTHER MISCELLANEOUS

## 2019-04-02 VITALS
HEIGHT: 63 IN | BODY MASS INDEX: 28 KG/M2 | WEIGHT: 158 LBS | DIASTOLIC BLOOD PRESSURE: 70 MMHG | SYSTOLIC BLOOD PRESSURE: 108 MMHG
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
## 2019-04-02 DIAGNOSIS — M75.41 IMPINGEMENT SYNDROME OF RIGHT SHOULDER: ICD-10-CM

## 2019-04-02 PROCEDURE — 99213 OFFICE O/P EST LOW 20 MIN: CPT | Performed by: PEDIATRICS

## 2019-04-02 ASSESSMENT — MIFFLIN-ST. JEOR: SCORE: 1365.81

## 2019-04-02 NOTE — LETTER
[facility]  [address]  [address]  [place] [address]  Phone: [phone]  Fax: [phone]      April 2, 2019      RE: Brynn Sprague  [address]  [place] [address]        To whom it may concern:    Brynn Sprague is under my professional care. The employee is ABLE to return to work next scheduled work date.    When the patient returns to work, there are no formal work restrictions.    Continue with home exercises from physical therapy.        Sincerely,            Antonio VIRK.

## 2019-04-02 NOTE — PROGRESS NOTES
"Sports Medicine Clinic Visit    PCP: Dallas Parra    Brynn Sprague is a 38 year old female who is seen in f/u up for    Overuse injury  Impingement syndrome of right shoulder. Since last visit on 2/19/2019 patient has been discharged from PT.  She has continued with her HEP.  Does feel that her shoulder is improving.  She has returned to overhead work.  Does have some pain, but feels it is manageable.    Here today with her [employer]  **  HEP helps with pain that can occur after work.  Working unrestricted currently.    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 Neck exercises do help with these symptoms.        Review of Systems  All other systems reviewed and are negative unless noted above.    Past Medical History:   Diagnosis Date     Chronic neutropenia (H)     possibly benign essential neutropenia     GERD (gastroesophageal reflux disease)      Left renal mass      Normal pregnancy 2006,08,09,12     Pulmonary nodules      Renal cell carcinoma (H)      Past Surgical History:   Procedure Laterality Date     DAVINCI NEPHRECTOMY PARTIAL Left 1/16/2017    Procedure: DAVINCI NEPHRECTOMY PARTIAL;  Surgeon: Jairo Cross MD;  [facility]     INSERT INTRAUTERINE DEVICE  10/16/15       Objective  /70   Ht 1.6 m (5' 3\")   Wt 71.7 kg (158 lb)   BMI 27.99 kg/m      GENERAL APPEARANCE: healthy, alert and no distress   GAIT: NORMAL  SKIN: no suspicious lesions or rashes  NEURO: Normal strength and tone, mentation intact and speech normal  PSYCH:  mentation appears normal and affect normal/bright  HEENT: no scleral icterus  CV: no extremity edema  RESP: nonlabored breathing      Exam  Cervical spine exam:  Range of Motion: forward flexion , extension , lateral rotation , lateral bending grossly full; min right upper trap pain with left lateral bending   Inspection: No visible deformity.  Normal lordotic curvature is " maintained.  Inciting exams:  Spurling neg  Skin: well perfused, capillary refill brisk.  Lymphatics: No edema noted in the upper extremities.      Right Shoulder exam    ROM:        forward flexion ~150-160 degrees, min limitation compared to left       abduction grossly symmetric  IR, ER grossly full    Skin:      no visible deformities       well perfused       capillary refill brisk    Sensation:      normal sensation over shoulder and upper extremity         Radiology  None new today.  Prior imaging briefly reviewed.  See chart and prior notes for imaging findings.      Assessment:  1. Overuse injury    2. Impingement syndrome of right shoulder        Plan:  Discussed the assessment with the patient and her C. reviewed her course.  Continued clinical improvement.  She has a few symptoms that remain, but is noting continued benefit from therapy.  Is working without restrictions currently, keeping activities in check somewhat on her own.  Notes that with warmer weather, summer coming, pain is generally better and she is no longer shoveling.  Continue with HEP from PT.  We discussed again additional considerations of advanced imaging of the shoulder, as well as subacromial steroid injection.  She does not desire injection, nor is required.  No additional imaging is required currently, given clinical improvement.  Will discontinue routine care here.  Follow up: will leave as needed  Questions answered. The patient indicates understanding of these issues and agrees with the plan.    Antonio Gonzalez, , CAQ          Disclaimer: This note consists of symbols derived from keyboarding, dictation and/or voice recognition software. As a result, there may be errors in the script that have gone undetected. Please consider this when interpreting information found in this chart.

## 2019-04-02 NOTE — LETTER
"    4/2/2019         RE: Brynn Sprague  [address]  [place] [address]        Dear Colleague,    Thank you for referring your patient, Brynn Sprague, to the [facility]. Please see a copy of my visit note below.    Sports Medicine Clinic Visit    PCP: Dallas Parra    Brynn Sprague is a 38 year old female who is seen in f/u up for    Overuse injury  Impingement syndrome of right shoulder. Since last visit on 2/19/2019 patient has been discharged from PT.  She has continued with her HEP.  Does feel that her shoulder is improving.  She has returned to overhead work.  Does have some pain, but feels it is manageable.    Here today with her [employer]  **  HEP helps with pain that can occur after work.  Working unrestricted currently.    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 Neck exercises do help with these symptoms.        Review of Systems  All other systems reviewed and are negative unless noted above.    Past Medical History:   Diagnosis Date     Chronic neutropenia (H)     possibly benign essential neutropenia     GERD (gastroesophageal reflux disease)      Left renal mass      Normal pregnancy 2006,08,09,12     Pulmonary nodules      Renal cell carcinoma (H)      Past Surgical History:   Procedure Laterality Date     DAVINCI NEPHRECTOMY PARTIAL Left 1/16/2017    Procedure: DAVINCI NEPHRECTOMY PARTIAL;  Surgeon: Jairo Cross MD;  [facility]     INSERT INTRAUTERINE DEVICE  10/16/15       Objective  /70   Ht 1.6 m (5' 3\")   Wt 71.7 kg (158 lb)   BMI 27.99 kg/m       GENERAL APPEARANCE: healthy, alert and no distress   GAIT: NORMAL  SKIN: no suspicious lesions or rashes  NEURO: Normal strength and tone, mentation intact and speech normal  PSYCH:  mentation appears normal and affect normal/bright  HEENT: no scleral icterus  CV: no extremity edema  RESP: nonlabored " breathing      Exam  Cervical spine exam:  Range of Motion: forward flexion , extension , lateral rotation , lateral bending grossly full; min right upper trap pain with left lateral bending   Inspection: No visible deformity.  Normal lordotic curvature is maintained.  Inciting exams:  Spurling neg  Skin: well perfused, capillary refill brisk.  Lymphatics: No edema noted in the upper extremities.      Right Shoulder exam    ROM:        forward flexion ~150-160 degrees, min limitation compared to left       abduction grossly symmetric  IR, ER grossly full    Skin:      no visible deformities       well perfused       capillary refill brisk    Sensation:      normal sensation over shoulder and upper extremity         Radiology  None new today.  Prior imaging briefly reviewed.  See chart and prior notes for imaging findings.      Assessment:  1. Overuse injury    2. Impingement syndrome of right shoulder        Plan:  Discussed the assessment with the patient and her C. reviewed her course.  Continued clinical improvement.  She has a few symptoms that remain, but is noting continued benefit from therapy.  Is working without restrictions currently, keeping activities in check somewhat on her own.  Notes that with warmer weather, summer coming, pain is generally better and she is no longer shoveling.  Continue with HEP from PT.  We discussed again additional considerations of advanced imaging of the shoulder, as well as subacromial steroid injection.  She does not desire injection, nor is required.  No additional imaging is required currently, given clinical improvement.  Will discontinue routine care here.  Follow up: will leave as needed  Questions answered. The patient indicates understanding of these issues and agrees with the plan.    Antonio Gonzalez DO, CAQ          Disclaimer: This note consists of symbols derived from keyboarding, dictation and/or voice recognition software. As a result, there may be errors  in the script that have gone undetected. Please consider this when interpreting information found in this chart.        Again, thank you for allowing me to participate in the care of your patient.        Sincerely,        Antonio Gonzalez, DO

## 2019-05-06 ENCOUNTER — ANCILLARY PROCEDURE (OUTPATIENT)
Dept: GENERAL RADIOLOGY | Facility: CLINIC | Age: 39
End: 2019-05-06
Attending: PHYSICIAN ASSISTANT
Payer: COMMERCIAL

## 2019-05-06 ENCOUNTER — OFFICE VISIT (OUTPATIENT)
Dept: FAMILY MEDICINE | Facility: CLINIC | Age: 39
End: 2019-05-06
Payer: COMMERCIAL

## 2019-05-06 VITALS
HEART RATE: 80 BPM | OXYGEN SATURATION: 100 % | SYSTOLIC BLOOD PRESSURE: 104 MMHG | RESPIRATION RATE: 16 BRPM | HEIGHT: 63 IN | DIASTOLIC BLOOD PRESSURE: 69 MMHG | WEIGHT: 160 LBS | TEMPERATURE: 98 F | BODY MASS INDEX: 28.35 KG/M2

## 2019-05-06 DIAGNOSIS — M25.562 ACUTE PAIN OF LEFT KNEE: ICD-10-CM

## 2019-05-06 DIAGNOSIS — M25.562 ACUTE PAIN OF LEFT KNEE: Primary | ICD-10-CM

## 2019-05-06 PROCEDURE — 73562 X-RAY EXAM OF KNEE 3: CPT | Mod: LT | Performed by: INTERNAL MEDICINE

## 2019-05-06 PROCEDURE — 99213 OFFICE O/P EST LOW 20 MIN: CPT | Performed by: PHYSICIAN ASSISTANT

## 2019-05-06 ASSESSMENT — MIFFLIN-ST. JEOR: SCORE: 1374.89
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
## 2019-05-13 ENCOUNTER — ANCILLARY PROCEDURE (OUTPATIENT)
Dept: MRI IMAGING | Facility: CLINIC | Age: 39
End: 2019-05-13
Attending: PHYSICIAN ASSISTANT
Payer: COMMERCIAL

## 2019-05-13 DIAGNOSIS — M25.562 ACUTE PAIN OF LEFT KNEE: ICD-10-CM

## 2019-05-13 PROCEDURE — 73721 MRI JNT OF LWR EXTRE W/O DYE: CPT | Mod: TC

## 2019-05-14 DIAGNOSIS — M25.562 LEFT KNEE PAIN, UNSPECIFIED CHRONICITY: Primary | ICD-10-CM

## 2019-05-30 ENCOUNTER — OFFICE VISIT (OUTPATIENT)
Dept: ORTHOPEDICS | Facility: CLINIC | Age: 39
End: 2019-05-30
Payer: COMMERCIAL

## 2019-05-30 VITALS
WEIGHT: 161.9 LBS | DIASTOLIC BLOOD PRESSURE: 77 MMHG | BODY MASS INDEX: 28.69 KG/M2 | SYSTOLIC BLOOD PRESSURE: 115 MMHG | HEIGHT: 63 IN | RESPIRATION RATE: 16 BRPM | HEART RATE: 90 BPM

## 2019-05-30 DIAGNOSIS — S89.92XA KNEE INJURY, LEFT, INITIAL ENCOUNTER: ICD-10-CM

## 2019-05-30 DIAGNOSIS — M25.562 ACUTE PAIN OF LEFT KNEE: Primary | ICD-10-CM

## 2019-05-30 PROCEDURE — 99244 OFF/OP CNSLTJ NEW/EST MOD 40: CPT | Performed by: ORTHOPAEDIC SURGERY

## 2019-05-30 RX ORDER — OMEGA-3 FATTY ACIDS/FISH OIL 300-1000MG
200 CAPSULE ORAL EVERY 4 HOURS PRN
COMMUNITY

## 2019-05-30 RX ORDER — ACETAMINOPHEN 325 MG/1
325-650 TABLET ORAL EVERY 6 HOURS PRN
COMMUNITY

## 2019-05-30 ASSESSMENT — MIFFLIN-ST. JEOR: SCORE: 1383.5

## 2019-05-30 ASSESSMENT — PAIN SCALES - GENERAL: PAINLEVEL: EXTREME PAIN (9)
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
## 2019-06-17 ENCOUNTER — THERAPY VISIT (OUTPATIENT)
Dept: PHYSICAL THERAPY | Facility: CLINIC | Age: 39
End: 2019-06-17
Payer: COMMERCIAL

## 2019-06-17 DIAGNOSIS — G89.29 CHRONIC PAIN OF LEFT KNEE: ICD-10-CM

## 2019-06-17 DIAGNOSIS — M25.562 CHRONIC PAIN OF LEFT KNEE: ICD-10-CM

## 2019-06-17 PROCEDURE — 97110 THERAPEUTIC EXERCISES: CPT | Mod: GP | Performed by: PHYSICAL THERAPIST

## 2019-06-17 PROCEDURE — 97161 PT EVAL LOW COMPLEX 20 MIN: CPT | Mod: GP | Performed by: PHYSICAL THERAPIST

## 2019-06-17 ASSESSMENT — ACTIVITIES OF DAILY LIVING (ADL)
RISE FROM A CHAIR: ACTIVITY IS SOMEWHAT DIFFICULT
SWELLING: I DO NOT HAVE THE SYMPTOM
WEAKNESS: THE SYMPTOM AFFECTS MY ACTIVITY SLIGHTLY
LIMPING: THE SYMPTOM AFFECTS MY ACTIVITY SLIGHTLY
STIFFNESS: THE SYMPTOM AFFECTS MY ACTIVITY SLIGHTLY
RAW_SCORE: 41
STAND: ACTIVITY IS FAIRLY DIFFICULT
AS_A_RESULT_OF_YOUR_KNEE_INJURY,_HOW_WOULD_YOU_RATE_YOUR_CURRENT_LEVEL_OF_DAILY_ACTIVITY?: ABNORMAL
KNEE_ACTIVITY_OF_DAILY_LIVING_SUM: 41
SIT WITH YOUR KNEE BENT: ACTIVITY IS NOT DIFFICULT
HOW_WOULD_YOU_RATE_THE_OVERALL_FUNCTION_OF_YOUR_KNEE_DURING_YOUR_USUAL_DAILY_ACTIVITIES?: ABNORMAL
KNEE_ACTIVITY_OF_DAILY_LIVING_SCORE: 58.57
GO UP STAIRS: ACTIVITY IS VERY DIFFICULT
GO DOWN STAIRS: ACTIVITY IS VERY DIFFICULT
GIVING WAY, BUCKLING OR SHIFTING OF KNEE: I DO NOT HAVE THE SYMPTOM
SQUAT: ACTIVITY IS FAIRLY DIFFICULT
HOW_WOULD_YOU_RATE_THE_CURRENT_FUNCTION_OF_YOUR_KNEE_DURING_YOUR_USUAL_DAILY_ACTIVITIES_ON_A_SCALE_FROM_0_TO_100_WITH_100_BEING_YOUR_LEVEL_OF_KNEE_FUNCTION_PRIOR_TO_YOUR_INJURY_AND_0_BEING_THE_INABILITY_TO_PERFORM_ANY_OF_YOUR_USUAL_DAILY_ACTIVITIES?: 70
KNEEL ON THE FRONT OF YOUR KNEE: ACTIVITY IS FAIRLY DIFFICULT
WALK: ACTIVITY IS MINIMALLY DIFFICULT
PAIN: THE SYMPTOM AFFECTS MY ACTIVITY MODERATELY
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
## 2019-06-24 ENCOUNTER — THERAPY VISIT (OUTPATIENT)
Dept: PHYSICAL THERAPY | Facility: CLINIC | Age: 39
End: 2019-06-24
Payer: COMMERCIAL

## 2019-06-24 DIAGNOSIS — G89.29 CHRONIC PAIN OF LEFT KNEE: ICD-10-CM

## 2019-06-24 DIAGNOSIS — M25.562 CHRONIC PAIN OF LEFT KNEE: ICD-10-CM

## 2019-06-24 PROCEDURE — 97530 THERAPEUTIC ACTIVITIES: CPT | Mod: GP | Performed by: PHYSICAL THERAPIST

## 2019-06-24 PROCEDURE — 97110 THERAPEUTIC EXERCISES: CPT | Mod: GP | Performed by: PHYSICAL THERAPIST

## 2019-07-08 ENCOUNTER — THERAPY VISIT (OUTPATIENT)
Dept: PHYSICAL THERAPY | Facility: CLINIC | Age: 39
End: 2019-07-08
Payer: COMMERCIAL

## 2019-07-08 DIAGNOSIS — M25.562 CHRONIC PAIN OF LEFT KNEE: ICD-10-CM

## 2019-07-08 DIAGNOSIS — G89.29 CHRONIC PAIN OF LEFT KNEE: ICD-10-CM

## 2019-07-08 PROCEDURE — 97110 THERAPEUTIC EXERCISES: CPT | Mod: GP | Performed by: PHYSICAL THERAPIST

## 2019-07-08 PROCEDURE — 97530 THERAPEUTIC ACTIVITIES: CPT | Mod: GP | Performed by: PHYSICAL THERAPIST

## 2019-07-22 ENCOUNTER — THERAPY VISIT (OUTPATIENT)
Dept: PHYSICAL THERAPY | Facility: CLINIC | Age: 39
End: 2019-07-22
Payer: COMMERCIAL

## 2019-07-22 DIAGNOSIS — G89.29 CHRONIC PAIN OF LEFT KNEE: ICD-10-CM

## 2019-07-22 DIAGNOSIS — M25.562 CHRONIC PAIN OF LEFT KNEE: ICD-10-CM

## 2019-07-22 PROCEDURE — 97530 THERAPEUTIC ACTIVITIES: CPT | Mod: GP | Performed by: PHYSICAL THERAPIST

## 2019-07-22 PROCEDURE — 97110 THERAPEUTIC EXERCISES: CPT | Mod: GP | Performed by: PHYSICAL THERAPIST

## 2019-08-20 ENCOUNTER — THERAPY VISIT (OUTPATIENT)
Dept: PHYSICAL THERAPY | Facility: CLINIC | Age: 39
End: 2019-08-20
Payer: COMMERCIAL

## 2019-08-20 DIAGNOSIS — M25.562 CHRONIC PAIN OF LEFT KNEE: ICD-10-CM

## 2019-08-20 DIAGNOSIS — G89.29 CHRONIC PAIN OF LEFT KNEE: ICD-10-CM

## 2019-08-20 PROCEDURE — 97530 THERAPEUTIC ACTIVITIES: CPT | Mod: GP | Performed by: PHYSICAL THERAPIST

## 2019-08-20 PROCEDURE — 97110 THERAPEUTIC EXERCISES: CPT | Mod: GP | Performed by: PHYSICAL THERAPIST

## 2019-08-20 ASSESSMENT — ACTIVITIES OF DAILY LIVING (ADL)
SWELLING: I DO NOT HAVE THE SYMPTOM
HOW_WOULD_YOU_RATE_THE_CURRENT_FUNCTION_OF_YOUR_KNEE_DURING_YOUR_USUAL_DAILY_ACTIVITIES_ON_A_SCALE_FROM_0_TO_100_WITH_100_BEING_YOUR_LEVEL_OF_KNEE_FUNCTION_PRIOR_TO_YOUR_INJURY_AND_0_BEING_THE_INABILITY_TO_PERFORM_ANY_OF_YOUR_USUAL_DAILY_ACTIVITIES?: 80
GIVING WAY, BUCKLING OR SHIFTING OF KNEE: I DO NOT HAVE THE SYMPTOM
KNEE_ACTIVITY_OF_DAILY_LIVING_SUM: 61
WALK: ACTIVITY IS NOT DIFFICULT
KNEEL ON THE FRONT OF YOUR KNEE: ACTIVITY IS NOT DIFFICULT
RISE FROM A CHAIR: ACTIVITY IS NOT DIFFICULT
GO UP STAIRS: ACTIVITY IS NOT DIFFICULT
RAW_SCORE: 61
WEAKNESS: I DO NOT HAVE THE SYMPTOM
KNEE_ACTIVITY_OF_DAILY_LIVING_SCORE: 87.14
SIT WITH YOUR KNEE BENT: ACTIVITY IS NOT DIFFICULT
PAIN: I HAVE THE SYMPTOM BUT IT DOES NOT AFFECT MY ACTIVITY
STAND: ACTIVITY IS SOMEWHAT DIFFICULT
STIFFNESS: I HAVE THE SYMPTOM BUT IT DOES NOT AFFECT MY ACTIVITY
SQUAT: ACTIVITY IS SOMEWHAT DIFFICULT
LIMPING: I HAVE THE SYMPTOM BUT IT DOES NOT AFFECT MY ACTIVITY
GO DOWN STAIRS: ACTIVITY IS SOMEWHAT DIFFICULT
HOW_WOULD_YOU_RATE_THE_OVERALL_FUNCTION_OF_YOUR_KNEE_DURING_YOUR_USUAL_DAILY_ACTIVITIES?: NEARLY NORMAL
AS_A_RESULT_OF_YOUR_KNEE_INJURY,_HOW_WOULD_YOU_RATE_YOUR_CURRENT_LEVEL_OF_DAILY_ACTIVITY?: NEARLY NORMAL

## 2019-09-23 ENCOUNTER — THERAPY VISIT (OUTPATIENT)
Dept: PHYSICAL THERAPY | Facility: CLINIC | Age: 39
End: 2019-09-23
Payer: COMMERCIAL

## 2019-09-23 DIAGNOSIS — M25.562 CHRONIC PAIN OF LEFT KNEE: ICD-10-CM

## 2019-09-23 DIAGNOSIS — G89.29 CHRONIC PAIN OF LEFT KNEE: ICD-10-CM

## 2019-09-23 PROCEDURE — 97110 THERAPEUTIC EXERCISES: CPT | Mod: GP | Performed by: PHYSICAL THERAPIST

## 2019-09-23 PROCEDURE — 97530 THERAPEUTIC ACTIVITIES: CPT | Mod: GP | Performed by: PHYSICAL THERAPIST

## 2019-09-23 ASSESSMENT — ACTIVITIES OF DAILY LIVING (ADL)
PAIN: I HAVE THE SYMPTOM BUT IT DOES NOT AFFECT MY ACTIVITY
HOW_WOULD_YOU_RATE_THE_OVERALL_FUNCTION_OF_YOUR_KNEE_DURING_YOUR_USUAL_DAILY_ACTIVITIES?: NEARLY NORMAL
STIFFNESS: I DO NOT HAVE THE SYMPTOM
GIVING WAY, BUCKLING OR SHIFTING OF KNEE: I DO NOT HAVE THE SYMPTOM
LIMPING: I DO NOT HAVE THE SYMPTOM
GO DOWN STAIRS: ACTIVITY IS MINIMALLY DIFFICULT
STAND: ACTIVITY IS MINIMALLY DIFFICULT
KNEEL ON THE FRONT OF YOUR KNEE: ACTIVITY IS SOMEWHAT DIFFICULT
SIT WITH YOUR KNEE BENT: ACTIVITY IS NOT DIFFICULT
RISE FROM A CHAIR: ACTIVITY IS NOT DIFFICULT
KNEE_ACTIVITY_OF_DAILY_LIVING_SUM: 63
SWELLING: I DO NOT HAVE THE SYMPTOM
SQUAT: ACTIVITY IS SOMEWHAT DIFFICULT
RAW_SCORE: 63
HOW_WOULD_YOU_RATE_THE_CURRENT_FUNCTION_OF_YOUR_KNEE_DURING_YOUR_USUAL_DAILY_ACTIVITIES_ON_A_SCALE_FROM_0_TO_100_WITH_100_BEING_YOUR_LEVEL_OF_KNEE_FUNCTION_PRIOR_TO_YOUR_INJURY_AND_0_BEING_THE_INABILITY_TO_PERFORM_ANY_OF_YOUR_USUAL_DAILY_ACTIVITIES?: 3
KNEE_ACTIVITY_OF_DAILY_LIVING_SCORE: 90
WEAKNESS: I DO NOT HAVE THE SYMPTOM
WALK: ACTIVITY IS NOT DIFFICULT
GO UP STAIRS: ACTIVITY IS NOT DIFFICULT

## 2019-09-23 NOTE — PROGRESS NOTES
Subjective:  HPI                    Objective:  System    Physical Exam    General     ROS    Assessment/Plan:    PROGRESS  REPORT    Progress reporting period is from 6/17/19 to 9/23/19.       SUBJECTIVE  Patient reports significant improvement in her left knee since the start of therapy. Pain has decreased in severity. Some days, she has no pain at all.  Standing tolerance has increased to approximately 2 hours.  Now able to ascend stairs without difficulty; however, descending stairs is still bothersome at times.  No longer has to support thigh to bend knee after sitting with knee in sustained extension.    Current pain level is 0/10  .     Previous pain level was  8/10  .   Changes in function:  Yes (See Goal flowsheet attached for changes in current functional level)  Adverse reaction to treatment or activity: None    OBJECTIVE  Right Knee AROM: WNL  Strength R LE: hip flexors 4+/5, Quads 5/5, HS 4+/5,   Gait: normal   Palpation: unremarkable for any tenderness over left popliteal fossa, distal HS tendons, medial/lateral joint line, or patella.      ASSESSMENT/PLAN  STG/LTGs have been met or progress has been made towards goals:  Yes (See Goal flow sheet completed today.)  Assessment of Progress: The patient has met all of her long term goals.  Progress, however, was slower than expected due to 4 week  gaps at times between appointments  Self Management Plans:  Patient has been instructed in a home treatment program.  PT intervention is no longer required to meet STG/LTG.    Recommendations:  This patient is ready to be discharged from therapy and continue their home treatment program.    Please refer to the daily flowsheet for treatment today, total treatment time and time spent performing 1:1 timed codes.

## 2019-10-09 ENCOUNTER — OFFICE VISIT (OUTPATIENT)
Dept: URGENT CARE | Facility: URGENT CARE | Age: 39
End: 2019-10-09
Payer: COMMERCIAL

## 2019-10-09 VITALS
WEIGHT: 160 LBS | TEMPERATURE: 99.3 F | OXYGEN SATURATION: 100 % | HEART RATE: 95 BPM | DIASTOLIC BLOOD PRESSURE: 79 MMHG | SYSTOLIC BLOOD PRESSURE: 124 MMHG | BODY MASS INDEX: 28.34 KG/M2 | RESPIRATION RATE: 16 BRPM

## 2019-10-09 DIAGNOSIS — R05.9 COUGH: Primary | ICD-10-CM

## 2019-10-09 DIAGNOSIS — R07.89 CHEST WALL PAIN: ICD-10-CM

## 2019-10-09 PROCEDURE — 99214 OFFICE O/P EST MOD 30 MIN: CPT | Performed by: INTERNAL MEDICINE

## 2019-10-09 RX ORDER — BENZONATATE 200 MG/1
200 CAPSULE ORAL 3 TIMES DAILY PRN
Qty: 30 CAPSULE | Refills: 0 | Status: SHIPPED | OUTPATIENT
Start: 2019-10-09 | End: 2020-09-23 | Stop reason: ALTCHOICE

## 2019-10-09 RX ORDER — DEXTROMETHORPHAN POLISTIREX 30 MG/5ML
60 SUSPENSION ORAL 2 TIMES DAILY
COMMUNITY
End: 2021-04-28 | Stop reason: ALTCHOICE

## 2019-10-09 RX ORDER — AZITHROMYCIN 250 MG/1
TABLET, FILM COATED ORAL
Qty: 6 TABLET | Refills: 0 | Status: SHIPPED | OUTPATIENT
Start: 2019-10-09 | End: 2019-11-13

## 2019-10-09 RX ORDER — ALBUTEROL SULFATE 90 UG/1
2 AEROSOL, METERED RESPIRATORY (INHALATION) EVERY 4 HOURS PRN
Qty: 1 INHALER | Refills: 0 | Status: SHIPPED | OUTPATIENT
Start: 2019-10-09 | End: 2020-09-23 | Stop reason: ALTCHOICE

## 2019-10-09 ASSESSMENT — PAIN SCALES - GENERAL: PAINLEVEL: SEVERE PAIN (6)

## 2019-10-09 NOTE — NURSING NOTE
"Chief Complaint   Patient presents with     Cough     c/o cough x 6 days, fever last night and pain in back and chest w/ cough       Initial /79   Pulse 95   Temp 99.3  F (37.4  C) (Tympanic)   Resp 16   Wt 72.6 kg (160 lb)   SpO2 100%   Breastfeeding? No   BMI 28.34 kg/m   Estimated body mass index is 28.34 kg/m  as calculated from the following:    Height as of 5/30/19: 1.6 m (5' 3\").    Weight as of this encounter: 72.6 kg (160 lb).  Medication Reconciliation: complete  Patient and/or MA were masked during rooming process  Stevo Price MA        "

## 2019-10-09 NOTE — LETTER
October 9, 2019      Brynn Sprague  [address]  [place] [address]        To Whom It May Concern:    Brynn Sprague was seen in our clinic. She missed work on 10/9/2019 due to illness and may need to miss an additional two days.  When her symptoms have improved, she may return to work without restrictions.      Sincerely,        Michelle Saldivar MD

## 2019-10-09 NOTE — PROGRESS NOTES
SUBJECTIVE:  Brynn Sprague is an 38 year old female who presents for cough.  Has pain in back and chest when coughs.  Has had cough for about a week, but has worsened over the past three days.  Used otc cough med which didn't help.  Cough is a little productive today.  Fever to 100 which some chills and sweats yesterday.  No nasal congestion.  No ear pain.  Some nausea, no vomiting. Some loose stools yesterday but not today.  No known exposures.  No recent travel.  No skin rashes.    PMH:   has a past medical history of Chronic neutropenia (H), GERD (gastroesophageal reflux disease), Left renal mass, Normal pregnancy (2006,08,09,12), Pulmonary nodules, and Renal cell carcinoma (H).  Patient Active Problem List   Diagnosis     CARDIOVASCULAR SCREENING; LDL GOAL LESS THAN 160     Cervical polyp     Impingement syndrome of right shoulder     Renal cancer (H)     Social History     Socioeconomic History     Marital status:      Spouse name: Carmen Sahni     Number of children: 4     Years of education: None     Highest education level: None   Occupational History     Occupation: [employer] management     Employer: [employer]   Social Needs     Financial resource strain: None     Food insecurity:     Worry: None     Inability: None     Transportation needs:     Medical: None     Non-medical: None   Tobacco Use     Smoking status: Never Smoker     Smokeless tobacco: Never Used   Substance and Sexual Activity     Alcohol use: No     Drug use: No     Sexual activity: Yes     Partners: Male     Birth control/protection: I.U.D.   Lifestyle     Physical activity:     Days per week: None     Minutes per session: None     Stress: None   Relationships     Social connections:     Talks on phone: None     Gets together: None     Attends Gnosticist service: None     Active member of club or organization: None     Attends meetings of clubs or organizations: None     Relationship status: None     Intimate partner  violence:     Fear of current or ex partner: None     Emotionally abused: None     Physically abused: None     Forced sexual activity: None   Other Topics Concern     Parent/sibling w/ CABG, MI or angioplasty before 65F 55M? No   Social History Narrative     None     Family History   Problem Relation Age of Onset     Family History Negative Mother      Family History Negative Father      Cancer No family hx of        ALLERGIES:  Nkda [no known drug allergies]    Current Outpatient Medications   Medication     dextromethorphan (DELSYM) 30 MG/5ML liquid     levonorgestrel (MIRENA) 20 MCG/24HR IUD     acetaminophen (TYLENOL) 325 MG tablet     ibuprofen (ADVIL/MOTRIN) 200 MG capsule     No current facility-administered medications for this visit.          ROS:  ROS is done and is negative for general/constitutional, eye, ENT, Respiratory, cardiovascular, GI, , Skin, musculoskeletal except as noted elsewhere.  All other review of systems negative except as noted elsewhere.      OBJECTIVE:  /79   Pulse 95   Temp 99.3  F (37.4  C) (Tympanic)   Resp 16   Wt 72.6 kg (160 lb)   SpO2 100%   Breastfeeding? No   BMI 28.34 kg/m    GENERAL APPEARANCE: Alert, in no acute distress  EYES: normal  EARS: External ears normal. Canals clear. TM's normal.  NOSE:mild clear discharge and mildly inflamed mucosa  OROPHARYNX:normal  NECK:No adenopathy,masses or thyromegaly  RESP: normal and clear to auscultation  CV:regular rate and rhythm and no murmurs, clicks, or gallops  ABDOMEN: Abdomen soft, non-tender. BS normal. No masses, organomegaly  SKIN: no ulcers, lesions or rash  MUSCULOSKELETAL:chest wall  Mildly tender over upper portion, no bruising      RESULTS  .  No results found for this or any previous visit (from the past 48 hour(s)).    ASSESSMENT/PLAN:    ASSESSMENT / PLAN:  (R05) Cough  (primary encounter diagnosis)  Comment: may be viral but cannot r/o atypical, so will tx with zmax.  Plan: azithromycin (ZITHROMAX)  250 MG tablet,         benzonatate (TESSALON) 200 MG capsule,         albuterol (PROAIR HFA/PROVENTIL HFA/VENTOLIN         HFA) 108 (90 Base) MCG/ACT inhaler        Reviewed medication instructions and side effects. Follow up if experiences side effects.. I reviewed supportive care, otc meds to use if needed, expected course, and signs of concern.  Follow up as needed or if she does not improve within 7 day(s) or if worsens in any way.  Reviewed red flag symptoms and is to go to the ER if experiences any of these.    (R07.89) Chest wall pain  Comment: currently c/w muscular pain, likely from her coughing so much  Plan: advised ibuprofen and icing for pain.  Cough management as above.      See [facility] for orders, medications, letters, patient instructions    Michelle Saldivar M.D.

## 2019-10-11 ENCOUNTER — PRE VISIT (OUTPATIENT)
Dept: UROLOGY | Facility: CLINIC | Age: 39
End: 2019-10-11

## 2019-10-11 NOTE — TELEPHONE ENCOUNTER
Chief Complaint : 12 month f/u with labs and imaging before    Records/Orders: labs and imaging before    Pt Contacted: no    At Rooming: normal

## 2019-11-13 ENCOUNTER — ANCILLARY PROCEDURE (OUTPATIENT)
Dept: CT IMAGING | Facility: CLINIC | Age: 39
End: 2019-11-13
Attending: UROLOGY
Payer: COMMERCIAL

## 2019-11-13 ENCOUNTER — ANCILLARY PROCEDURE (OUTPATIENT)
Dept: GENERAL RADIOLOGY | Facility: CLINIC | Age: 39
End: 2019-11-13
Attending: UROLOGY
Payer: COMMERCIAL

## 2019-11-13 ENCOUNTER — OFFICE VISIT (OUTPATIENT)
Dept: UROLOGY | Facility: CLINIC | Age: 39
End: 2019-11-13
Payer: COMMERCIAL

## 2019-11-13 VITALS
DIASTOLIC BLOOD PRESSURE: 73 MMHG | WEIGHT: 160 LBS | SYSTOLIC BLOOD PRESSURE: 106 MMHG | HEART RATE: 83 BPM | HEIGHT: 63 IN | BODY MASS INDEX: 28.35 KG/M2

## 2019-11-13 DIAGNOSIS — C64.1 MALIGNANT NEOPLASM OF RIGHT KIDNEY (H): ICD-10-CM

## 2019-11-13 DIAGNOSIS — C64.1 MALIGNANT NEOPLASM OF RIGHT KIDNEY (H): Primary | ICD-10-CM

## 2019-11-13 LAB
ANION GAP SERPL CALCULATED.3IONS-SCNC: 4 MMOL/L (ref 3–14)
BASOPHILS # BLD AUTO: 0 10E9/L (ref 0–0.2)
BASOPHILS NFR BLD AUTO: 0.8 %
BUN SERPL-MCNC: 6 MG/DL (ref 7–30)
CALCIUM SERPL-MCNC: 8.6 MG/DL (ref 8.5–10.1)
CHLORIDE SERPL-SCNC: 107 MMOL/L (ref 94–109)
CO2 SERPL-SCNC: 28 MMOL/L (ref 20–32)
CREAT BLD-MCNC: 0.6 MG/DL (ref 0.52–1.04)
CREAT SERPL-MCNC: 0.56 MG/DL (ref 0.52–1.04)
DIFFERENTIAL METHOD BLD: ABNORMAL
EOSINOPHIL # BLD AUTO: 0.1 10E9/L (ref 0–0.7)
EOSINOPHIL NFR BLD AUTO: 2.7 %
ERYTHROCYTE [DISTWIDTH] IN BLOOD BY AUTOMATED COUNT: 12.1 % (ref 10–15)
GFR SERPL CREATININE-BSD FRML MDRD: >90 ML/MIN/{1.73_M2}
GFR SERPL CREATININE-BSD FRML MDRD: >90 ML/MIN/{1.73_M2}
GLUCOSE SERPL-MCNC: 95 MG/DL (ref 70–99)
HCT VFR BLD AUTO: 43.1 % (ref 35–47)
HGB BLD-MCNC: 14.7 G/DL (ref 11.7–15.7)
IMM GRANULOCYTES # BLD: 0 10E9/L (ref 0–0.4)
IMM GRANULOCYTES NFR BLD: 0.4 %
LYMPHOCYTES # BLD AUTO: 1.3 10E9/L (ref 0.8–5.3)
LYMPHOCYTES NFR BLD AUTO: 49.4 %
MCH RBC QN AUTO: 31.6 PG (ref 26.5–33)
MCHC RBC AUTO-ENTMCNC: 34.1 G/DL (ref 31.5–36.5)
MCV RBC AUTO: 93 FL (ref 78–100)
MONOCYTES # BLD AUTO: 0.3 10E9/L (ref 0–1.3)
MONOCYTES NFR BLD AUTO: 10.6 %
NEUTROPHILS # BLD AUTO: 1 10E9/L (ref 1.6–8.3)
NEUTROPHILS NFR BLD AUTO: 36.1 %
NRBC # BLD AUTO: 0 10*3/UL
NRBC BLD AUTO-RTO: 0 /100
PLATELET # BLD AUTO: 181 10E9/L (ref 150–450)
POTASSIUM SERPL-SCNC: 3.8 MMOL/L (ref 3.4–5.3)
RBC # BLD AUTO: 4.65 10E12/L (ref 3.8–5.2)
SODIUM SERPL-SCNC: 139 MMOL/L (ref 133–144)
WBC # BLD AUTO: 2.6 10E9/L (ref 4–11)

## 2019-11-13 RX ORDER — IOPAMIDOL 755 MG/ML
99 INJECTION, SOLUTION INTRAVASCULAR ONCE
Status: COMPLETED | OUTPATIENT
Start: 2019-11-13 | End: 2019-11-13

## 2019-11-13 RX ADMIN — IOPAMIDOL 99 ML: 755 INJECTION, SOLUTION INTRAVASCULAR at 07:35

## 2019-11-13 ASSESSMENT — PAIN SCALES - GENERAL: PAINLEVEL: NO PAIN (0)

## 2019-11-13 ASSESSMENT — MIFFLIN-ST. JEOR: SCORE: 1369.89

## 2019-11-13 NOTE — PATIENT INSTRUCTIONS
Follow up in one year with Dr. Cross and your imaging.    It was a pleasure meeting with you today.  Thank you for allowing me and my team the privilege of caring for you today.  YOU are the reason we are here, and I truly hope we provided you with the excellent service you deserve.  Please let us know if there is anything else we can do for you so that we can be sure you are leaving completely satisfied with your care experience.        Prudencio Davis, EMT

## 2019-11-13 NOTE — PROGRESS NOTES
"  Urology Clinic    Jairo Cross MD  Date of Service: 2019     Name: Brynn Sprague  MRN: [id number]  Age: 39 year old  : 1980  Referring provider: Established Patient     Assessment and Plan:   Hx of Renal Cell Cancer [id number], chromophobe, s/p Robotic Partial Nephrectomy on 2017. No evidence of recurrence.      Follow up in 12 months with:    CT of Abd with and without IV contrast, no oral contrast needed    Chest CT scan    BMP, CBC    Attestation:  This patient was seen and evaluated by me, with a scribe taking notes.  I have reviewed the note above and agree.  The physical exam and or any procedures were performed by me and the pertinant details are outlined below.       Jairo Cross MD  Department of Urology  [facility]    ______________________________________________________________________    HPI  Kidney Cancer Follow Up      Brynn Sprague is a very pleasant 39 year old female who presents with a history of a Renal Cell Cancer.      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    ISUP Grade: na  Pathologic Stage T2a see below*.    Maximal tumor dimension was 8.7 cm.    Tumor thrombus level  not applicable.    Tumor necrosis present: No  Sarcomatoid features present: No  Patient is status post Robotic Partial Nephrectomy on 2017.   Tumor was on the left side.     There is no evidence of disease recurrence to date.    Today she is doing well. She reports that incisions are healing well.     Review of Systems:   Pertinent items are noted in HPI or as below, remainder of complete ROS is negative.      Physical Exam:   Patient is a 39 year old  female   Vitals: Blood pressure 106/73, pulse 83, height 1.6 m (5' 3\"), weight 72.6 kg (160 lb), not currently breastfeeding.  General Appearance Adult: Alert, no acute distress, oriented  HENT: throat/mouth:normal, good dentition  Lungs: no respiratory distress, or pursed lip breathing  Heart: No obvious jugular " venous distension present  Abdomen: Body mass index is 28.34 kg/m .  Musculoskeltal: extremities normal  Skin: no visible suspicious lesions or rashes  Neuro: Alert, oriented, speech and mentation normal  Psych: affect and mood normal  Gait: Normal  : deferred    Laboratory:   I reviewed all applicable laboratory and pathology data and went over findings with patient  Significant for     Lab Results   Component Value Date    CR 0.56 11/13/2019    CR 0.64 10/18/2018    CR 0.59 01/11/2018    CR 0.63 10/13/2017    CR 0.54 09/07/2017    CR 0.51 06/01/2017    CR 0.61 01/19/2017    CR 0.57 01/18/2017    CR 0.55 01/17/2017    CR 0.54 01/16/2017       Imaging:   I personally reviewed all applicable imaging and went over the below findings with patient.    No evidence of recurrence on CT abdomen and chest x-ray. Radiology report pending.     Scribe Disclosure:  I, Susan Alex, am serving as a scribe to document services personally performed by Jairo Cross MD at this visit, based upon the provider's statements to me. All documentation has been reviewed by the aforementioned provider prior to being entered into the official medical record.

## 2019-11-13 NOTE — LETTER
2019       RE: Brynn Sprague  [address]  [place] [address]     Dear Colleague,    Thank you for referring your patient, Brynn Sprague, to the [facility] UROLOGY AND [facility] FOR PROSTATE AND UROLOGIC CANCERS at [facility]. Please see a copy of my visit note below.      Urology Clinic    Jairo Cross MD  Date of Service: 2019     Name: Brynn Sprague  MRN: [id number]  Age: 39 year old  : 1980  Referring provider: Established Patient     Assessment and Plan:   Hx of Renal Cell Cancer [id number], chromophobe, s/p Robotic Partial Nephrectomy on 2017. No evidence of recurrence.      Follow up in 12 months with:    CT of Abd with and without IV contrast, no oral contrast needed    Chest CT scan    BMP, CBC    Attestation:  This patient was seen and evaluated by me, with a scribe taking notes.  I have reviewed the note above and agree.  The physical exam and or any procedures were performed by me and the pertinant details are outlined below.       Jairo Cross MD  Department of Urology  [facility]    ______________________________________________________________________    HPI  Kidney Cancer Follow Up      Brynn Sprague is a very pleasant 39 year old female who presents with a history of a Renal Cell Cancer.      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    ISUP Grade: na  Pathologic Stage T2a see below*.    Maximal tumor dimension was 8.7 cm.    Tumor thrombus level  not applicable.    Tumor necrosis present: No  Sarcomatoid features present: No  Patient is status post Robotic Partial Nephrectomy on 2017.   Tumor was on the left side.     There is no evidence of disease recurrence to date.    Today she is doing well. She reports that incisions are healing well.     Review of Systems:   Pertinent items are noted in HPI or as below, remainder of complete ROS is negative.      Physical Exam:   Patient is a 39 year old   "female   Vitals: Blood pressure 106/73, pulse 83, height 1.6 m (5' 3\"), weight 72.6 kg (160 lb), not currently breastfeeding.  General Appearance Adult: Alert, no acute distress, oriented  HENT: throat/mouth:normal, good dentition  Lungs: no respiratory distress, or pursed lip breathing  Heart: No obvious jugular venous distension present  Abdomen: Body mass index is 28.34 kg/m .  Musculoskeltal: extremities normal  Skin: no visible suspicious lesions or rashes  Neuro: Alert, oriented, speech and mentation normal  Psych: affect and mood normal  Gait: Normal  : deferred    Laboratory:   I reviewed all applicable laboratory and pathology data and went over findings with patient  Significant for     Lab Results   Component Value Date    CR 0.56 11/13/2019    CR 0.64 10/18/2018    CR 0.59 01/11/2018    CR 0.63 10/13/2017    CR 0.54 09/07/2017    CR 0.51 06/01/2017    CR 0.61 01/19/2017    CR 0.57 01/18/2017    CR 0.55 01/17/2017    CR 0.54 01/16/2017       Imaging:   I personally reviewed all applicable imaging and went over the below findings with patient.    No evidence of recurrence on CT abdomen and chest x-ray. Radiology report pending.     Scribe Disclosure:  I, Susan Alex, am serving as a scribe to document services personally performed by Jairo Cross MD at this visit, based upon the provider's statements to me. All documentation has been reviewed by the aforementioned provider prior to being entered into the official medical record.     Again, thank you for allowing me to participate in the care of your patient.      Sincerely,    Jairo Cross MD      "

## 2020-09-23 NOTE — PROGRESS NOTES
SUBJECTIVE:   CC: Brynn Sprague is an 39 year old woman who presents for preventive health visit.       Patient has been advised of split billing requirements and indicates understanding: Yes       Patient would still like to discuss the following concern(s):  1. Birth control  2. IUD- has had IUD since 2015, would like referral for new IUD placement.  Will do PAP at same time.   3. Are labs needed?  4. Hx of left renal cancer, having R flank pain      Healthy Habits:    Do you get at least three servings of calcium containing foods daily (dairy, green leafy vegetables, etc.)? yes    Amount of exercise or daily activities, outside of work: 4-5 day(s) per week    Problems taking medications regularly not applicable    Medication side effects: No    Have you had an eye exam in the past two years? no    Do you see a dentist twice per year? yes    Do you have sleep apnea, excessive snoring or daytime drowsiness?no      PROBLEMS TO ADD ON...  Birth control     Today's PHQ-2 Score:   PHQ-2 ( 1999 Pfizer) 9/23/2020 12/16/2016   Q1: Little interest or pleasure in doing things 0 0   Q2: Feeling down, depressed or hopeless 0 0   PHQ-2 Score 0 0       Abuse: Current or Past(Physical, Sexual or Emotional)- No  Do you feel safe in your environment? Yes        Social History     Tobacco Use     Smoking status: Never Smoker     Smokeless tobacco: Never Used   Substance Use Topics     Alcohol use: No     If you drink alcohol do you typically have >3 drinks per day or >7 drinks per week? No                     Reviewed orders with patient.  Reviewed health maintenance and updated orders accordingly - Yes  Lab work is in process  Labs reviewed in EPIC  BP Readings from Last 3 Encounters:   09/24/20 109/74   11/13/19 106/73   10/09/19 124/79    Wt Readings from Last 3 Encounters:   09/24/20 71 kg (156 lb 9.6 oz)   11/13/19 72.6 kg (160 lb)   10/09/19 72.6 kg (160 lb)                  Patient Active Problem List   Diagnosis      CARDIOVASCULAR SCREENING; LDL GOAL LESS THAN 160     Cervical polyp     Impingement syndrome of right shoulder     Renal cancer (H)     Anal fissure     IUD contraception     Past Surgical History:   Procedure Laterality Date     DAVINCI NEPHRECTOMY PARTIAL Left 1/16/2017    Procedure: DAVINCI NEPHRECTOMY PARTIAL;  Surgeon: Jairo Cross MD;  Location: [facility] OR     INSERT INTRAUTERINE DEVICE  10/16/15       Social History     Tobacco Use     Smoking status: Never Smoker     Smokeless tobacco: Never Used   Substance Use Topics     Alcohol use: No     Family History   Problem Relation Age of Onset     Family History Negative Mother      Family History Negative Father      Cancer No family hx of          Current Outpatient Medications   Medication Sig Dispense Refill     acetaminophen (TYLENOL) 325 MG tablet Take 325-650 mg by mouth every 6 hours as needed for mild pain       dextromethorphan (DELSYM) 30 MG/5ML liquid Take 60 mg by mouth 2 times daily       ibuprofen (ADVIL/MOTRIN) 200 MG capsule Take 200 mg by mouth every 4 hours as needed for fever       levonorgestrel (MIRENA) 20 MCG/24HR IUD 1 each by Intrauterine route once       No Known Allergies  Recent Labs   Lab Test 09/24/20  1458 11/13/19  0734 11/13/19  0716 10/18/18  0727  10/13/17  0942  12/16/16  1143 10/28/16  0907   A1C 4.9  --   --   --   --   --   --   --   --    LDL  --   --   --   --   --  90  --   --   --    HDL  --   --   --   --   --  43*  --   --   --    TRIG  --   --   --   --   --  60  --   --   --    ALT  --   --   --   --   --   --   --  20 29   CR  --   --  0.56 0.64   < >  --    < > 0.58  --    GFRESTIMATED  --  >90 >90 >90   < >  --    < > >90  Non  GFR Calc    --    GFRESTBLACK  --  >90 >90 >90   < >  --    < > >90   GFR Calc    --    POTASSIUM  --   --  3.8 3.5   < >  --    < > 3.7  --    TSH  --   --   --   --   --  1.98  --   --   --     < > = values in this interval not displayed.         Mammogram Screening: Patient under age 50, mutual decision reflected in health maintenance.      Pertinent mammograms are reviewed under the imaging tab.  History of abnormal Pap smear: NO - age 30-65 PAP every 5 years with negative HPV co-testing recommended  PAP / HPV Latest Ref Rng & Units 2017   PAP - NIL NIL NIL   HPV 16 DNA NEG:Negative Negative - -   HPV 18 DNA NEG:Negative Negative - -   OTHER HR HPV NEG:Negative Negative - -     Reviewed and updated as needed this visit by clinical staff  Tobacco  Allergies  Meds  Med Hx  Surg Hx  Fam Hx  Soc Hx        Reviewed and updated as needed this visit by Provider        Past Medical History:   Diagnosis Date     Chronic neutropenia (H)     possibly benign essential neutropenia     GERD (gastroesophageal reflux disease)      Left renal mass      Normal pregnancy ,,,     Pulmonary nodules      Renal cell carcinoma (H)       Past Surgical History:   Procedure Laterality Date     DAVINCI NEPHRECTOMY PARTIAL Left 2017    Procedure: DAVINCI NEPHRECTOMY PARTIAL;  Surgeon: Jairo Cross MD;  Location: [facility] OR     INSERT INTRAUTERINE DEVICE  10/16/15     OB History    Para Term  AB Living   6 4 4 0 2 4   SAB TAB Ectopic Multiple Live Births   1 1 0 0 4      # Outcome Date GA Lbr Armando/2nd Weight Sex Delivery Anes PTL Lv   6 Term 12 40w3d  3.175 kg (7 lb) F    RAISSA      Birth Comments: uncomplicated      Name: Feenaa   5 Term  39w3d  3.175 kg (7 lb) M Vag-Spont   RAISSA      Birth Comments: uncomplicated      Name: Natanaan   4 Term  39w4d  3.317 kg (7 lb 5 oz) F Vag-Spont   RAISSA      Birth Comments: uncomplicated      Name: Aanatii   3 Term 2006 39w4d  3.402 kg (7 lb 8 oz) M Vag-Spont   RAISSA      Birth Comments: uncomplicated      Name: Maral Morales   2 SAB         DEC      Birth Comments: D&C   1 TAB 2001        DEC       ROS:  CONSTITUTIONAL: NEGATIVE for fever, chills, change in  "weight  INTEGUMENTARU/SKIN: NEGATIVE for worrisome rashes, moles or lesions  EYES: NEGATIVE for vision changes or irritation  ENT: NEGATIVE for ear, mouth and throat problems  RESP: NEGATIVE for significant cough or SOB  BREAST: NEGATIVE for masses, tenderness or discharge  CV: NEGATIVE for chest pain, palpitations or peripheral edema  GI: NEGATIVE for nausea, abdominal pain, heartburn, or change in bowel habits  : NEGATIVE for unusual urinary or vaginal symptoms. Periods are regular.  MUSCULOSKELETAL: NEGATIVE for significant arthralgias or myalgia POSITIVE for R flank pain  NEURO: NEGATIVE for weakness, dizziness or paresthesias  ENDOCRINE: NEGATIVE for temperature intolerance, skin/hair changes  HEME/ALLERGY/IMMUNE: NEGATIVE for bleeding problems  PSYCHIATRIC: NEGATIVE for changes in mood or affect    OBJECTIVE:   /74   Pulse 85   Temp 98.2  F (36.8  C) (Tympanic)   Resp 16   Ht 1.575 m (5' 2\")   Wt 71 kg (156 lb 9.6 oz)   SpO2 98%   BMI 28.64 kg/m    EXAM:  GENERAL: healthy, alert and no distress  EYES: Eyes grossly normal to inspection, PERRL and conjunctivae and sclerae normal  HENT: ear canals and TM's normal, nose and mouth without ulcers or lesions  NECK: no adenopathy, no asymmetry, masses, or scars and thyroid normal to palpation  RESP: lungs clear to auscultation - no rales, rhonchi or wheezes  BREAST: deferred, no concerns  CV: regular rate and rhythm, normal S1 S2, no S3 or S4, no murmur, click or rub, no peripheral edema and peripheral pulses strong  ABDOMEN: soft, nontender, no hepatosplenomegaly, no masses and bowel sounds normal    (female): pt deferred, will have pap at same time as IUD insertion  MS: no gross musculoskeletal defects noted, no edema POSITIVE for R flank tenderness- ongoing.  No urinary symptoms.   SKIN: no suspicious lesions or rashes  NEURO: Normal strength and tone, cranial nerves intact, mentation intact and speech normal  PSYCH: mentation appears normal, " "affect normal/bright    Diagnostic Test Results:  Labs reviewed in Epic  See orders-    Renal US and CBC to evaluate for R flank pain with hx of left renal cancer.     ASSESSMENT/PLAN:       ICD-10-CM    1. Right flank pain  R10.9 US Renal Complete     CBC with platelets and differential   2. Encounter for general adult medical examination with abnormal findings  Z00.01    3. Need for prophylactic vaccination and inoculation against influenza  Z23 INFLUENZA VACCINE IM > 6 MONTHS VALENT IIV4 [39798]   4. Malignant neoplasm of left kidney (H)  C64.2 US Renal Complete     CBC with platelets and differential   5. IUD contraception  Z97.5 OB/GYN REFERRAL   6. Screening for malignant neoplasm of cervix  Z12.4 OB/GYN REFERRAL   7. Screening for diabetes mellitus  Z13.1 Hemoglobin A1c   8. Screening for thyroid disorder  Z13.29 TSH with free T4 reflex   9. Lipid screening  Z13.220 Lipid panel reflex to direct LDL Non-fasting   10. Encounter for screening mammogram for breast cancer  Z12.31 *MA Screening Digital Bilateral       Patient has been advised of split billing requirements and indicates understanding: Yes  COUNSELING:   Reviewed preventive health counseling, as reflected in patient instructions    Estimated body mass index is 28.64 kg/m  as calculated from the following:    Height as of this encounter: 1.575 m (5' 2\").    Weight as of this encounter: 71 kg (156 lb 9.6 oz).    Weight management plan: Discussed healthy diet and exercise guidelines    She reports that she has never smoked. She has never used smokeless tobacco.      Counseling Resources:  ATP IV Guidelines  Pooled Cohorts Equation Calculator  Breast Cancer Risk Calculator  BRCA-Related Cancer Risk Assessment: FHS-7 Tool  FRAX Risk Assessment  ICSI Preventive Guidelines  Dietary Guidelines for Americans, 2010  USDA's MyPlate  ASA Prophylaxis  Lung CA Screening    PRIMO Graham  [facility]  "

## 2020-09-24 ENCOUNTER — OFFICE VISIT (OUTPATIENT)
Dept: FAMILY MEDICINE | Facility: CLINIC | Age: 40
End: 2020-09-24
Payer: COMMERCIAL

## 2020-09-24 VITALS
BODY MASS INDEX: 28.82 KG/M2 | DIASTOLIC BLOOD PRESSURE: 74 MMHG | HEIGHT: 62 IN | TEMPERATURE: 98.2 F | SYSTOLIC BLOOD PRESSURE: 109 MMHG | RESPIRATION RATE: 16 BRPM | OXYGEN SATURATION: 98 % | WEIGHT: 156.6 LBS | HEART RATE: 85 BPM

## 2020-09-24 DIAGNOSIS — Z00.01 ENCOUNTER FOR GENERAL ADULT MEDICAL EXAMINATION WITH ABNORMAL FINDINGS: ICD-10-CM

## 2020-09-24 DIAGNOSIS — R10.9 RIGHT FLANK PAIN: Primary | ICD-10-CM

## 2020-09-24 DIAGNOSIS — Z23 NEED FOR PROPHYLACTIC VACCINATION AND INOCULATION AGAINST INFLUENZA: ICD-10-CM

## 2020-09-24 DIAGNOSIS — Z13.1 SCREENING FOR DIABETES MELLITUS: ICD-10-CM

## 2020-09-24 DIAGNOSIS — Z13.220 LIPID SCREENING: ICD-10-CM

## 2020-09-24 DIAGNOSIS — Z13.29 SCREENING FOR THYROID DISORDER: ICD-10-CM

## 2020-09-24 DIAGNOSIS — C64.2 MALIGNANT NEOPLASM OF LEFT KIDNEY (H): ICD-10-CM

## 2020-09-24 DIAGNOSIS — Z12.31 ENCOUNTER FOR SCREENING MAMMOGRAM FOR BREAST CANCER: ICD-10-CM

## 2020-09-24 DIAGNOSIS — Z12.4 SCREENING FOR MALIGNANT NEOPLASM OF CERVIX: ICD-10-CM

## 2020-09-24 DIAGNOSIS — Z97.5 IUD CONTRACEPTION: ICD-10-CM

## 2020-09-24 LAB
BASOPHILS # BLD AUTO: 0 10E9/L (ref 0–0.2)
BASOPHILS NFR BLD AUTO: 0.4 %
DIFFERENTIAL METHOD BLD: ABNORMAL
EOSINOPHIL # BLD AUTO: 0.1 10E9/L (ref 0–0.7)
EOSINOPHIL NFR BLD AUTO: 3.4 %
ERYTHROCYTE [DISTWIDTH] IN BLOOD BY AUTOMATED COUNT: 12.2 % (ref 10–15)
HBA1C MFR BLD: 4.9 % (ref 0–5.6)
HCT VFR BLD AUTO: 41.4 % (ref 35–47)
HGB BLD-MCNC: 14.3 G/DL (ref 11.7–15.7)
LYMPHOCYTES # BLD AUTO: 1 10E9/L (ref 0.8–5.3)
LYMPHOCYTES NFR BLD AUTO: 38.9 %
MCH RBC QN AUTO: 31.8 PG (ref 26.5–33)
MCHC RBC AUTO-ENTMCNC: 34.5 G/DL (ref 31.5–36.5)
MCV RBC AUTO: 92 FL (ref 78–100)
MONOCYTES # BLD AUTO: 0.3 10E9/L (ref 0–1.3)
MONOCYTES NFR BLD AUTO: 10.2 %
NEUTROPHILS # BLD AUTO: 1.3 10E9/L (ref 1.6–8.3)
NEUTROPHILS NFR BLD AUTO: 47.1 %
PLATELET # BLD AUTO: 195 10E9/L (ref 150–450)
RBC # BLD AUTO: 4.5 10E12/L (ref 3.8–5.2)
WBC # BLD AUTO: 2.7 10E9/L (ref 4–11)

## 2020-09-24 PROCEDURE — 80061 LIPID PANEL: CPT | Performed by: NURSE PRACTITIONER

## 2020-09-24 PROCEDURE — 90715 TDAP VACCINE 7 YRS/> IM: CPT | Performed by: NURSE PRACTITIONER

## 2020-09-24 PROCEDURE — 90472 IMMUNIZATION ADMIN EACH ADD: CPT | Performed by: NURSE PRACTITIONER

## 2020-09-24 PROCEDURE — 85025 COMPLETE CBC W/AUTO DIFF WBC: CPT | Performed by: NURSE PRACTITIONER

## 2020-09-24 PROCEDURE — 36415 COLL VENOUS BLD VENIPUNCTURE: CPT | Performed by: NURSE PRACTITIONER

## 2020-09-24 PROCEDURE — 83036 HEMOGLOBIN GLYCOSYLATED A1C: CPT | Performed by: NURSE PRACTITIONER

## 2020-09-24 PROCEDURE — 84443 ASSAY THYROID STIM HORMONE: CPT | Performed by: NURSE PRACTITIONER

## 2020-09-24 PROCEDURE — 99214 OFFICE O/P EST MOD 30 MIN: CPT | Mod: 25 | Performed by: NURSE PRACTITIONER

## 2020-09-24 PROCEDURE — 90686 IIV4 VACC NO PRSV 0.5 ML IM: CPT | Performed by: NURSE PRACTITIONER

## 2020-09-24 PROCEDURE — 90471 IMMUNIZATION ADMIN: CPT | Performed by: NURSE PRACTITIONER

## 2020-09-24 PROCEDURE — 99395 PREV VISIT EST AGE 18-39: CPT | Mod: 25 | Performed by: NURSE PRACTITIONER

## 2020-09-24 ASSESSMENT — MIFFLIN-ST. JEOR: SCORE: 1338.58

## 2020-09-24 NOTE — LETTER
September 29, 2020      Brynn Sprague  [address] [place] [address]        Dear ,    We are writing to inform you of your test results.    Thank you for your recent office visit.     Here are your recent results. Your White blood cell count is low, as it was 10 months ago. Otherwise your labs are considered normal for age.  Follow up as needed.     Resulted Orders   Lipid panel reflex to direct LDL Non-fasting   Result Value Ref Range    Cholesterol 184 <200 mg/dL    Triglycerides 78 <150 mg/dL      Comment:      Non Fasting    HDL Cholesterol 42 (L) >49 mg/dL    LDL Cholesterol Calculated 126 (H) <100 mg/dL      Comment:      Above desirable:  100-129 mg/dl  Borderline High:  130-159 mg/dL  High:             160-189 mg/dL  Very high:       >189 mg/dl      Non HDL Cholesterol 142 (H) <130 mg/dL      Comment:      Above Desirable:  130-159 mg/dl  Borderline high:  160-189 mg/dl  High:             190-219 mg/dl  Very high:       >219 mg/dl     TSH with free T4 reflex   Result Value Ref Range    TSH 2.34 0.40 - 4.00 mU/L   Hemoglobin A1c   Result Value Ref Range    Hemoglobin A1C 4.9 0 - 5.6 %      Comment:      Normal <5.7% Prediabetes 5.7-6.4%  Diabetes 6.5% or higher - adopted from ADA   consensus guidelines.     CBC with platelets and differential   Result Value Ref Range    WBC 2.7 (L) 4.0 - 11.0 10e9/L    RBC Count 4.50 3.8 - 5.2 10e12/L    Hemoglobin 14.3 11.7 - 15.7 g/dL    Hematocrit 41.4 35.0 - 47.0 %    MCV 92 78 - 100 fl    MCH 31.8 26.5 - 33.0 pg    MCHC 34.5 31.5 - 36.5 g/dL    RDW 12.2 10.0 - 15.0 %    Platelet Count 195 150 - 450 10e9/L    % Neutrophils 47.1 %    % Lymphocytes 38.9 %    % Monocytes 10.2 %    % Eosinophils 3.4 %    % Basophils 0.4 %    Absolute Neutrophil 1.3 (L) 1.6 - 8.3 10e9/L    Absolute Lymphocytes 1.0 0.8 - 5.3 10e9/L    Absolute Monocytes 0.3 0.0 - 1.3 10e9/L    Absolute Eosinophils 0.1 0.0 - 0.7 10e9/L    Absolute Basophils 0.0 0.0 - 0.2 10e9/L    Diff Method  Automated Method        If you have any questions or concerns, please call the clinic at the number listed above.       Sincerely,        Ileana Esteves NP/jersey

## 2020-09-25 LAB
CHOLEST SERPL-MCNC: 184 MG/DL
HDLC SERPL-MCNC: 42 MG/DL
LDLC SERPL CALC-MCNC: 126 MG/DL
NONHDLC SERPL-MCNC: 142 MG/DL
TRIGL SERPL-MCNC: 78 MG/DL
TSH SERPL DL<=0.005 MIU/L-ACNC: 2.34 MU/L (ref 0.4–4)

## 2020-09-29 NOTE — RESULT ENCOUNTER NOTE
Please send letter.     Ashkan Swain,    Thank you for your recent office visit.    Here are your recent results.  Your White blood cell count is low, as it was 10 months ago.  Otherwise your labs are considered normal for age.  Follow up as needed.     Feel free to contact me via [employer] or call the clinic at [phone].    Sincerely,    Ileana Esteves, LÁZARO, FNP-BC

## 2020-10-09 ENCOUNTER — ANCILLARY PROCEDURE (OUTPATIENT)
Dept: ULTRASOUND IMAGING | Facility: CLINIC | Age: 40
End: 2020-10-09
Attending: NURSE PRACTITIONER
Payer: COMMERCIAL

## 2020-10-09 DIAGNOSIS — C64.2 MALIGNANT NEOPLASM OF LEFT KIDNEY (H): ICD-10-CM

## 2020-10-09 DIAGNOSIS — R10.9 RIGHT FLANK PAIN: ICD-10-CM

## 2020-10-09 PROCEDURE — 76770 US EXAM ABDO BACK WALL COMP: CPT | Mod: TC

## 2020-10-09 NOTE — LETTER
October 16, 2020      Brynn Sprague  [address] [place] [address]        Dear ,    We are writing to inform you of your test results.    Ultrasound shows normal appearance of kidneys bilateral.     Resulted Orders   US Renal Complete    Narrative    ULTRASOUND  RENAL COMPLETE 10/9/2020 3:03 PM    HISTORY: 39-year-old woman with malignant neoplasm of the left kidney  and right flank pain.    COMPARISON: None. However, patient had CT exam on November 13, 2019.    FINDINGS: The right kidney is 11 x 5.8 x 4.5 cm with AP cortical  thickness of 1.2 cm. The left kidney is 9.6 x 4.1 x 4.4 cm with AP  cortical thickness of 1.2 cm. No hydronephrosis. No obvious visible  mass. Bladder is distended and otherwise unremarkable. Bilateral  ureteral jets identified.      Impression    IMPRESSION: Normal appearance of both kidneys. No hydronephrosis. No  obvious renal mass, though ultrasound may be poor evaluation for renal  mass.    JONN FRANCIS MD       If you have any questions or concerns, please call the clinic at the number listed above.       Sincerely,    LÁZARO Kaiser, PRIMO-BC/oralia

## 2020-10-13 NOTE — RESULT ENCOUNTER NOTE
Please call  Ultrasound shows Normal appearance of kidneys bilateral.     Ileana Esteves, APRN, FNP-BC

## 2020-10-29 ENCOUNTER — PRE VISIT (OUTPATIENT)
Dept: UROLOGY | Facility: CLINIC | Age: 40
End: 2020-10-29

## 2020-10-29 NOTE — TELEPHONE ENCOUNTER
Visit Type : year follow up with labs and imaging before     Records/Orders: labs and imaging are before appointment     Pt Contacted: no    At Rooming: normal

## 2020-11-12 ENCOUNTER — TELEPHONE (OUTPATIENT)
Dept: UROLOGY | Facility: CLINIC | Age: 40
End: 2020-11-12

## 2020-11-12 NOTE — TELEPHONE ENCOUNTER
[facility] Call Center    Phone Message    May a detailed message be left on voicemail: yes     Reason for Call: Other: Pt cancelled CT and xray she had scheduled for Dr. Carrillo and attempted to reschedule but the orders  on . Please extend orders and call pt so she knows when she can contact imaging department again. Thank you     Action Taken: Message routed to:  [facility] ([facility] Uro    Travel Screening: Not Applicable

## 2020-11-25 ENCOUNTER — PRE VISIT (OUTPATIENT)
Dept: UROLOGY | Facility: CLINIC | Age: 40
End: 2020-11-25

## 2020-11-25 NOTE — TELEPHONE ENCOUNTER
Visit Type : 1 year follow up    Records/Orders: CT and XR scheduled for 12/11    Pt Contacted: No    At Rooming: Normal rooming

## 2020-12-11 ENCOUNTER — ANCILLARY PROCEDURE (OUTPATIENT)
Dept: GENERAL RADIOLOGY | Facility: CLINIC | Age: 40
End: 2020-12-11
Attending: UROLOGY
Payer: COMMERCIAL

## 2020-12-11 ENCOUNTER — OFFICE VISIT (OUTPATIENT)
Dept: UROLOGY | Facility: CLINIC | Age: 40
End: 2020-12-11
Payer: COMMERCIAL

## 2020-12-11 ENCOUNTER — ANCILLARY PROCEDURE (OUTPATIENT)
Dept: CT IMAGING | Facility: CLINIC | Age: 40
End: 2020-12-11
Attending: UROLOGY
Payer: COMMERCIAL

## 2020-12-11 VITALS
BODY MASS INDEX: 26.05 KG/M2 | WEIGHT: 147 LBS | HEIGHT: 63 IN | DIASTOLIC BLOOD PRESSURE: 77 MMHG | SYSTOLIC BLOOD PRESSURE: 117 MMHG | HEART RATE: 98 BPM

## 2020-12-11 DIAGNOSIS — C64.2 MALIGNANT NEOPLASM OF KIDNEY EXCLUDING RENAL PELVIS, LEFT (H): Primary | ICD-10-CM

## 2020-12-11 DIAGNOSIS — C64.1 MALIGNANT NEOPLASM OF RIGHT KIDNEY (H): ICD-10-CM

## 2020-12-11 LAB
ANION GAP SERPL CALCULATED.3IONS-SCNC: 5 MMOL/L (ref 3–14)
BUN SERPL-MCNC: 8 MG/DL (ref 7–30)
CALCIUM SERPL-MCNC: 9 MG/DL (ref 8.5–10.1)
CHLORIDE SERPL-SCNC: 108 MMOL/L (ref 94–109)
CO2 SERPL-SCNC: 30 MMOL/L (ref 20–32)
CREAT BLD-MCNC: 0.6 MG/DL (ref 0.52–1.04)
CREAT SERPL-MCNC: 0.63 MG/DL (ref 0.52–1.04)
GFR SERPL CREATININE-BSD FRML MDRD: >90 ML/MIN/{1.73_M2}
GFR SERPL CREATININE-BSD FRML MDRD: >90 ML/MIN/{1.73_M2}
GLUCOSE SERPL-MCNC: 79 MG/DL (ref 70–99)
POTASSIUM SERPL-SCNC: 3.8 MMOL/L (ref 3.4–5.3)
SODIUM SERPL-SCNC: 143 MMOL/L (ref 133–144)

## 2020-12-11 PROCEDURE — 74178 CT ABD&PLV WO CNTR FLWD CNTR: CPT | Mod: GC | Performed by: RADIOLOGY

## 2020-12-11 PROCEDURE — 80048 BASIC METABOLIC PNL TOTAL CA: CPT | Performed by: PATHOLOGY

## 2020-12-11 PROCEDURE — 99213 OFFICE O/P EST LOW 20 MIN: CPT | Performed by: STUDENT IN AN ORGANIZED HEALTH CARE EDUCATION/TRAINING PROGRAM

## 2020-12-11 PROCEDURE — 36415 COLL VENOUS BLD VENIPUNCTURE: CPT | Performed by: PATHOLOGY

## 2020-12-11 PROCEDURE — 71046 X-RAY EXAM CHEST 2 VIEWS: CPT | Mod: GC | Performed by: RADIOLOGY

## 2020-12-11 PROCEDURE — 82565 ASSAY OF CREATININE: CPT | Performed by: PATHOLOGY

## 2020-12-11 RX ORDER — IOPAMIDOL 755 MG/ML
96 INJECTION, SOLUTION INTRAVASCULAR ONCE
Status: COMPLETED | OUTPATIENT
Start: 2020-12-11 | End: 2020-12-11

## 2020-12-11 RX ADMIN — IOPAMIDOL 96 ML: 755 INJECTION, SOLUTION INTRAVASCULAR at 13:41

## 2020-12-11 ASSESSMENT — PAIN SCALES - GENERAL: PAINLEVEL: NO PAIN (0)

## 2020-12-11 ASSESSMENT — MIFFLIN-ST. JEOR: SCORE: 1305.92

## 2020-12-11 NOTE — NURSING NOTE
"Chief Complaint   Patient presents with     RECHECK     1 year follow up       Blood pressure 117/77, pulse 98, height 1.6 m (5' 3\"), weight 66.7 kg (147 lb), not currently breastfeeding. Body mass index is 26.04 kg/m .    Patient Active Problem List   Diagnosis     CARDIOVASCULAR SCREENING; LDL GOAL LESS THAN 160     Cervical polyp     Impingement syndrome of right shoulder     Renal cancer (H)     Anal fissure     IUD contraception       No Known Allergies    Current Outpatient Medications   Medication Sig Dispense Refill     acetaminophen (TYLENOL) 325 MG tablet Take 325-650 mg by mouth every 6 hours as needed for mild pain       levonorgestrel (MIRENA) 20 MCG/24HR IUD 1 each by Intrauterine route once       dextromethorphan (DELSYM) 30 MG/5ML liquid Take 60 mg by mouth 2 times daily       ibuprofen (ADVIL/MOTRIN) 200 MG capsule Take 200 mg by mouth every 4 hours as needed for fever         Social History     Tobacco Use     Smoking status: Never Smoker     Smokeless tobacco: Never Used   Substance Use Topics     Alcohol use: No     Drug use: No       TRISTIAN Dale  12/11/2020  2:20 PM     "

## 2020-12-11 NOTE — PATIENT INSTRUCTIONS
Please follow up in 1 year with labs and imaging before     It was a pleasure meeting with you today.  Thank you for allowing me and my team the privilege of caring for you today.  YOU are the reason we are here, and I truly hope we provided you with the excellent service you deserve.  Please let us know if there is anything else we can do for you so that we can be sure you are leaving completely satisfied with your care experience.

## 2020-12-11 NOTE — LETTER
"12/11/2020       RE: Brynn Sprague  [address]  [place] [address]     Dear Colleague,    Thank you for referring your patient, Brynn Sprague, to the [facility] [place] [facility]. Please see a copy of my visit note below.    CHIEF COMPLAINT   It was my pleasure to see Brynn Sprague who is a 40 year old female for follow-up of Left partial nephrectomy in 2017.      HPI   Brynn Sprague is a very pleasant 40 year old female who presents with a history of left  Renal Mass ( chromophobe RCC).  She is 3 years post partial nephrectomy and has been doing well and does report of occasional discomfort on the right back.    PHYSICAL EXAM  Patient is a 40 year old  female   Vitals: Blood pressure 117/77, pulse 98, height 1.6 m (5' 3\"), weight 66.7 kg (147 lb), not currently breastfeeding.  General Appearance Adult: Body mass index is 26.04 kg/m .  Alert, no acute distress, oriented  HENT: throat/mouth:normal, good dentition  Lungs: no respiratory distress, or pursed lip breathing  Heart: No obvious jugular venous distension present  Abdomen: soft, nontender, no organomegaly or masses; scar in the lower midline  Back: no CVAT  Musculoskeltal: extremities normal, no peripheral edema  Skin: no suspicious lesions or rashes  Neuro: Alert, oriented, speech and mentation normal  Psych: affect and mood normal  Gait: Normal  : deferred      LAB  Basic metabolic panel  Order: [id number]  Status:  Final result   Visible to patient:  No (inaccessible in MyChart) Dx:  Malignant neoplasm of right kidney (H)  (important suggestion)  Newer results are available. Click to view them now.     Ref Range & Units 1d ago    Sodium 133 - 144 mmol/L 143     Potassium 3.4 - 5.3 mmol/L 3.8     Chloride 94 - 109 mmol/L 108     Carbon Dioxide 20 - 32 mmol/L 30     Anion Gap 3 - 14 mmol/L 5     Glucose 70 - 99 mg/dL 79     Urea Nitrogen 7 - 30 mg/dL 8     Creatinine 0.52 - 1.04 " mg/dL 0.63     GFR Estimate >60 mL/min/ >90            CBC with platelets and differential  Order: [id number]  Status:  Final result   Visible to patient:  No (inaccessible in MyChart) Dx:  Right flank pain; Malignant neoplasm ...    Ref Range & Units 2mo ago    WBC 4.0 - 11.0 10e9/L 2.7Low      RBC Count 3.8 - 5.2 10e12/L 4.50     Hemoglobin 11.7 - 15.7 g/dL 14.3     Hematocrit 35.0 - 47.0 % 41.4     MCV 78 - 100 fl 92     MCH 26.5 - 33.0 pg 31.8     MCHC 31.5 - 36.5 g/dL 34.5     RDW 10.0 - 15.0 % 12.2     Platelet Count 150 - 450 10e9/L 195     % Neutrophils % 47.1     % Lymphocytes % 38.9     % Monocytes % 10.2     % Eosinophils % 3.4     % Basophils % 0.4     Absolute Neutrophil 1.6 - 8.3 10e9/L 1.3Low      Absolute Lymphocytes 0.8 - 5.3 10e9/L 1.0     Absolute Monocytes 0.0 - 1.3 10e9/L 0.3     Absolute Eosinophils 0.0 - 0.7 10e9/L 0.1     Absolute Basophils 0.0 - 0.2 10e9/L 0.0                IMAGING  EXAMINATION: CT ABDOMEN WO & W & PELVIS W CONTRAST,  12/11/2020 1:51 PM     TECHNIQUE:  Helical CT images from the lung bases through the  symphysis pubis were obtained with IV contrast. Contrast dose: Isovue  370 96cc     COMPARISON: CT 11/13/2019, 10/18/2018     HISTORY: renal cell cancer; Malignant neoplasm of right kidney (H)     FINDINGS:     ABDOMEN/PELVIS:  LIVER: No suspicious hepatic mass.  GALLBLADDER: Within normal limits.  PANCREAS: Within normal limits.  SPLEEN: Within normal limits.  ADRENAL GLANDS: Within normal limits.  URINARY TRACT: Postsurgical changes of partial left nephrectomy.  Symmetric cortical enhancement bilaterally. No suspicious renal mass.  No nephrolithiasis or hydronephrosis. Urinary bladder is unremarkable.  REPRODUCTIVE ORGANS: Intrauterine device is appropriately positioned  within the endometrial canal. 2.5 cm right ovarian cyst.  BOWEL: Normal caliber of the small and large bowel.  Appendix is  within normal limits. No abnormal wall thickening or  inflammatory  change.  PERITONEUM/FLUID: No free fluid or air in the abdomen or pelvis.     VESSELS: No aneurysmal dilatation of the abdominal aorta. The portal,  splenic, and superior mesenteric veins are patent.The origins of the  celiac and superior mesenteric arteries are patent.     LYMPH NODES: No lymphadenopathy in the abdomen or pelvis.     BONES/SOFT TISSUES: Stable area of sclerosis in the right iliac bone,  likely bone island. Unchanged round lucent lesion in the left iliac  bone measuring 6 mm stable since 11/22/2016. No suspicious lesions.     LUNG BASES: Clear. Heart is not enlarged. No pleural or pericardial  effusion.                                                                      IMPRESSION:   Stable postsurgical changes of partial left nephrectomy. No evidence  of locally recurrent or metastatic disease in the abdomen or pelvis.     ASSESSMENT and PLAN  No evidence of disease recurrence in the abdomen and chest  She should be fine to come for a   Review in 1 year    I spent over 20 minutes with the patient.  Over half this time was spent on discussing the radiology and counselling regarding the follow up plan.    Tree Carrillo MD  Urology  [facility]

## 2020-12-11 NOTE — PROGRESS NOTES
"CHIEF COMPLAINT   It was my pleasure to see Brynn Sprague who is a 40 year old female for follow-up of Left partial nephrectomy in 2017.      HPI   Brynn Sprague is a very pleasant 40 year old female who presents with a history of left  Renal Mass ( chromophobe RCC).  She is 3 years post partial nephrectomy and has been doing well and does report of occasional discomfort on the right back.    PHYSICAL EXAM  Patient is a 40 year old  female   Vitals: Blood pressure 117/77, pulse 98, height 1.6 m (5' 3\"), weight 66.7 kg (147 lb), not currently breastfeeding.  General Appearance Adult: Body mass index is 26.04 kg/m .  Alert, no acute distress, oriented  HENT: throat/mouth:normal, good dentition  Lungs: no respiratory distress, or pursed lip breathing  Heart: No obvious jugular venous distension present  Abdomen: soft, nontender, no organomegaly or masses; scar in the lower midline  Back: no CVAT  Musculoskeltal: extremities normal, no peripheral edema  Skin: no suspicious lesions or rashes  Neuro: Alert, oriented, speech and mentation normal  Psych: affect and mood normal  Gait: Normal  : deferred      LAB  Basic metabolic panel  Order: [id number]  Status:  Final result   Visible to patient:  No (inaccessible in MyChart) Dx:  Malignant neoplasm of right kidney (H)  (important suggestion)  Newer results are available. Click to view them now.     Ref Range & Units 1d ago    Sodium 133 - 144 mmol/L 143     Potassium 3.4 - 5.3 mmol/L 3.8     Chloride 94 - 109 mmol/L 108     Carbon Dioxide 20 - 32 mmol/L 30     Anion Gap 3 - 14 mmol/L 5     Glucose 70 - 99 mg/dL 79     Urea Nitrogen 7 - 30 mg/dL 8     Creatinine 0.52 - 1.04 mg/dL 0.63     GFR Estimate >60 mL/min/ >90            CBC with platelets and differential  Order: [id number]  Status:  Final result   Visible to patient:  No (inaccessible in MyChart) Dx:  Right flank pain; Malignant neoplasm ...    Ref Range & Units 2mo ago    WBC 4.0 - 11.0 10e9/L 2.7Low      RBC " Count 3.8 - 5.2 10e12/L 4.50     Hemoglobin 11.7 - 15.7 g/dL 14.3     Hematocrit 35.0 - 47.0 % 41.4     MCV 78 - 100 fl 92     MCH 26.5 - 33.0 pg 31.8     MCHC 31.5 - 36.5 g/dL 34.5     RDW 10.0 - 15.0 % 12.2     Platelet Count 150 - 450 10e9/L 195     % Neutrophils % 47.1     % Lymphocytes % 38.9     % Monocytes % 10.2     % Eosinophils % 3.4     % Basophils % 0.4     Absolute Neutrophil 1.6 - 8.3 10e9/L 1.3Low      Absolute Lymphocytes 0.8 - 5.3 10e9/L 1.0     Absolute Monocytes 0.0 - 1.3 10e9/L 0.3     Absolute Eosinophils 0.0 - 0.7 10e9/L 0.1     Absolute Basophils 0.0 - 0.2 10e9/L 0.0                IMAGING  EXAMINATION: CT ABDOMEN WO & W & PELVIS W CONTRAST,  12/11/2020 1:51 PM     TECHNIQUE:  Helical CT images from the lung bases through the  symphysis pubis were obtained with IV contrast. Contrast dose: Isovue  370 96cc     COMPARISON: CT 11/13/2019, 10/18/2018     HISTORY: renal cell cancer; Malignant neoplasm of right kidney (H)     FINDINGS:     ABDOMEN/PELVIS:  LIVER: No suspicious hepatic mass.  GALLBLADDER: Within normal limits.  PANCREAS: Within normal limits.  SPLEEN: Within normal limits.  ADRENAL GLANDS: Within normal limits.  URINARY TRACT: Postsurgical changes of partial left nephrectomy.  Symmetric cortical enhancement bilaterally. No suspicious renal mass.  No nephrolithiasis or hydronephrosis. Urinary bladder is unremarkable.  REPRODUCTIVE ORGANS: Intrauterine device is appropriately positioned  within the endometrial canal. 2.5 cm right ovarian cyst.  BOWEL: Normal caliber of the small and large bowel.  Appendix is  within normal limits. No abnormal wall thickening or inflammatory  change.  PERITONEUM/FLUID: No free fluid or air in the abdomen or pelvis.     VESSELS: No aneurysmal dilatation of the abdominal aorta. The portal,  splenic, and superior mesenteric veins are patent.The origins of the  celiac and superior mesenteric arteries are patent.     LYMPH NODES: No lymphadenopathy in the  abdomen or pelvis.     BONES/SOFT TISSUES: Stable area of sclerosis in the right iliac bone,  likely bone island. Unchanged round lucent lesion in the left iliac  bone measuring 6 mm stable since 11/22/2016. No suspicious lesions.     LUNG BASES: Clear. Heart is not enlarged. No pleural or pericardial  effusion.                                                                      IMPRESSION:   Stable postsurgical changes of partial left nephrectomy. No evidence  of locally recurrent or metastatic disease in the abdomen or pelvis.     ASSESSMENT and PLAN  No evidence of disease recurrence in the abdomen and chest  She should be fine to come for a   Review in 1 year    I spent over 20 minutes with the patient.  Over half this time was spent on discussing the radiology and counselling regarding the follow up plan.    Tree Carrillo MD  Urology  [facility]

## 2021-04-28 ENCOUNTER — OFFICE VISIT (OUTPATIENT)
Dept: FAMILY MEDICINE | Facility: CLINIC | Age: 41
End: 2021-04-28
Payer: COMMERCIAL

## 2021-04-28 VITALS
OXYGEN SATURATION: 100 % | TEMPERATURE: 98 F | DIASTOLIC BLOOD PRESSURE: 70 MMHG | BODY MASS INDEX: 26.4 KG/M2 | HEIGHT: 63 IN | RESPIRATION RATE: 20 BRPM | HEART RATE: 82 BPM | WEIGHT: 149 LBS | SYSTOLIC BLOOD PRESSURE: 110 MMHG

## 2021-04-28 DIAGNOSIS — C64.2 MALIGNANT NEOPLASM OF LEFT KIDNEY (H): ICD-10-CM

## 2021-04-28 DIAGNOSIS — Z97.5 IUD (INTRAUTERINE DEVICE) IN PLACE: ICD-10-CM

## 2021-04-28 DIAGNOSIS — Z01.818 TUBAL LIGATION EVALUATION: ICD-10-CM

## 2021-04-28 DIAGNOSIS — R39.9 UTI SYMPTOMS: ICD-10-CM

## 2021-04-28 DIAGNOSIS — N30.00 ACUTE CYSTITIS WITHOUT HEMATURIA: Primary | ICD-10-CM

## 2021-04-28 PROBLEM — C64.9 RENAL CANCER (H): Status: RESOLVED | Noted: 2017-01-16 | Resolved: 2021-04-28

## 2021-04-28 LAB
ALBUMIN UR-MCNC: NEGATIVE MG/DL
APPEARANCE UR: CLEAR
BACTERIA #/AREA URNS HPF: ABNORMAL /HPF
BILIRUB UR QL STRIP: NEGATIVE
COLOR UR AUTO: YELLOW
GLUCOSE UR STRIP-MCNC: NEGATIVE MG/DL
HGB UR QL STRIP: ABNORMAL
KETONES UR STRIP-MCNC: NEGATIVE MG/DL
LEUKOCYTE ESTERASE UR QL STRIP: ABNORMAL
NITRATE UR QL: NEGATIVE
NON-SQ EPI CELLS #/AREA URNS LPF: ABNORMAL /LPF
PH UR STRIP: 5 PH (ref 5–7)
RBC #/AREA URNS AUTO: ABNORMAL /HPF
SOURCE: ABNORMAL
SP GR UR STRIP: 1.02 (ref 1–1.03)
UROBILINOGEN UR STRIP-ACNC: 0.2 EU/DL (ref 0.2–1)
WBC #/AREA URNS AUTO: ABNORMAL /HPF

## 2021-04-28 PROCEDURE — 87086 URINE CULTURE/COLONY COUNT: CPT | Performed by: FAMILY MEDICINE

## 2021-04-28 PROCEDURE — 81001 URINALYSIS AUTO W/SCOPE: CPT | Performed by: FAMILY MEDICINE

## 2021-04-28 PROCEDURE — 87088 URINE BACTERIA CULTURE: CPT | Performed by: FAMILY MEDICINE

## 2021-04-28 PROCEDURE — 99214 OFFICE O/P EST MOD 30 MIN: CPT | Performed by: FAMILY MEDICINE

## 2021-04-28 PROCEDURE — 87186 SC STD MICRODIL/AGAR DIL: CPT | Performed by: FAMILY MEDICINE

## 2021-04-28 RX ORDER — NITROFURANTOIN 25; 75 MG/1; MG/1
100 CAPSULE ORAL 2 TIMES DAILY
Qty: 14 CAPSULE | Refills: 0 | Status: SHIPPED | OUTPATIENT
Start: 2021-04-28 | End: 2021-05-05

## 2021-04-28 ASSESSMENT — MIFFLIN-ST. JEOR: SCORE: 1314.99

## 2021-04-28 NOTE — PATIENT INSTRUCTIONS
Due for IUD removal, schedule visit at earliest convenience.       Patient Education     Bladder Infection, Female (Adult)     Urine normally doesn't have any germs (bacteria) in it. But bacteria can get into the urinary tract from the skin around the rectum. Or they can travel in the blood from other parts of the body. Once they are in your urinary tract, they can cause infection in these areas:    The urethra (urethritis)    The bladder (cystitis)    The kidneys (pyelonephritis)  The most common place for an infection is in the bladder. This is called a bladder infection. This is one of the most common infections in women. Most bladder infections are easily treated. They are not serious unless the infection spreads to the kidney.  The terms bladder infection, UTI, and cystitis are often used to describe the same thing. But they are not always the same. Cystitis is an inflammation of the bladder. The most common cause of cystitis is an infection.  Symptoms  The infection causes inflammation in the urethra and bladder. This causes many of the symptoms. The most common symptoms of a bladder infection are:    Pain or burning when urinating    Having to urinate more often than normal    Urgent need to urinate    Only a small amount of urine comes out    Blood in urine    Belly (abdominal) discomfort. This is often in the lower belly above the pubic bone.    Cloudy urine    Strong- or bad-smelling urine    Unable to urinate (urinary retention)    Unable to hold urine in (urinary incontinence)    Fever    Loss of appetite    Confusion (in older adults)  Causes  Bladder infections are not contagious. You can't get one from someone else, from a toilet seat, or from sharing a bath.  The most common cause of bladder infections is bacteria from the bowels. The bacteria get onto the skin around the opening of the urethra. From there, they can get into the urine. Then they travel up to the bladder, causing inflammation and  infection. This often happens because of:    Wiping incorrectly after urinating. Always wipe from front to back.    Bowel incontinence    Pregnancy    Procedures such as having a catheter put in    Older age    Not emptying your bladder. This can give bacteria a chance to grow in your urine.    Fluid loss (dehydration)    Constipation    Having sex    Using a diaphragm for birth control   Treatment  Bladder infections are diagnosed by a urine test and urine culture. They are treated with antibiotics. They often clear up quickly without problems. Treatment helps prevent a more serious kidney infection.  Medicines  Medicines can help in the treatment of a bladder infection:    Take antibiotics until they are used up, even if you feel better. It's important to finish them to make sure the infection has cleared.    You can use acetaminophen or ibuprofen for pain, fever, or discomfort, unless another medicine was prescribed. If you have long-term (chronic) liver or kidney disease, talk with your healthcare provider before using these medicines. Also talk with your provider if you've ever had a stomach ulcer or GI (gastrointestinal) bleeding, or are taking blood-thinner medicines.    If you are given phenazopydridine to reduce burning with urination, it will make your urine a bright orange color. This can stain clothing.  Care and prevention  These self-care steps can help prevent future infections:    Drink plenty of fluids. This helps to prevent dehydration and flush out your bladder. Do this unless you must restrict fluids for other health reasons, or your healthcare provider told you not to.    Clean yourself correctly after going to the bathroom. Wipe from front to back after using the toilet. This helps prevent the spread of bacteria.    Urinate more often. Don't try to hold urine in for a long time.    Wear loose-fitting clothes and cotton underwear. Don't wear tight-fitting pants.    Improve your diet and prevent  constipation. Eat more fresh fruits and vegetables, and fiber. Eat less junk foods and fatty foods.    Don't have sex until your symptoms are gone.    Don't have caffeine, alcohol, and spicy foods. These can irritate your bladder.    Urinate right after you have sex to flush out your bladder.    If you use birth control pills and have frequent bladder infections, discuss it with your healthcare provider.  Follow-up care  Call your healthcare provider if all symptoms are not gone after 3 days of treatment. This is especially important if you have repeat infections.  If a culture was done, you will be told if your treatment needs to be changed. If directed, you can call to find out the results.  If X-rays were done, you will be told if the results will affect your treatment.  Call 911  Call 911 if any of the following occur:    Trouble breathing    Hard to wake up or confusion    Fainting (loss of consciousness)    Fast heart rate  When to get medical advice  Call your healthcare provider right away if any of these occur:    Fever of 100.4 F (38.0 C) or higher, or as directed by your healthcare provider    Symptoms are not better after 3 days of treatment    Back or belly pain that gets worse    Repeated vomiting, or unable to keep medicine down    Weakness or dizziness    Vaginal discharge    Pain, redness, or swelling in the outer vaginal area (labia)  [employer] last reviewed this educational content on 11/1/2019 2000-2021 [employer]. All rights reserved. This information is not intended as a substitute for professional medical care. Always follow your healthcare professional's instructions.

## 2021-04-28 NOTE — PROGRESS NOTES
Assessment & Plan     Acute cystitis without hematuria  - Urine Culture Aerobic Bacterial  - nitroFURantoin macrocrystal-monohydrate (MACROBID) 100 MG capsule  Dispense: 14 capsule; Refill: 0    UTI symptoms  - UA reflex to Microscopic and Culture  - Urine Microscopic    Malignant neoplasm of left kidney (H)  S/p Left partial nephrectomy 1/16/2017  No evidence of disease recurrence to date.     IUD (intrauterine device) in place  Placed in 10/2015. Due for removal.   Patient tos chedule at earliest convenience to have this removed. Patient contemplating a Permanent Sterilization.    Tubal ligation evaluation  - OB/GYN REFERRAL        Return in about 1 week (around 5/5/2021), or if symptoms worsen or fail to improve, for a Physical Exam at your earliest convenience..    Hedy Garcia MD  [facility] SUSI Swain is a 40 year old who presents for the following health issues     HPI     Genitourinary - Female  Onset/Duration: 1 week   Description:   Painful urination (Dysuria): YES           Frequency: YES  Blood in urine (Hematuria): YES  Delay in urine (Hesitency): no  Intensity: moderate  Progression of Symptoms:  worsening and intermittent  Accompanying Signs & Symptoms:  Fever/chills: no  Flank pain: no  Nausea and vomiting: no  Vaginal symptoms: none  Abdominal/Pelvic Pain: YES  History:   History of frequent UTI s: no  History of kidney stones: no  Sexually Active: YES  Possibility of pregnancy: No      ADDITIONAL CONCERNS  Patient has a known history of Malignant neoplasm of left kidney   S/p Left partial nephrectomy 1/16/2017  No evidence of disease recurrence to date.   Last renal function tests were normal.   Component      Latest Ref Rng & Units 12/11/2020   Creatinine      0.52 - 1.04 mg/dL 0.6   GFR Estimate      >60 mL/min/1.73:m2 >90   GFR Estimate If Black      >60 mL/min/1.73:m2 >90       States that she has an IUD in place, does not recall when it was placed.  "Thinks it's overdue for removal. Also wishes to do a Tubal ligation once it's removed.     HEALTH CARE MAINTENANCE: Due for a Physical Exam.     Review of Systems   Constitutional, HEENT, cardiovascular, pulmonary, gi and gu systems are negative, except as otherwise noted.      Objective    /70   Pulse 82   Temp 98  F (36.7  C) (Tympanic)   Resp 20   Ht 1.6 m (5' 3\")   Wt 67.6 kg (149 lb)   LMP  (LMP Unknown)   SpO2 100%   Breastfeeding No   BMI 26.39 kg/m    Body mass index is 26.39 kg/m .  Physical Exam   GENERAL: healthy, alert and no distress  RESP: lungs clear to auscultation - no rales, rhonchi or wheezes  CV: regular rate and rhythm, normal S1 S2, no S3 or S4, no murmur, click or rub, no peripheral edema and peripheral pulses strong  ABDOMEN: soft, nontender, no hepatosplenomegaly, no masses and bowel sounds normal  MS: no gross musculoskeletal defects noted, no edema    DATA  Reviewed and discussed with patient prior to discharge.  Results for orders placed or performed in visit on 04/28/21   UA reflex to Microscopic and Culture     Status: Abnormal    Specimen: Midstream Urine   Result Value Ref Range    Color Urine Yellow     Appearance Urine Clear     Glucose Urine Negative NEG^Negative mg/dL    Bilirubin Urine Negative NEG^Negative    Ketones Urine Negative NEG^Negative mg/dL    Specific Gravity Urine 1.020 1.003 - 1.035    Blood Urine Small (A) NEG^Negative    pH Urine 5.0 5.0 - 7.0 pH    Protein Albumin Urine Negative NEG^Negative mg/dL    Urobilinogen Urine 0.2 0.2 - 1.0 EU/dL    Nitrite Urine Negative NEG^Negative    Leukocyte Esterase Urine Small (A) NEG^Negative    Source Midstream Urine    Urine Microscopic     Status: Abnormal   Result Value Ref Range    WBC Urine 5-10 (A) OTO5^0 - 5 /HPF    RBC Urine O - 2 OTO2^O - 2 /HPF    Squamous Epithelial /LPF Urine Few FEW^Few /LPF    Bacteria Urine Few (A) NEG^Negative /HPF         "

## 2021-04-29 LAB
BACTERIA SPEC CULT: ABNORMAL
Lab: ABNORMAL
SPECIMEN SOURCE: ABNORMAL

## 2021-08-16 ENCOUNTER — TELEPHONE (OUTPATIENT)
Dept: FAMILY MEDICINE | Facility: CLINIC | Age: 41
End: 2021-08-16

## 2021-08-16 DIAGNOSIS — K64.4 EXTERNAL HEMORRHOIDS: ICD-10-CM

## 2021-08-16 RX ORDER — HYDROCORTISONE ACETATE 25 MG
25 SUPPOSITORY, RECTAL RECTAL 2 TIMES DAILY
Qty: 24 SUPPOSITORY | Refills: 0 | Status: SHIPPED | OUTPATIENT
Start: 2021-08-16 | End: 2023-07-23

## 2021-08-16 NOTE — TELEPHONE ENCOUNTER
Notified patient that medication was sent to the pharmacy.    Patient stated understanding and agreeable with the plan of care.     Blanka TRUONG BSN  Triage Nurse  [facility]

## 2021-08-16 NOTE — TELEPHONE ENCOUNTER
Reason for Call:  Medication or medication refill:    Do you use a [facility]?  Name of the pharmacy and phone number for the current request:  [facility] [place]  [phone]    Name of the medication requested: ANUCORT-HC 25 MG suppository      Other request: Patient has not had this medication for a couple years. Would like again as she is having constipation. Please advise     Can we leave a detailed message on this number? YES    Phone number patient can be reached at: Cell number on file:    Telephone Information:   Mobile [phone]       Best Time: Anytime    Call taken on 8/16/2021 at 1:03 PM by Shaan Shipley

## 2021-08-16 NOTE — TELEPHONE ENCOUNTER
See Note Below.    Routing refill request to provider for review/approval because:  Drug not active on patient's medication list    Last Written Prescription Date:  9/20/16  Last Fill Quantity: 24 suppositories  refills: 0     Last office visit: 4/28/2021 with prescribing provider:  Dr. Garcia      Future Office Visit:  None    Tiffany Lazar RN BSN  [facility]

## 2021-08-16 NOTE — TELEPHONE ENCOUNTER
Patient called again because she had another painful bowel movement this afternoon. She reports no blood when wiping, however she has painful external hemorrhoids. She states that she is trying to drink more water. She said that her children are sick right now and she has not been drinking as much water lately.     She  soaked in a warm bath, however it is not as helpful as the prescription suppositories.     In the past,  the suppositories were very helpful. Patient is very uncomfortable and asking when the prescription can be sent.     Please review refill request.    Tiffany Lazar RN BSN  [facility]

## 2021-12-22 ENCOUNTER — PRE VISIT (OUTPATIENT)
Dept: UROLOGY | Facility: CLINIC | Age: 41
End: 2021-12-22
Payer: COMMERCIAL

## 2021-12-23 NOTE — CONFIDENTIAL NOTE
Reason for visit: annual follow-up, Dr. Carrillo pt     Relevant information: malignant neoplasm of left kidney, left partial nephrectomy in 2017    Records/imaging/labs/orders: records in epic, labs and imaging scheduled    Pt called: n/a    At Rooming: standard

## 2021-12-29 ENCOUNTER — OFFICE VISIT (OUTPATIENT)
Dept: UROLOGY | Facility: CLINIC | Age: 41
End: 2021-12-29
Payer: COMMERCIAL

## 2021-12-29 ENCOUNTER — ANCILLARY PROCEDURE (OUTPATIENT)
Dept: CT IMAGING | Facility: CLINIC | Age: 41
End: 2021-12-29
Attending: STUDENT IN AN ORGANIZED HEALTH CARE EDUCATION/TRAINING PROGRAM
Payer: COMMERCIAL

## 2021-12-29 ENCOUNTER — TELEPHONE (OUTPATIENT)
Dept: UROLOGY | Facility: CLINIC | Age: 41
End: 2021-12-29

## 2021-12-29 ENCOUNTER — EXTERNAL ORDER RESULTS (OUTPATIENT)
Dept: UROLOGY | Facility: CLINIC | Age: 41
End: 2021-12-29

## 2021-12-29 ENCOUNTER — LAB (OUTPATIENT)
Dept: LAB | Facility: CLINIC | Age: 41
End: 2021-12-29
Attending: STUDENT IN AN ORGANIZED HEALTH CARE EDUCATION/TRAINING PROGRAM
Payer: COMMERCIAL

## 2021-12-29 VITALS
DIASTOLIC BLOOD PRESSURE: 77 MMHG | HEART RATE: 80 BPM | HEIGHT: 63 IN | WEIGHT: 140 LBS | SYSTOLIC BLOOD PRESSURE: 111 MMHG | BODY MASS INDEX: 24.8 KG/M2

## 2021-12-29 DIAGNOSIS — C64.2 MALIGNANT NEOPLASM OF KIDNEY EXCLUDING RENAL PELVIS, LEFT (H): ICD-10-CM

## 2021-12-29 DIAGNOSIS — N28.89 LEFT RENAL MASS: Primary | ICD-10-CM

## 2021-12-29 DIAGNOSIS — C64.1 MALIGNANT NEOPLASM OF RIGHT KIDNEY (H): Primary | ICD-10-CM

## 2021-12-29 DIAGNOSIS — N28.89 RIGHT RENAL MASS: ICD-10-CM

## 2021-12-29 LAB
ANION GAP SERPL CALCULATED.3IONS-SCNC: 2 MMOL/L (ref 3–14)
BUN SERPL-MCNC: 7 MG/DL (ref 7–30)
CALCIUM SERPL-MCNC: 8.8 MG/DL (ref 8.5–10.1)
CHLORIDE BLD-SCNC: 110 MMOL/L (ref 94–109)
CO2 SERPL-SCNC: 29 MMOL/L (ref 20–32)
CREAT BLD-MCNC: 0.6 MG/DL (ref 0.5–1)
CREAT SERPL-MCNC: 0.55 MG/DL (ref 0.52–1.04)
ERYTHROCYTE [DISTWIDTH] IN BLOOD BY AUTOMATED COUNT: 12.2 % (ref 10–15)
GFR SERPL CREATININE-BSD FRML MDRD: >60 ML/MIN/1.73M2
GFR SERPL CREATININE-BSD FRML MDRD: >90 ML/MIN/1.73M2
GLUCOSE BLD-MCNC: 88 MG/DL (ref 70–99)
HCT VFR BLD AUTO: 43.1 % (ref 35–47)
HGB BLD-MCNC: 14.7 G/DL (ref 11.7–15.7)
MCH RBC QN AUTO: 31.3 PG (ref 26.5–33)
MCHC RBC AUTO-ENTMCNC: 34.1 G/DL (ref 31.5–36.5)
MCV RBC AUTO: 92 FL (ref 78–100)
PLATELET # BLD AUTO: 198 10E3/UL (ref 150–450)
POTASSIUM BLD-SCNC: 4 MMOL/L (ref 3.4–5.3)
RBC # BLD AUTO: 4.69 10E6/UL (ref 3.8–5.2)
SODIUM SERPL-SCNC: 141 MMOL/L (ref 133–144)
WBC # BLD AUTO: 2.3 10E3/UL (ref 4–11)

## 2021-12-29 PROCEDURE — 80048 BASIC METABOLIC PNL TOTAL CA: CPT | Performed by: PATHOLOGY

## 2021-12-29 PROCEDURE — 71260 CT THORAX DX C+: CPT | Mod: GC | Performed by: RADIOLOGY

## 2021-12-29 PROCEDURE — 82565 ASSAY OF CREATININE: CPT | Mod: 91 | Performed by: PATHOLOGY

## 2021-12-29 PROCEDURE — 36415 COLL VENOUS BLD VENIPUNCTURE: CPT | Performed by: PATHOLOGY

## 2021-12-29 PROCEDURE — 74178 CT ABD&PLV WO CNTR FLWD CNTR: CPT | Mod: GC | Performed by: RADIOLOGY

## 2021-12-29 PROCEDURE — 99214 OFFICE O/P EST MOD 30 MIN: CPT | Performed by: UROLOGY

## 2021-12-29 PROCEDURE — 85027 COMPLETE CBC AUTOMATED: CPT | Performed by: PATHOLOGY

## 2021-12-29 RX ORDER — IOPAMIDOL 755 MG/ML
92 INJECTION, SOLUTION INTRAVASCULAR ONCE
Status: COMPLETED | OUTPATIENT
Start: 2021-12-29 | End: 2021-12-29

## 2021-12-29 RX ADMIN — IOPAMIDOL 92 ML: 755 INJECTION, SOLUTION INTRAVASCULAR at 13:27

## 2021-12-29 ASSESSMENT — PAIN SCALES - GENERAL: PAINLEVEL: NO PAIN (0)

## 2021-12-29 ASSESSMENT — MIFFLIN-ST. JEOR: SCORE: 1269.17

## 2021-12-29 NOTE — NURSING NOTE
"Chief Complaint   Patient presents with     RECHECK     Kidney cancer follow up       Blood pressure 111/77, pulse 80, height 1.6 m (5' 3\"), weight 63.5 kg (140 lb), not currently breastfeeding. Body mass index is 24.8 kg/m .    Patient Active Problem List   Diagnosis     CARDIOVASCULAR SCREENING; LDL GOAL LESS THAN 160     Cervical polyp     Impingement syndrome of right shoulder     Anal fissure     IUD contraception       No Known Allergies    Current Outpatient Medications   Medication Sig Dispense Refill     ibuprofen (ADVIL/MOTRIN) 200 MG capsule Take 200 mg by mouth every 4 hours as needed for fever       levonorgestrel (MIRENA) 20 MCG/24HR IUD 1 each by Intrauterine route once       acetaminophen (TYLENOL) 325 MG tablet Take 325-650 mg by mouth every 6 hours as needed for mild pain (Patient not taking: Reported on 12/29/2021)       ANUCORT-HC 25 MG suppository Place 1 suppository (25 mg) rectally 2 times daily (Patient not taking: Reported on 12/29/2021) 24 suppository 0       Social History     Tobacco Use     Smoking status: Never Smoker     Smokeless tobacco: Never Used   Substance Use Topics     Alcohol use: No     Drug use: No       Rosemary Cedillo CMA  12/29/2021  1:54 PM     "

## 2021-12-29 NOTE — TELEPHONE ENCOUNTER
Lab called and needed to know what orders needed to be fulfilled. Pt has CBC and BMP completed a year ago on 12/11/2020 and this office visit stated a review in 1 year.  will extend CBC and BMP to perform today.

## 2021-12-29 NOTE — PATIENT INSTRUCTIONS
Please schedule a follow-up appointment with Dr. Scott in 1 year with CBC, CMP, and CT chest/abdomen/pelvis prior to you appointnent.    It was a pleasure meeting with you today.  Thank you for allowing me and my team the privilege of caring for you today.  YOU are the reason we are here, and I truly hope we provided you with the excellent service you deserve.  Please let us know if there is anything else we can do for you so that we can be sure you are leaving completely satisfied with your care experience.

## 2021-12-29 NOTE — CONFIDENTIAL NOTE
"Urology Clinic     HPI  Brynn Sprague is a 41 year old female with history of left renal mass status robotic left partial nephrectomy in 2017, here for follow-up      The patient denies any hematuria, recurrent UTI or flank pain   No changes to health, hospitalizations or new diagnoses in the interim    PHYSICAL EXAM  /77   Pulse 80   Ht 1.6 m (5' 3\")   Wt 63.5 kg (140 lb)   BMI 24.80 kg/m     Constitutional: AO, pleasant, NAD  Resp: Non-labored breathing on room air  Abd: Soft, NT, ND  : Deferred     Labs  Lab Results   Component Value Date    WBC 2.3 12/29/2021    WBC 2.7 09/24/2020     Lab Results   Component Value Date    RBC 4.69 12/29/2021    RBC 4.50 09/24/2020     Lab Results   Component Value Date    HGB 14.7 12/29/2021    HGB 14.3 09/24/2020     Lab Results   Component Value Date    HCT 43.1 12/29/2021    HCT 41.4 09/24/2020     No components found for: MCT  Lab Results   Component Value Date    MCV 92 12/29/2021    MCV 92 09/24/2020     Lab Results   Component Value Date    MCH 31.3 12/29/2021    MCH 31.8 09/24/2020     Lab Results   Component Value Date    MCHC 34.1 12/29/2021    MCHC 34.5 09/24/2020     Lab Results   Component Value Date    RDW 12.2 12/29/2021    RDW 12.2 09/24/2020     Lab Results   Component Value Date     12/29/2021     09/24/2020        Last Comprehensive Metabolic Panel:  Sodium   Date Value Ref Range Status   12/29/2021 141 133 - 144 mmol/L Final   12/11/2020 143 133 - 144 mmol/L Final     Potassium   Date Value Ref Range Status   12/29/2021 4.0 3.4 - 5.3 mmol/L Final   12/11/2020 3.8 3.4 - 5.3 mmol/L Final     Chloride   Date Value Ref Range Status   12/29/2021 110 (H) 94 - 109 mmol/L Final   12/11/2020 108 94 - 109 mmol/L Final     Carbon Dioxide   Date Value Ref Range Status   12/11/2020 30 20 - 32 mmol/L Final     Carbon Dioxide (CO2)   Date Value Ref Range Status   12/29/2021 29 20 - 32 mmol/L Final     Anion Gap   Date Value Ref Range Status "   12/29/2021 2 (L) 3 - 14 mmol/L Final   12/11/2020 5 3 - 14 mmol/L Final     Glucose   Date Value Ref Range Status   12/29/2021 88 70 - 99 mg/dL Final   12/11/2020 79 70 - 99 mg/dL Final     Urea Nitrogen   Date Value Ref Range Status   12/29/2021 7 7 - 30 mg/dL Final   12/11/2020 8 7 - 30 mg/dL Final     Creatinine   Date Value Ref Range Status   12/29/2021 0.55 0.52 - 1.04 mg/dL Final   12/11/2020 0.63 0.52 - 1.04 mg/dL Final     Creatinine POCT   Date Value Ref Range Status   12/29/2021 0.6 0.5 - 1.0 mg/dL Final     GFR Estimate   Date Value Ref Range Status   12/29/2021 >90 >60 mL/min/1.73m2 Final     Comment:     Effective December 21, 2021 eGFRcr in adults is calculated using the 2021 CKD-EPI creatinine equation which includes age and gender (Mary et al., NE, DOI: 10.1056/TXZBqk1742548)   12/11/2020 >90 >60 mL/min/[1.73_m2] Final     GFR, ESTIMATED POCT   Date Value Ref Range Status   12/29/2021 >60 >60 mL/min/1.73m2 Final     Calcium   Date Value Ref Range Status   12/29/2021 8.8 8.5 - 10.1 mg/dL Final   12/11/2020 9.0 8.5 - 10.1 mg/dL Final     Bilirubin Total   Date Value Ref Range Status   12/16/2016 0.7 0.2 - 1.3 mg/dL Final     Alkaline Phosphatase   Date Value Ref Range Status   12/16/2016 40 40 - 150 U/L Final     ALT   Date Value Ref Range Status   12/16/2016 20 0 - 50 U/L Final     AST   Date Value Ref Range Status   12/16/2016 14 0 - 45 U/L Final         Surgical pathology     Patient Name: REBECA GÓMEZ   MR#: [id number]   Specimen #:    Collected: 1/16/2017   Received: 1/16/2017   Reported: 1/20/2017 14:31   Ordering Phy(s): UDAY CORNELL     For improved result formatting, select 'View Enhanced Report Format'   under Linked Documents section.     SPECIMEN(S):   Kidney, left     FINAL DIAGNOSIS:   Kidney, tumor, partial nephrectomy:   - Chromophobe renal cell carcinoma   - Tumor size: 8.7 cm   - Tumor extent: Limited to the kidney   - Margins: Negative for carcinoma, 0.1 mm  to nearest resection margin   - Sarcomatoid features and necrosis: Not identified   - Lymph-vascular invasion: Not identified   - Pathologic stage: pT2a   - See comment for complete tumor synopsis     Report Name: Kidney - Nephrectomy, Partial or Radical   Status: Submitted     Part(s) Involved:   A: Kidney, left     Synoptic Report:     SPECIMEN     Procedure:         - Partial nephrectomy     Specimen Laterality:         - Left     Tumor Site:         - Upper pole     Tumor Focality:         - Unifocal     Macroscopic Extent of Tumor:         - Tumor limited to kidney     TUMOR     Histologic Type:         - Chromophobe renal cell carcinoma     Sarcomatoid Features:         - Not identified     Histologic Grade (International Society of Urological Pathology   Grading     System):         - Not applicable     EXTENT     Tumor Size: 8.7 x 7 x 5 cm     Microscopic Tumor Extension:         - Tumor limited to kidney     MARGINS     Margins:         - Margins uninvolved by invasive carcinoma     ACCESSORY FINDINGS     Tumor Necrosis:         - Not identified     Lymph-Vascular Invasion:         - Not identified     LYMPH NODES     Lymph Nodes: No nodes submitted or found     STAGE (PTNM)     Primary Tumor (pT):         - pT2a: Tumor more than 7 cm but less than or equal to 10 cm in   greatest         dimension, limited to the kidney     Regional Lymph Nodes (pN):         - pNX: Regional lymph nodes cannot be assessed     Distant Metastasis (pM):         - Not applicable     ADDITIONAL NON-TUMOR     Pathologic Findings in Nonneoplastic Kidney:         - Insufficient tissue (partial nephrectomy specimen with <5 mm of   adjacent       Imaging   Final read is pending but on my read, no evidence of recurrence or distant mets       ASSESSMENT AND PLAN  41 year old female with 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 Doing well. RIGOBERTO       Plan   RTC in 1 year with CT CAP, CBC, CMP   If  RIGOBERTO at that time, imaging and labs as clinically indicated     30 total minutes spent on the date of the encounter including direct interaction with the patient, performing chart review, documentation and further activities as noted above    Abel Scott MD   Department of Urology   [facility]

## 2021-12-29 NOTE — LETTER
12/29/2021     RE: Brynn Sprague  [address]  [place] [address]     Dear Colleague,    Thank you for referring your patient, Brynn Sprague, to the [facility]. Please see a copy of my visit note below.    No notes on file    Again, thank you for allowing me to participate in the care of your patient.      Sincerely,    Abel Scott MD

## 2022-01-07 ENCOUNTER — VIRTUAL VISIT (OUTPATIENT)
Dept: INTERNAL MEDICINE | Facility: CLINIC | Age: 42
End: 2022-01-07
Payer: COMMERCIAL

## 2022-01-07 ENCOUNTER — NURSE TRIAGE (OUTPATIENT)
Dept: NURSING | Facility: CLINIC | Age: 42
End: 2022-01-07

## 2022-01-07 DIAGNOSIS — R05.9 COUGH: Primary | ICD-10-CM

## 2022-01-07 DIAGNOSIS — Z20.822 EXPOSURE TO 2019 NOVEL CORONAVIRUS: ICD-10-CM

## 2022-01-07 PROCEDURE — 99213 OFFICE O/P EST LOW 20 MIN: CPT | Mod: TEL | Performed by: PHYSICIAN ASSISTANT

## 2022-01-07 NOTE — TELEPHONE ENCOUNTER
Brynn reports COVID exposure in the past 2 weeks.   Symptoms started on Tuesday: headache, cough, on and off chest and back pain, on and off SOB.    No headache now. No SOB and no chest pain at the time of call.    Left mid back pain relieved by massage. Ibuprofen helps with back pain.   Constant coughing.   Poor appetite.    Pt has COVID testing scheduled today at 4:45 PM (outside lab). Needs test result for work purposes. Advised to keep her appointment as [facility] appointment is booked 5 days out per last email from  communications.    COVID 19 Nurse Triage Plan/Patient Instructions    Please be aware that novel coronavirus (COVID-19) may be circulating in the community. If you develop symptoms such as fever, cough, or SOB or if you have concerns about the presence of another infection including coronavirus (COVID-19), please contact your health care provider or visit [email or web address].     Disposition/Instructions    Virtual Visit with provider recommended. Reference Visit Selection Guide. (today)    Thank you for taking steps to prevent the spread of this virus.  o Limit your contact with others.  o Wear a simple mask to cover your cough.  o Wash your hands well and often.    Resources    [facility]: About COVID-19: [email or web address]    CDC: What to Do If You're Sick: www.cdc.gov/coronavirus/2019-ncov/about/steps-when-sick.html    CDC: Ending Home Isolation: www.cdc.gov/coronavirus/2019-ncov/hcp/disposition-in-home-patients.html     CDC: Caring for Someone: www.cdc.gov/coronavirus/2019-ncov/if-you-are-sick/care-for-someone.html     [facility]: Interim Guidance for Hospital Discharge to Home: www.health.Counts include 234 beds at the [facility].[place]/diseases/coronavirus/hcp/hospdischarge.pdf    [facility] clinical trials (COVID-19 research studies): [email or web address].[email or web address]/umn-clinical-trials     Below are the COVID-19 hotlines at the [place] Department of Health ([facility]). Interpreters are available.   o For  health questions: Call 200-283-3267 or 1-906.195.9617 (7 a.m. to 7 p.m.)  o For questions about schools and childcare: Call 872-167-7507 or 1-253.675.5428 (7 a.m. to 7 p.m.)     Glenna Velasco RN/[facility] Nurse Advisor      Reason for Disposition    HIGH RISK for severe COVID complications (e.g., age > 64 years, obesity with BMI > 25, pregnant, chronic lung disease or other chronic medical condition)  (Exception: Already seen by PCP and no new or worsening symptoms.)    Additional Information    Negative: SEVERE difficulty breathing (e.g., struggling for each breath, speaks in single words)    Negative: Difficult to awaken or acting confused (e.g., disoriented, slurred speech)    Negative: Bluish (or gray) lips or face now    Negative: Shock suspected (e.g., cold/pale/clammy skin, too weak to stand, low BP, rapid pulse)    Negative: Sounds like a life-threatening emergency to the triager    Negative: SEVERE or constant chest pain or pressure (Exception: mild central chest pain, present only when coughing)    Negative: MODERATE difficulty breathing (e.g., speaks in phrases, SOB even at rest, pulse 100-120)    Negative: [1] Headache AND [2] stiff neck (can't touch chin to chest)    Negative: MILD difficulty breathing (e.g., minimal/no SOB at rest, SOB with walking, pulse <100)    Negative: Chest pain or pressure    Negative: Patient sounds very sick or weak to the triager    Negative: Fever > 103 F (39.4 C)    Negative: [1] Fever > 101 F (38.3 C) AND [2] age > 60 years    Negative: [1] Fever > 100.0 F (37.8 C) AND [2] bedridden (e.g., nursing home patient, CVA, chronic illness, recovering from surgery)    Protocols used: CORONAVIRUS (COVID-19) DIAGNOSED OR [id number] 8.25.2021

## 2022-01-07 NOTE — PROGRESS NOTES
Brynn is a 41 year old who is being evaluated via a billable video visit.  - unable to do Video changed to telephone    How would you like to obtain your AVS?   If the video visit is dropped, the invitation should be resent by: Text to cell phone: [phone]  Will anyone else be joining your video visit? No          Assessment & Plan     Cough  Concerned about coughing  Reviewed OTC robitussin DM and Mucinex   Discussed pulse oximetry if positive for Covid   Has test scheduled later today already   If neg for covid and still having sob /cough concerns to call and schedule with [facility] for xray there early next week  If worsening this weekend then to  if needed.  - XR Chest 2 Views; Future    Exposure to 2019 novel coronavirus                     Return in about 2 weeks (around 1/21/2022) for recheck if not improving, regular primary provider.    LILLIE Barlow [facility]    Humberto Swain is a 41 year old who presents for the following health issues     HPI     Acute Illness  Acute illness concerns: Sore throat  Onset/Duration: 5 days  Symptoms:  Fever: no  Chills/Sweats: YES  Headache (location?): YES  Sinus Pressure: YES  Conjunctivitis:  no  Ear Pain: no  Rhinorrhea: YES  Congestion: YES  Sore Throat: YES  Cough: YES-non-productive  Wheeze: no  Decreased Appetite: YES  Nausea: no  Vomiting: no  Diarrhea: no  Dysuria/Freq.: no  Dysuria or Hematuria: no  Fatigue/Achiness: YES  Sick/Strep Exposure: A friend was positive for covid - Scheduled for testing this afternoon.   YES SOB -questions  No loss of taste or smell   No rash  Therapies tried and outcome: None              Review of Systems   Constitutional, HEENT, cardiovascular, pulmonary, gi and gu systems are negative, except as otherwise noted.      Objective           Vitals:  No vitals were obtained today due to virtual visit.    Physical Exam   GENERAL: Healthy, alert and no distress  RESP: No  audible wheeze, cough, able to speak in full sentences   PSYCH: Mentation appears normal, affect normal/bright, judgement and insight intact, normal speech and appearance well-groomed.                Video-Visit Details    Type of service:  telephone     Time:11 minutes

## 2022-01-17 ENCOUNTER — VIRTUAL VISIT (OUTPATIENT)
Dept: FAMILY MEDICINE | Facility: CLINIC | Age: 42
End: 2022-01-17
Payer: COMMERCIAL

## 2022-01-17 DIAGNOSIS — R05.9 COUGH: ICD-10-CM

## 2022-01-17 DIAGNOSIS — U07.1 COVID-19 VIRUS INFECTION: Primary | ICD-10-CM

## 2022-01-17 PROCEDURE — 99213 OFFICE O/P EST LOW 20 MIN: CPT | Mod: TEL | Performed by: PHYSICIAN ASSISTANT

## 2022-01-17 RX ORDER — DEXTROMETHORPHAN HYDROBROMIDE AND PROMETHAZINE HYDROCHLORIDE 15; 6.25 MG/5ML; MG/5ML
5 SYRUP ORAL EVERY 4 HOURS PRN
Qty: 118 ML | Refills: 0 | Status: SHIPPED | OUTPATIENT
Start: 2022-01-17 | End: 2023-07-23

## 2022-01-17 RX ORDER — ALBUTEROL SULFATE 90 UG/1
1-2 AEROSOL, METERED RESPIRATORY (INHALATION) EVERY 4 HOURS PRN
Qty: 6.7 G | Refills: 0 | Status: SHIPPED | OUTPATIENT
Start: 2022-01-17 | End: 2023-07-23

## 2022-01-17 RX ORDER — BENZONATATE 100 MG/1
100 CAPSULE ORAL 3 TIMES DAILY PRN
Qty: 25 CAPSULE | Refills: 0 | Status: SHIPPED | OUTPATIENT
Start: 2022-01-17 | End: 2023-07-23

## 2022-01-17 NOTE — PATIENT INSTRUCTIONS
rCys Swain,    Thank you for allowing [facility] to manage your care.    If you develop worsening/changing symptoms at any time, please go to the emergency department for evaluation.  I ordered some lab work, please go to the laboratory to get your studies.    I sent your prescriptions to your pharmacy.    For your pain, please use Ibuprofen 400mg four times daily with food. Between ibuprofen doses, you may use Tylenol 650mg.     Max acetaminophen (Tylenol) 4,000mg/24 hours  Max ibuprofen 3,200mg/24 hours    For cough not controlled by other medications, please use benzonatate or promethazine as prescribed. Do not use these medications within 4 hours of each other or while driving, operating machinery, with other sedating medications, or while drinking alcohol as it will make you drowsy.    If you have any questions or concerns, please feel free to call us at [phone]    Sincerely,    Arsenio Mason PA-C    Did you know?      You can schedule a video visit for follow-up appointments as well as future appointments for certain conditions.  Please see the below link.     [email or web address]    If you have not already done so,  I encourage you to sign up for [employer] ([email or web address]).  This will allow you to review your results, securely communicate with a provider, and schedule virtual visits as well.      Patient Education   Discharge Instructions for COVID-19 Patients  You have--or may have--COVID-19. Please follow the instructions listed below.   If you have a weakened immune system, discuss with your doctor any other actions you need to take.  How can I protect others?  If you have symptoms (fever, cough, body aches or trouble breathing):    Stay home and away from others (self-isolate) until:  ? Your other symptoms have resolved (gotten better). And   ? You've had no fever--and no medicine that reduces fever--for 1 full day (24 hours).  "And   ? At least 10 days have passed since your symptoms started. (You may need to wait 20 days. Follow the advice of your care team.)  If you don't show symptoms, but testing showed that you have COVID-19:    Stay home and away from others (self-isolate) until at least 10 days have passed since the date of your first positive COVID-19 test.  During this time    Stay in your own room, even for meals. Use your own bathroom if you can.    Stay away from others in your home. No hugging, kissing or shaking hands. No visitors.    Don't go to work, school or anywhere else.    Clean \"high touch\" surfaces often (doorknobs, counters, handles). Use household cleaning spray or wipes.    You'll find a full list of  on the EPA website: www.epa.gov/pesticide-registration/list-n-disinfectants-use-against-sars-cov-2.    Cover your mouth and nose with a mask or other face covering to avoid spreading germs.    Wash your hands and face often. Use soap and water.    Caregivers in these groups are at risk for severe illness due to COVID-19:  ? People 65 years and older  ? People who live in a nursing home or long-term care facility  ? People with chronic disease (lung, heart, cancer, diabetes, kidney, liver, immunologic)  ? People who have a weakened immune system, including those who:    Are in cancer treatment    Take medicine that weakens the immune system, such as corticosteroids    Had a bone marrow or organ transplant    Have an immune deficiency    Have poorly controlled HIV or AIDS    Are obese (body mass index of 40 or higher)    Smoke regularly    Caregivers should wear gloves while washing dishes, handling laundry and cleaning bedrooms and bathrooms.    Use caution when washing and drying laundry: Don't shake dirty laundry and use the warmest water setting that you can.    For more tips on managing your health at home, go to www.cdc.gov/coronavirus/2019-ncov/downloads/10Things.pdf.  How can I take care of myself at " home?  1. Get lots of rest. Drink extra fluids (unless a doctor has told you not to).  2. Take Tylenol (acetaminophen) for fever or pain. If you have liver or kidney problems, ask your family doctor if it's okay to take Tylenol.   Adults can take either:   ? 650 mg (two 325 mg pills) every 4 to 6 hours, or   ? 1,000 mg (two 500 mg pills) every 8 hours as needed.  ? Note: Don't take more than 3,000 mg in one day. Acetaminophen is found in many medicines (both prescribed and over-the-counter medicines). Read all labels to be sure you don't take too much.   For children, check the Tylenol bottle for the right dose. The dose is based on the child's age or weight.  3. If you have other health problems (like cancer, heart failure, an organ transplant or severe kidney disease): Call your specialty clinic if you don't feel better in the next 2 days.  4. Know when to call 911. Emergency warning signs include:  ? Trouble breathing or shortness of breath  ? Pain or pressure in the chest that doesn't go away  ? Feeling confused like you haven't felt before, or not being able to wake up  ? Bluish-colored lips or face  5. Your doctor may have prescribed a blood thinner medicine. Follow their instructions.  Where can I get more information?    [facility] - About COVID-19:   [email or web address]    CDC - What to Do If You're Sick: www.cdc.gov/coronavirus/2019-ncov/about/steps-when-sick.html    CDC - Ending Home Isolation: www.cdc.gov/coronavirus/2019-ncov/hcp/disposition-in-home-patients.html    CDC - Caring for Someone: www.cdc.gov/coronavirus/2019-ncov/if-you-are-sick/care-for-someone.html    [facility] - Interim Guidance for Hospital Discharge to Home: www.health.[facility].mn.us/diseases/coronavirus/hcp/hospdischarge.pdf    Below are the COVID-19 hotlines at the [place] Department of Health ([facility]). Interpreters are available.  ? For health questions: Call 343-091-7172 or 1-330.761.8678 (7 a.m. to 7 p.m.)  ? For  questions about schools and childcare: Call 343-986-6717 or 1-671.889.8253 (7 a.m. to 7 p.m.)    For informational purposes only. Not to replace the advice of your health care provider. Clinically reviewed by Dr. Kemar Gao.   Copyright   2020 [facility]. All rights reserved. [employer] 377366 - REV 01/05/21.

## 2022-01-17 NOTE — PROGRESS NOTES
Brynn is a 41 year old who is being evaluated via a billable phone visit.      How would you like to obtain your AVS? Mail a copy  If the visit is dropped, the invitation should be resent by: Text to cell phone: [phone]  Will anyone else be joining your visit? No  Start Time: 2:57 PM/3:31 PM      Assessment & Plan   Problem List Items Addressed This Visit     None      Visit Diagnoses     COVID-19 virus infection    -  Primary    Relevant Medications    benzonatate (TESSALON) 100 MG capsule    albuterol (PROAIR HFA/PROVENTIL HFA/VENTOLIN HFA) 108 (90 Base) MCG/ACT inhaler    promethazine-DM (PHENERGAN-DM) 6.25-15 MG/5ML syrup    Cough        Relevant Medications    benzonatate (TESSALON) 100 MG capsule    albuterol (PROAIR HFA/PROVENTIL HFA/VENTOLIN HFA) 108 (90 Base) MCG/ACT inhaler    promethazine-DM (PHENERGAN-DM) 6.25-15 MG/5ML syrup         Impression is ongoing COVID-19. Sounds well and non-toxic and I have low suspicion for impending airway obstruction or respiratory distress.  She will push p.o. fluids, use over-the-counter meds for symptoms, benzonatate, Phenergan, albuterol inhaler and follow-up with us in 2 weeks if not improving or urgent care/the ER if symptoms worsen/change at any time.    DDx and Dx discussed with and explained to the pt to their satisfaction.  All questions were answered at this time. Pt expressed understanding of and agreement with this dx, tx, and plan. No further workup warranted and standard medication warnings given. I have given the patient a list of pertinent indications for re-evaluation. Will go to the Emergency Department if symptoms worsen or new concerning symptoms arise. Patient left the call in no apparent distress.     26 minutes spent on the date of the encounter doing chart review, history and exam, documentation and further activities per the note     See Patient Instructions    Return in about 2 weeks (around 1/31/2022) for a recheck of your symptoms if not  improving, or call 911/go to an ER anytime if worsening.    PAPI Raya  [facility] SUSI Swain is a 41 year old who presents for the following health issues     HPI     Tested Pos for Covid 1/7/22. Symptoms started 1/5/22. Chest pain since having Covid only when coughing. Cough productive of mucus with dark brown material in it started last week. Eye pain and HA's when leaning forward. Has been taking rach, lemon and cough syrup. No neck stiffness, sob, lightheadedness, hemoptysis or other symptoms.    Review of Systems   Constitutional, HEENT, cardiovascular, pulmonary, gi and gu systems are negative, except as otherwise noted.      Objective       Vitals:  No vitals were obtained today due to virtual visit.    Physical Exam   GENERAL: Healthy, alert and no distress  RESP: No audible wheeze, cough, or visible cyanosis.  .  PSYCH: Mentation appears normal, affect normal/bright, judgement and insight intact, normal speech.    Telephone-Visit Details    Type of service:  Phone Visit    End Time:12:14 PM    Originating Location (pt. Location): Home    Distant Location (provider location):  [facility] SUSI     Platform used for Video Visit: Chanel

## 2022-01-17 NOTE — LETTER
January 17, 2022      Brynn Sprague  [address]  [facility] [place] [facility] [address]        To Whom It May Concern:    Brynn Sprague was seen in our clinic. She may return to work on 1/24/22 if she is feeling improved and has not had a fever for 24 hours without the use of fever reducing medications.      Sincerely,        Tristen Mason PA

## 2022-05-02 ENCOUNTER — OFFICE VISIT (OUTPATIENT)
Dept: FAMILY MEDICINE | Facility: CLINIC | Age: 42
End: 2022-05-02
Payer: COMMERCIAL

## 2022-05-02 VITALS
SYSTOLIC BLOOD PRESSURE: 111 MMHG | RESPIRATION RATE: 20 BRPM | OXYGEN SATURATION: 100 % | TEMPERATURE: 98.2 F | HEIGHT: 63 IN | BODY MASS INDEX: 27.29 KG/M2 | WEIGHT: 154 LBS | HEART RATE: 79 BPM | DIASTOLIC BLOOD PRESSURE: 73 MMHG

## 2022-05-02 DIAGNOSIS — C64.2 MALIGNANT NEOPLASM OF LEFT KIDNEY (H): ICD-10-CM

## 2022-05-02 DIAGNOSIS — Z97.5 IUD (INTRAUTERINE DEVICE) IN PLACE: ICD-10-CM

## 2022-05-02 DIAGNOSIS — Z13.220 LIPID SCREENING: ICD-10-CM

## 2022-05-02 DIAGNOSIS — Z00.00 ROUTINE GENERAL MEDICAL EXAMINATION AT A HEALTH CARE FACILITY: Primary | ICD-10-CM

## 2022-05-02 PROCEDURE — 99396 PREV VISIT EST AGE 40-64: CPT | Performed by: PHYSICIAN ASSISTANT

## 2022-05-02 ASSESSMENT — ENCOUNTER SYMPTOMS
ABDOMINAL PAIN: 0
DIARRHEA: 0
NERVOUS/ANXIOUS: 0
PALPITATIONS: 0
HEADACHES: 0
FREQUENCY: 0
HEMATURIA: 0
FEVER: 0
CONSTIPATION: 0
WEAKNESS: 0
DIZZINESS: 0
PARESTHESIAS: 0
BREAST MASS: 0
CHILLS: 0
ARTHRALGIAS: 0
HEMATOCHEZIA: 0
NAUSEA: 0
JOINT SWELLING: 0
DYSURIA: 0
COUGH: 0
MYALGIAS: 0
SHORTNESS OF BREATH: 0
SORE THROAT: 0
EYE PAIN: 0
HEARTBURN: 0

## 2022-05-02 ASSESSMENT — PAIN SCALES - GENERAL: PAINLEVEL: NO PAIN (0)

## 2022-05-02 NOTE — PROGRESS NOTES
SUBJECTIVE:   CC: Brynn Sprague is an 41 year old woman who presents for preventive health visit.       Patient has been advised of split billing requirements and indicates understanding: No  Healthy Habits:     Getting at least 3 servings of Calcium per day:  Yes    Bi-annual eye exam:  NO    Dental care twice a year:  Yes    Sleep apnea or symptoms of sleep apnea:  None    Diet:  Other    Frequency of exercise:  None    Taking medications regularly:  Yes    PHQ-2 Total Score: 0    Additional concerns today:  No        Today's PHQ-2 Score:   PHQ-2 ( 1999 Pfizer) 5/2/2022   Q1: Little interest or pleasure in doing things 0   Q2: Feeling down, depressed or hopeless 0   PHQ-2 Score 0   PHQ-2 Total Score (12-17 Years)- Positive if 3 or more points; Administer PHQ-A if positive -   Q1: Little interest or pleasure in doing things Not at all   Q2: Feeling down, depressed or hopeless Not at all   PHQ-2 Score 0       Abuse: Current or Past (Physical, Sexual or Emotional) - No  Do you feel safe in your environment? Yes    Have you ever done Advance Care Planning? (For example, a Health Directive, POLST, or a discussion with a medical provider or your loved ones about your wishes): No, advance care planning information given to patient to review.  Patient declined advance care planning discussion at this time.    Social History     Tobacco Use     Smoking status: Never Smoker     Smokeless tobacco: Never Used   Substance Use Topics     Alcohol use: No         Alcohol Use 5/2/2022   Prescreen: >3 drinks/day or >7 drinks/week? No   Prescreen: >3 drinks/day or >7 drinks/week? -       Reviewed orders with patient.  Reviewed health maintenance and updated orders accordingly - Yes  Lab work is in process  Labs reviewed in EPIC  BP Readings from Last 3 Encounters:   05/02/22 111/73   12/29/21 111/77   04/28/21 110/70    Wt Readings from Last 3 Encounters:   05/02/22 69.9 kg (154 lb)   12/29/21 63.5 kg (140 lb)   04/28/21 67.6  kg (149 lb)                  Patient Active Problem List   Diagnosis     CARDIOVASCULAR SCREENING; LDL GOAL LESS THAN 160     Cervical polyp     Impingement syndrome of right shoulder     Anal fissure     IUD contraception     Past Surgical History:   Procedure Laterality Date     DAVINCI NEPHRECTOMY PARTIAL Left 1/16/2017    Procedure: DAVINCI NEPHRECTOMY PARTIAL;  Surgeon: Jairo Cross MD;  Location: [facility] OR     INSERT INTRAUTERINE DEVICE  10/16/15       Social History     Tobacco Use     Smoking status: Never Smoker     Smokeless tobacco: Never Used   Substance Use Topics     Alcohol use: No     Family History   Problem Relation Age of Onset     Family History Negative Mother      Family History Negative Father      Cancer No family hx of          Current Outpatient Medications   Medication Sig Dispense Refill     levonorgestrel (MIRENA) 20 MCG/24HR IUD 1 each by Intrauterine route once       acetaminophen (TYLENOL) 325 MG tablet Take 325-650 mg by mouth every 6 hours as needed for mild pain (Patient not taking: No sig reported)       albuterol (PROAIR HFA/PROVENTIL HFA/VENTOLIN HFA) 108 (90 Base) MCG/ACT inhaler Inhale 1-2 puffs into the lungs every 4 hours as needed for shortness of breath / dyspnea or wheezing (Patient not taking: Reported on 5/2/2022) 6.7 g 0     ANUCORT-HC 25 MG suppository Place 1 suppository (25 mg) rectally 2 times daily (Patient not taking: No sig reported) 24 suppository 0     benzonatate (TESSALON) 100 MG capsule Take 1 capsule (100 mg) by mouth 3 times daily as needed for cough (Patient not taking: Reported on 5/2/2022) 25 capsule 0     ibuprofen (ADVIL/MOTRIN) 200 MG capsule Take 200 mg by mouth every 4 hours as needed for fever (Patient not taking: Reported on 5/2/2022)       promethazine-DM (PHENERGAN-DM) 6.25-15 MG/5ML syrup Take 5 mLs by mouth every 4 hours as needed for cough (Patient not taking: Reported on 5/2/2022) 118 mL 0     No Known Allergies  Recent Labs   Lab  Test 12/29/21  1322 12/29/21  1308 12/11/20  1327 12/11/20  1253 09/24/20  1458 10/13/17  0944 10/13/17  0942 01/16/17  1745 12/16/16  1143 10/28/16  0907   A1C  --   --   --   --  4.9  --   --   --   --   --    LDL  --   --   --   --  126*  --  90  --   --   --    HDL  --   --   --   --  42*  --  43*  --   --   --    TRIG  --   --   --   --  78  --  60  --   --   --    ALT  --   --   --   --   --   --   --   --  20 29   CR 0.6 0.55  --  0.63  --    < >  --    < > 0.58  --    GFRESTIMATED >60 >90 >90 >90  --    < >  --    < > >90  Non  GFR Calc    --    GFRESTBLACK  --   --  >90 >90  --    < >  --    < > >90   GFR Calc    --    POTASSIUM  --  4.0  --  3.8  --    < >  --    < > 3.7  --    TSH  --   --   --   --  2.34  --  1.98  --   --   --     < > = values in this interval not displayed.        Breast Cancer Screening:    FHS-7: No flowsheet data found.  click delete button to remove this line now  Mammogram Screening - Offered annual screening and updated Health Maintenance for [employer] plan based on risk factor consideration    Pertinent mammograms are reviewed under the imaging tab.    History of abnormal Pap smear: NO - age 30- 65 PAP every 3 years recommended  PAP / HPV Latest Ref Rng & Units 9/21/2017 5/13/2013 4/19/2012   PAP (Historical) - NIL NIL NIL   HPV16 NEG:Negative Negative - -   HPV18 NEG:Negative Negative - -   HRHPV NEG:Negative Negative - -     Reviewed and updated as needed this visit by clinical staff   Tobacco  Allergies  Meds   Med Hx  Surg Hx  Fam Hx  Soc Hx          Reviewed and updated as needed this visit by Provider                   Past Medical History:   Diagnosis Date     Chronic neutropenia (H)     possibly benign essential neutropenia     GERD (gastroesophageal reflux disease)      IUD (intrauterine device) in place     placed in 10/16/2015     Left renal mass      Normal pregnancy 2006,08,09,12     Pulmonary nodules      Renal cell carcinoma (H)        Past Surgical History:   Procedure Laterality Date     DAVINCI NEPHRECTOMY PARTIAL Left 1/16/2017    Procedure: DAVINCI NEPHRECTOMY PARTIAL;  Surgeon: Jairo Cross MD;  Location: [facility] OR     INSERT INTRAUTERINE DEVICE  10/16/15       Review of Systems   Constitutional: Negative for chills and fever.   HENT: Negative for congestion, ear pain, hearing loss and sore throat.    Eyes: Negative for pain and visual disturbance.   Respiratory: Negative for cough and shortness of breath.    Cardiovascular: Negative for chest pain, palpitations and peripheral edema.   Gastrointestinal: Negative for abdominal pain, constipation, diarrhea, heartburn, hematochezia and nausea.   Breasts:  Negative for tenderness, breast mass and discharge.   Genitourinary: Negative for dysuria, frequency, genital sores, hematuria, pelvic pain, urgency, vaginal bleeding and vaginal discharge.   Musculoskeletal: Negative for arthralgias, joint swelling and myalgias.   Skin: Negative for rash.   Neurological: Negative for dizziness, weakness, headaches and paresthesias.   Psychiatric/Behavioral: Negative for mood changes. The patient is not nervous/anxious.      CONSTITUTIONAL: NEGATIVE for fever, chills, change in weight  INTEGUMENTARU/SKIN: NEGATIVE for worrisome rashes, moles or lesions  EYES: NEGATIVE for vision changes or irritation  ENT: NEGATIVE for ear, mouth and throat problems  RESP: NEGATIVE for significant cough or SOB  BREAST: NEGATIVE for masses, tenderness or discharge  CV: NEGATIVE for chest pain, palpitations or peripheral edema  GI: NEGATIVE for nausea, abdominal pain, heartburn, or change in bowel habits  : NEGATIVE for unusual urinary or vaginal symptoms. Periods are regular.  MUSCULOSKELETAL: NEGATIVE for significant arthralgias or myalgia  NEURO: NEGATIVE for weakness, dizziness or paresthesias  PSYCHIATRIC: NEGATIVE for changes in mood or affect     OBJECTIVE:   /73   Pulse 79   Temp 98.2  F (36.8  C)  "(Tympanic)   Resp 20   Ht 1.6 m (5' 3\")   Wt 69.9 kg (154 lb)   SpO2 100%   Breastfeeding No   BMI 27.28 kg/m    Physical Exam  GENERAL: healthy, alert and no distress  EYES: Eyes grossly normal to inspection, PERRL and conjunctivae and sclerae normal  HENT: ear canals and TM's normal, nose and mouth without ulcers or lesions  NECK: no adenopathy, no asymmetry, masses, or scars and thyroid normal to palpation  RESP: lungs clear to auscultation - no rales, rhonchi or wheezes  BREAST: normal without masses, tenderness or nipple discharge and no palpable axillary masses or adenopathy  CV: regular rate and rhythm, normal S1 S2, no S3 or S4, no murmur, click or rub, no peripheral edema and peripheral pulses strong  ABDOMEN: soft, nontender, no hepatosplenomegaly, no masses and bowel sounds normal  MS: no gross musculoskeletal defects noted, no edema  SKIN: no suspicious lesions or rashes  NEURO: Normal strength and tone, mentation intact and speech normal  PSYCH: mentation appears normal, affect normal/bright    Diagnostic Test Results:  Labs reviewed in Epic    ASSESSMENT/PLAN:       ICD-10-CM    1. Routine general medical examination at a health care facility  Z00.00 Lipid panel reflex to direct LDL Fasting   2. Malignant neoplasm of left kidney (H)  C64.2 Basic metabolic panel  (Ca, Cl, CO2, Creat, Gluc, K, Na, BUN)   3. Lipid screening  Z13.220    4. IUD (intrauterine device) in place  Z97.5 Ob/Gyn Referral       Patient has been advised of split billing requirements and indicates understanding: Yes    COUNSELING:  Reviewed preventive health counseling, as reflected in patient instructions       Regular exercise       Healthy diet/nutrition    Estimated body mass index is 27.28 kg/m  as calculated from the following:    Height as of this encounter: 1.6 m (5' 3\").    Weight as of this encounter: 69.9 kg (154 lb).    Weight management plan: Discussed healthy diet and exercise guidelines    She reports that she " has never smoked. She has never used smokeless tobacco.      Counseling Resources:  ATP IV Guidelines  Pooled Cohorts Equation Calculator  Breast Cancer Risk Calculator  BRCA-Related Cancer Risk Assessment: FHS-7 Tool  FRAX Risk Assessment  ICSI Preventive Guidelines  Dietary Guidelines for Americans, 2010  USDA's MyPlate  ASA Prophylaxis  Lung CA Screening    LILLIE [facility]

## 2022-05-04 ENCOUNTER — LAB (OUTPATIENT)
Dept: LAB | Facility: CLINIC | Age: 42
End: 2022-05-04
Payer: COMMERCIAL

## 2022-05-04 ENCOUNTER — TELEPHONE (OUTPATIENT)
Dept: FAMILY MEDICINE | Facility: CLINIC | Age: 42
End: 2022-05-04

## 2022-05-04 DIAGNOSIS — Z11.59 NEED FOR HEPATITIS C SCREENING TEST: Primary | ICD-10-CM

## 2022-05-04 DIAGNOSIS — C64.2 MALIGNANT NEOPLASM OF LEFT KIDNEY (H): ICD-10-CM

## 2022-05-04 DIAGNOSIS — Z00.00 ROUTINE GENERAL MEDICAL EXAMINATION AT A HEALTH CARE FACILITY: ICD-10-CM

## 2022-05-04 LAB
ANION GAP SERPL CALCULATED.3IONS-SCNC: 5 MMOL/L (ref 3–14)
BUN SERPL-MCNC: 6 MG/DL (ref 7–30)
CALCIUM SERPL-MCNC: 8.6 MG/DL (ref 8.5–10.1)
CHLORIDE BLD-SCNC: 110 MMOL/L (ref 94–109)
CHOLEST SERPL-MCNC: 157 MG/DL
CO2 SERPL-SCNC: 24 MMOL/L (ref 20–32)
CREAT SERPL-MCNC: 0.56 MG/DL (ref 0.52–1.04)
FASTING STATUS PATIENT QL REPORTED: YES
GFR SERPL CREATININE-BSD FRML MDRD: >90 ML/MIN/1.73M2
GLUCOSE BLD-MCNC: 99 MG/DL (ref 70–99)
HCV AB SERPL QL IA: NONREACTIVE
HDLC SERPL-MCNC: 46 MG/DL
LDLC SERPL CALC-MCNC: 99 MG/DL
NONHDLC SERPL-MCNC: 111 MG/DL
POTASSIUM BLD-SCNC: 4.6 MMOL/L (ref 3.4–5.3)
SODIUM SERPL-SCNC: 139 MMOL/L (ref 133–144)
TRIGL SERPL-MCNC: 58 MG/DL

## 2022-05-04 PROCEDURE — 86803 HEPATITIS C AB TEST: CPT

## 2022-05-04 PROCEDURE — 80048 BASIC METABOLIC PNL TOTAL CA: CPT

## 2022-05-04 PROCEDURE — 36415 COLL VENOUS BLD VENIPUNCTURE: CPT

## 2022-05-04 PROCEDURE — 80061 LIPID PANEL: CPT

## 2022-05-04 NOTE — TELEPHONE ENCOUNTER
Biometric screen for [place] to complete. Darcie would like to  at [facility]  by 5/6/22 if possible. Please call her [phone]  Cathy Salguero on 5/4/2022 at 10:33 AM

## 2022-05-09 NOTE — TELEPHONE ENCOUNTER
Completed biometric screening form placed at [facility]  for . Left brief message for patient, clinic # given if needed.

## 2022-06-06 ENCOUNTER — OFFICE VISIT (OUTPATIENT)
Dept: OBGYN | Facility: CLINIC | Age: 42
End: 2022-06-06
Payer: COMMERCIAL

## 2022-06-06 VITALS
HEART RATE: 74 BPM | BODY MASS INDEX: 27.21 KG/M2 | OXYGEN SATURATION: 100 % | SYSTOLIC BLOOD PRESSURE: 103 MMHG | DIASTOLIC BLOOD PRESSURE: 69 MMHG | WEIGHT: 153.6 LBS

## 2022-06-06 DIAGNOSIS — T83.32XA INTRAUTERINE CONTRACEPTIVE DEVICE THREADS LOST, INITIAL ENCOUNTER: ICD-10-CM

## 2022-06-06 DIAGNOSIS — Z01.411 ENCOUNTER FOR GYNECOLOGICAL EXAMINATION WITH ABNORMAL FINDING: Primary | ICD-10-CM

## 2022-06-06 DIAGNOSIS — Z30.09 STERILIZATION CONSULT: ICD-10-CM

## 2022-06-06 PROCEDURE — G0145 SCR C/V CYTO,THINLAYER,RESCR: HCPCS | Performed by: OBSTETRICS & GYNECOLOGY

## 2022-06-06 PROCEDURE — 87624 HPV HI-RISK TYP POOLED RSLT: CPT | Performed by: OBSTETRICS & GYNECOLOGY

## 2022-06-06 PROCEDURE — G0124 SCREEN C/V THIN LAYER BY MD: HCPCS | Performed by: PATHOLOGY

## 2022-06-06 PROCEDURE — 99386 PREV VISIT NEW AGE 40-64: CPT | Mod: 25 | Performed by: OBSTETRICS & GYNECOLOGY

## 2022-06-06 NOTE — PROGRESS NOTES
Brynn is a 41 year old  here for annual exam.   Requests pap.  She also wants to discuss IUD removal in the future. I explained, that her MIRENA IUD is overdue for removal (6.5 yrs).    She wants to discuss permanent sterilization.    ROS: Ten point review of systems was reviewed and negative except the above.    Health Maintenance   Topic Date Due     HPV TEST  2020     PAP  2020     COVID-19 Vaccine (3 - Booster for Moderna series) 09/10/2021     INFLUENZA VACCINE (Season Ended) 2022     PREVENTIVE CARE VISIT  2023     ADVANCE CARE PLANNING  2027     DTAP/TDAP/TD IMMUNIZATION (4 - Td or Tdap) 2030     HEPATITIS C SCREENING  Completed     HIV SCREENING  Completed     PHQ-2 (once per calendar year)  Completed     Pneumococcal Vaccine: Pediatrics (0 to 5 Years) and At-Risk Patients (6 to 64 Years)  Aged Out     IPV IMMUNIZATION  Aged Out     MENINGITIS IMMUNIZATION  Aged Out     HEPATITIS B IMMUNIZATION  Aged Out      Last pap: 2017  Last Mammogram: none  Last Dexa: none  Last Colonoscopy: none  Lab Results   Component Value Date    CHOL 157 2022    CHOL 184 2020     Lab Results   Component Value Date    HDL 46 2022    HDL 42 2020     Lab Results   Component Value Date    LDL 99 2022     2020     Lab Results   Component Value Date    TRIG 58 2022    TRIG 78 2020     No results found for: CHOLHDLRATIO      OBHX:    OB History    Para Term  AB Living   6 4 4 0 2 4   SAB IAB Ectopic Multiple Live Births   1 1 0 0 4      # Outcome Date GA Lbr Armando/2nd Weight Sex Delivery Anes PTL Lv   6 Term 12 40w3d  3.175 kg (7 lb) F    RAISSA      Birth Comments: uncomplicated      Name: Feenaa   5 Term  39w3d  3.175 kg (7 lb) M Vag-Spont   RAISSA      Birth Comments: uncomplicated      Name: Carmela   4 Term  39w4d  3.317 kg (7 lb 5 oz) F Vag-Spont   RAISSA      Birth Comments: uncomplicated      Name: Elisa    3 Term 08/2006 39w4d  3.402 kg (7 lb 8 oz) M Vag-Spont   RAISSA      Birth Comments: uncomplicated      Name: Maral Morales   2 SAB 2004        DEC      Birth Comments: D&C   1 IAB 2001        DEC       Past Surgical History:   Procedure Laterality Date     DAVINCI NEPHRECTOMY PARTIAL Left 1/16/2017    Procedure: DAVINCI NEPHRECTOMY PARTIAL;  Surgeon: Jairo Cross MD;  Location: [facility] OR     INSERT INTRAUTERINE DEVICE  10/16/15       PMH: Her past medical, surgical, and obstetric histories were reviewed and are documented in their appropriate chart areas.    ALL/Meds: Her medication and allergy histories were reviewed and are documented in their appropriate chart areas.    SH/FMH: Her social and family history was reviewed and documented in its appropriate chart area.    PE: /69   Pulse 74   Wt 69.7 kg (153 lb 9.6 oz)   LMP 05/07/2022 (Exact Date)   SpO2 100%   Breastfeeding No   BMI 27.21 kg/m    Body mass index is 27.21 kg/m .    General Appearance:  healthy, alert, active, no distress  Cardiovascular:  Regular rate and Rhythm  Neck: Supple, no adenopathy and thyroid normal  Lungs:  Clear, without wheeze, rale or rhonchi  Breast: normal breast exam  Abdomen: Benign, Soft, flat, non-tender, No masses, organomegaly, No inguinal nodes and Bowel sounds normoactiveSoft, nontender.   Pelvic:       - Ext: Vulva and perineum are normal without lesion, mass or discharge        - Urethra: normal without discharge or scarring  hypermobility       - Urethral Meatus: normal appearance,        - Bladder: no tenderness, no masses       - Vagina: Normal mucosa, no discharge     rugated       - Cervix: normal/ IUD strings not seed. Unable to tease with brush,       - Uterus:Normal shape, position and consistencyfirm, nontender, nongravid uterus without CMT       - Adnexa: Normal without masses or tenderness       - Rectal: deferred    A/P:  Well Woman,     ICD-10-CM    1. Encounter for gynecological  examination with abnormal finding  Z01.411 Pap imaged thin layer screen with HPV - recommended age 30 - 65 years (select HPV order below)     US Pelvic Transabdominal and Transvaginal     MA Screen Bilateral w/Hussein   2. Intrauterine contraceptive device threads lost, initial encounter  T83.32XA US Pelvic Transabdominal and Transvaginal   3. Sterilization consult  Z30.09      Reviewed R/B/A of Tubal Ligation including but not limited to bleeding, infection, damage to other organs, 4-7/1000 failure, increased risk of ectopic, and risk of regret.  Reviewed permanence of procedure.  Reviewed pre and post op course.  All questions answered.   She will discuss Vasectomy as alterrnative with souse.    We also discussed may need Hysteroscopy to remove IUD. The Sterilization can then be done at the same time.    ACOG pamphlets were provided on the above topics.    Sterilization consent is not yet signed.     - Encouraged self-breast exam   - Encouraged low fat diet, regular exercise, and adequate calcium intake.    CEPHAS AGBEH, MD.

## 2022-06-06 NOTE — PATIENT INSTRUCTIONS
To Schedule an Appointment 24/7  Call: [phone]    If you have any questions regarding your visit, Please contact your care team.  [facility]: [phone]  [facility] HOURS TELEPHONE NUMBER   Cephas Agbeh, M.D.      Purnima Caceres-Surgery Scheduler  Halie-Surgery Scheduler         Monday - [place]:    8:00 am-4:45 pm  Tuesday - [place]:   8:00 am-4:45 pm  Friday-[facility]:       8:00 am-4:45 pm  Typical Surgery Day:  Wednesday [facility]   [address]   [place] [address] [phone]   Fax [phone]    Imaging Scheduling all locations  [phone]      [facility]   [address] [place] [address] [address].   [place], [place] [address] [phone]    [facility]  [address] [facility] [address]  [phone]  Fax [phone]     Urgent Care locations:  [facility] Monday-Friday                               10 am - 8 pm  Saturday and Sunday                      9 am - 5 pm  Monday-Friday                              10 am- 8 pm  Saturday and Sunday                      9 am - 5 pm    [phone]   **Surgeries** Our Surgery Schedulers will contact you to schedule. If you do not receive a call within 3 business days, please call [phone].  If you need a medication refill, please contact your pharmacy. Please allow 3 business days for your refill to be completed.  As always, Thank you for trusting us with your healthcare needs!  see additional instructions from your care team below

## 2022-06-09 ENCOUNTER — ANCILLARY PROCEDURE (OUTPATIENT)
Dept: ULTRASOUND IMAGING | Facility: CLINIC | Age: 42
End: 2022-06-09
Attending: OBSTETRICS & GYNECOLOGY
Payer: COMMERCIAL

## 2022-06-09 DIAGNOSIS — T83.32XA INTRAUTERINE CONTRACEPTIVE DEVICE THREADS LOST, INITIAL ENCOUNTER: ICD-10-CM

## 2022-06-09 DIAGNOSIS — Z01.411 ENCOUNTER FOR GYNECOLOGICAL EXAMINATION WITH ABNORMAL FINDING: ICD-10-CM

## 2022-06-09 LAB
BKR LAB AP GYN ADEQUACY: NORMAL
BKR LAB AP GYN INTERPRETATION: NORMAL
BKR LAB AP HPV REFLEX: NORMAL
BKR LAB AP LMP: NORMAL
BKR LAB AP PREVIOUS ABNORMAL: NORMAL
PATH REPORT.COMMENTS IMP SPEC: NORMAL
PATH REPORT.COMMENTS IMP SPEC: NORMAL
PATH REPORT.RELEVANT HX SPEC: NORMAL

## 2022-06-09 PROCEDURE — 76856 US EXAM PELVIC COMPLETE: CPT | Mod: TC | Performed by: RADIOLOGY

## 2022-06-09 PROCEDURE — 76830 TRANSVAGINAL US NON-OB: CPT | Mod: TC | Performed by: RADIOLOGY

## 2022-06-10 DIAGNOSIS — N83.291 COMPLEX CYST OF RIGHT OVARY: Primary | ICD-10-CM

## 2022-06-13 LAB
HUMAN PAPILLOMA VIRUS 16 DNA: NEGATIVE
HUMAN PAPILLOMA VIRUS 18 DNA: NEGATIVE
HUMAN PAPILLOMA VIRUS FINAL DIAGNOSIS: NORMAL
HUMAN PAPILLOMA VIRUS OTHER HR: NEGATIVE

## 2022-06-28 ENCOUNTER — LAB (OUTPATIENT)
Dept: LAB | Facility: CLINIC | Age: 42
End: 2022-06-28
Payer: COMMERCIAL

## 2022-06-28 DIAGNOSIS — N83.291 COMPLEX CYST OF RIGHT OVARY: ICD-10-CM

## 2022-06-28 LAB — CANCER AG125 SERPL-ACNC: 20 U/ML

## 2022-06-28 PROCEDURE — 86304 IMMUNOASSAY TUMOR CA 125: CPT

## 2022-06-28 PROCEDURE — 36415 COLL VENOUS BLD VENIPUNCTURE: CPT

## 2022-07-11 ENCOUNTER — OFFICE VISIT (OUTPATIENT)
Dept: OBGYN | Facility: CLINIC | Age: 42
End: 2022-07-11
Payer: COMMERCIAL

## 2022-07-11 VITALS
BODY MASS INDEX: 27.35 KG/M2 | DIASTOLIC BLOOD PRESSURE: 66 MMHG | OXYGEN SATURATION: 100 % | WEIGHT: 154.4 LBS | HEART RATE: 80 BPM | SYSTOLIC BLOOD PRESSURE: 99 MMHG

## 2022-07-11 DIAGNOSIS — Z30.09 STERILIZATION CONSULT: ICD-10-CM

## 2022-07-11 DIAGNOSIS — Z32.00 PREGNANCY EXAMINATION OR TEST, PREGNANCY UNCONFIRMED: Primary | ICD-10-CM

## 2022-07-11 DIAGNOSIS — Z30.432 ENCOUNTER FOR IUD REMOVAL: ICD-10-CM

## 2022-07-11 PROCEDURE — 99213 OFFICE O/P EST LOW 20 MIN: CPT | Mod: 25 | Performed by: OBSTETRICS & GYNECOLOGY

## 2022-07-11 PROCEDURE — 58301 REMOVE INTRAUTERINE DEVICE: CPT | Mod: 52 | Performed by: OBSTETRICS & GYNECOLOGY

## 2022-07-11 NOTE — PROGRESS NOTES
Brynn is a 41 year old  referred here by self for consultation regarding IUD removal and possible replacement. At last visit IUD strings could not be seen. Ultrasound was done as bellow.  Patient also considering sterilization since she is done having children.    .  Narrative & Impression   ULTRASOUND PELVIC TRANSABDOMINAL AND TRANSVAGINAL 2022 3:02 PM     CLINICAL HISTORY: Lost IUD strings. Encounter for gynecological  examination with abnormal finding. Intrauterine contraceptive device  threads lost, initial encounter.     TECHNIQUE: Transabdominal scans were performed. Endovaginal ultrasound  was performed to better visualize the adnexa.     COMPARISON: None.     FINDINGS:     UTERUS: 12.3 x 6.6 x 6.8 cm. Hypoechoic intramural posterior uterine  mass compatible with fibroid measures 2.2 x 1.8 x 2.1 cm.     ENDOMETRIUM: 3 mm. Appropriately positioned IUD noted.     RIGHT OVARY: 6.5 x 3.3 x 3.0 cm. There are two complex ovarian cysts  on the right measuring up to 2.8 and 2.0 cm respectively. Normal color  flow to the right ovary.     LEFT OVARY: 4.1 x 2.9 x 2.8 cm. Normal with flow demonstrated.     Small amount of clear pelvic free fluid.                                                                      IMPRESSION:  1.  Appropriately positioned intrauterine device.  2.  There are two complex cysts in the right ovary measuring up to 2.8  cm.         ROS: Ten point review of systems was reviewed and negative except the above.    Gyne: - abn pap (last pap ), - STD's    Past Medical History:   Diagnosis Date     Chronic neutropenia (H)     possibly benign essential neutropenia     GERD (gastroesophageal reflux disease)      IUD (intrauterine device) in place     placed in 10/16/2015     Left renal mass      Normal pregnancy ,,,12     Pulmonary nodules      Renal cell carcinoma (H)      Past Surgical History:   Procedure Laterality Date     DAVINCI NEPHRECTOMY PARTIAL Left 2017     Procedure: DAVINCI NEPHRECTOMY PARTIAL;  Surgeon: Jairo Cross MD;  Location: [facility] OR     INSERT INTRAUTERINE DEVICE  10/16/15     Patient Active Problem List   Diagnosis     CARDIOVASCULAR SCREENING; LDL GOAL LESS THAN 160     Cervical polyp     Impingement syndrome of right shoulder     Anal fissure     IUD contraception       ALL/Meds: Her medication and allergy histories were reviewed and are documented in their appropriate chart areas.    SH: - tob, - EtOH,     FH: Her family history was reviewed and documented in its appropriate chart area.    PE: BP 99/66   Pulse 80   Wt 70 kg (154 lb 6.4 oz)   LMP 06/17/2022 (Approximate)   SpO2 100%   Breastfeeding No   BMI 27.35 kg/m    Body mass index is 27.35 kg/m .    General Appearance:  healthy, alert, active, no distress  HEENT: NCAT  Abdomen: Soft, nontender.  Normal bowel sounds.  No masses  Pelvic:       - Ext: NEFG,        - Urethra: no lesions, no masses, no hypermobility       - Urethral Meatus: normal appearance,        - Bladder: nio tenderness, no masses       - Vagina: pink, moist, normal rugae, no lesions, no discharge       - Cervix: no lesions, parous. IUD strings are not visible.    Unable to remove IUD with the IUD hook or perlita forceps..         - Uterus: normal sized, AV mobile, normal contour       - Adnexa: no masses, no tenderness       - Rectal: deferred, normal tone, negative guaic       - Pelvic support: no cystocele, no rectocele, no uterine prolapse  Nurse present for exam.    A/P    ICD-10-CM    1. Pregnancy examination or test, pregnancy unconfirmed  Z32.00    2. Sterilization consult  Z30.09    3. Encounter for IUD removal  Z30.432    Reviewed different methods of sterilization including vasectomy,  LSC Salpingectomy. Reviewed pros/cons of different methods of sterilization. Reviewed that compared to female sterilization, male sterilization is an in office procedure and less surgically risky due to the external  placement of the male reproductive organs. Reviewed that LSC TL requires general anesthesia, 2-3 small incisions, and is effective immediately     We Discussed Operative Hysteroscopy with IUD removal with possible reisertion or proceeding with Sterilization..    She will discuss options with her spouse and call to schedule surgery. After July 2022.      Reviewed the risks,benefits and alternatives of Tubal Ligation including but not limited to bleeding, infection, injury to bowel,bladder and other organs, failure rate of 3-5/1000, increased risk of ectopic or tubal pregnancy.  Reviewed permanence of procedure.  Reviewed pre and post op course.  All questions answered.  Preop with PCP discussed..     ACOG pamphlets provided on the above topics.    Total time preparing to see patient with reviewing prior encounter and labs, face to face time,  and coordinating care on the same calendar date: 30 mins.    CEPHAS AGBEH, MD.     Consent for steri;ization signed.  Michelle Mcdermott MA

## 2022-07-11 NOTE — PATIENT INSTRUCTIONS
To Schedule an Appointment 24/7  Call: [phone]    If you have any questions regarding your visit, Please contact your care team.  [facility]: [phone]  [facility] HOURS TELEPHONE NUMBER   Cephas Agbeh, M.D.      Richie Caceres-Surgery Scheduler  Halie-Surgery Scheduler         Monday - [place]:    8:00 am-4:45 pm  Tuesday - [place]:   8:00 am-4:45 pm  Friday-[place]:       8:00 am-4:45 pm  Typical Surgery Day:  Wednesday [facility]   [address]   [place] [id number] [phone]   Fax [phone]    Imaging Scheduling all locations  [phone]      [facility]   [address] [place] [address] [address]   [place], [place] [address] [phone]    [facility]  [address] [place] [facility] [address]  [phone]  Fax [phone]     Urgent Care locations:  [facility] Monday-Friday                               10 am - 8 pm  Saturday and Sunday                      9 am - 5 pm  Monday-Friday                              10 am- 8 pm  Saturday and Sunday                      9 am - 5 pm    [phone]   **Surgeries** Our Surgery Schedulers will contact you to schedule. If you do not receive a call within 3 business days, please call [phone].  If you need a medication refill, please contact your pharmacy. Please allow 3 business days for your refill to be completed.  As always, Thank you for trusting us with your healthcare needs!  see additional instructions from your care team below

## 2022-07-14 ENCOUNTER — ANCILLARY PROCEDURE (OUTPATIENT)
Dept: MAMMOGRAPHY | Facility: CLINIC | Age: 42
End: 2022-07-14
Attending: OBSTETRICS & GYNECOLOGY
Payer: COMMERCIAL

## 2022-07-14 DIAGNOSIS — Z01.411 ENCOUNTER FOR GYNECOLOGICAL EXAMINATION WITH ABNORMAL FINDING: ICD-10-CM

## 2022-07-14 PROCEDURE — 77067 SCR MAMMO BI INCL CAD: CPT | Mod: TC | Performed by: RADIOLOGY

## 2022-10-06 ENCOUNTER — NURSE TRIAGE (OUTPATIENT)
Dept: NURSING | Facility: CLINIC | Age: 42
End: 2022-10-06

## 2022-10-06 NOTE — TELEPHONE ENCOUNTER
Nurse Triage SBAR    Is this a 2nd Level Triage? NO    Situation: Brynn called stating her KIT (who she lives with) was dx with TB today. She is wanting to know what kind of isolation precautions she should take.    Background: Brynn states she lives with her KIT as well as her  and 4 children at home.    Assessment: She denies any feelings of illness.     Protocol Recommended Disposition:   See PCP Within 3 Days    Recommendation: I did go over care advice with Brynn as well as [facility] information. I attempted to call them but there was no answer. I gave her the number to call herself and encouraged her to do so. She states that her KIT has a virtual appt tomorrow and is wanting to wait until then to decide about making an appt for herself or anyone else in her household.     Sidra Ramírez RN  [facility] Nurse Advisor   10/6/2022  3:34 PM    Reason for Disposition    Household member with EXPOSURE to Tuberculosis (e.g., family member, lives in same apartment)    Additional Information    Negative: Patient sounds very sick or weak to the triager    Negative: [1] EXPOSURE to person with Tuberculosis AND [2] now having tuberculosis symptoms (cough, fever, weight loss, or night sweats)    Negative: Healthcare provider with EXPOSURE to Tuberculosis    Protocols used: TUBERCULOSIS EXPOSURE-A-

## 2022-11-18 ENCOUNTER — OFFICE VISIT (OUTPATIENT)
Dept: URGENT CARE | Facility: URGENT CARE | Age: 42
End: 2022-11-18
Payer: COMMERCIAL

## 2022-11-18 ENCOUNTER — NURSE TRIAGE (OUTPATIENT)
Dept: FAMILY MEDICINE | Facility: CLINIC | Age: 42
End: 2022-11-18

## 2022-11-18 VITALS
DIASTOLIC BLOOD PRESSURE: 76 MMHG | HEART RATE: 99 BPM | BODY MASS INDEX: 27.28 KG/M2 | RESPIRATION RATE: 20 BRPM | OXYGEN SATURATION: 100 % | TEMPERATURE: 100.5 F | WEIGHT: 154 LBS | SYSTOLIC BLOOD PRESSURE: 123 MMHG

## 2022-11-18 DIAGNOSIS — J02.9 ACUTE PHARYNGITIS, UNSPECIFIED ETIOLOGY: Primary | ICD-10-CM

## 2022-11-18 DIAGNOSIS — R50.9 FEVER IN ADULT: ICD-10-CM

## 2022-11-18 DIAGNOSIS — R05.1 ACUTE COUGH: ICD-10-CM

## 2022-11-18 DIAGNOSIS — J06.9 VIRAL URI WITH COUGH: ICD-10-CM

## 2022-11-18 LAB
DEPRECATED S PYO AG THROAT QL EIA: NEGATIVE
FLUAV AG SPEC QL IA: NEGATIVE
FLUBV AG SPEC QL IA: NEGATIVE

## 2022-11-18 PROCEDURE — U0005 INFEC AGEN DETEC AMPLI PROBE: HCPCS | Performed by: PHYSICIAN ASSISTANT

## 2022-11-18 PROCEDURE — 99214 OFFICE O/P EST MOD 30 MIN: CPT | Mod: CS | Performed by: PHYSICIAN ASSISTANT

## 2022-11-18 PROCEDURE — U0003 INFECTIOUS AGENT DETECTION BY NUCLEIC ACID (DNA OR RNA); SEVERE ACUTE RESPIRATORY SYNDROME CORONAVIRUS 2 (SARS-COV-2) (CORONAVIRUS DISEASE [COVID-19]), AMPLIFIED PROBE TECHNIQUE, MAKING USE OF HIGH THROUGHPUT TECHNOLOGIES AS DESCRIBED BY CMS-2020-01-R: HCPCS | Performed by: PHYSICIAN ASSISTANT

## 2022-11-18 PROCEDURE — 87804 INFLUENZA ASSAY W/OPTIC: CPT | Mod: 59 | Performed by: PHYSICIAN ASSISTANT

## 2022-11-18 PROCEDURE — 87651 STREP A DNA AMP PROBE: CPT | Performed by: PHYSICIAN ASSISTANT

## 2022-11-18 PROCEDURE — 87804 INFLUENZA ASSAY W/OPTIC: CPT | Performed by: PHYSICIAN ASSISTANT

## 2022-11-18 RX ORDER — GUAIFENESIN AND DEXTROMETHORPHAN HYDROBROMIDE 600; 30 MG/1; MG/1
1 TABLET, EXTENDED RELEASE ORAL EVERY 12 HOURS
COMMUNITY
End: 2023-07-23

## 2022-11-18 RX ORDER — CODEINE PHOSPHATE AND GUAIFENESIN 10; 100 MG/5ML; MG/5ML
1-2 SOLUTION ORAL EVERY 4 HOURS PRN
Qty: 118 ML | Refills: 0 | Status: SHIPPED | OUTPATIENT
Start: 2022-11-18 | End: 2023-07-23

## 2022-11-18 RX ORDER — BENZONATATE 100 MG/1
CAPSULE ORAL
Qty: 30 CAPSULE | Refills: 0 | Status: SHIPPED | OUTPATIENT
Start: 2022-11-18 | End: 2023-07-23

## 2022-11-18 NOTE — TELEPHONE ENCOUNTER
Reason for Call:  Other appointment    Detailed comments: 11/7/2022 sore throat negative covid and since 11.16/2022 dry cough chest is hurting when coughing. Would like to been seen today so she can get and wanted some test strep and influenza.    Phone Number Patient can be reached at: Home number on file [phone] (home)    Best Time: any    Can we leave a detailed message on this number? YES    Call taken on 11/18/2022 at 7:28 AM by Lisa Barron

## 2022-11-18 NOTE — TELEPHONE ENCOUNTER
Notified patient per message below to go to .    They stated understanding and agreeable with the plan of care.     Blanka RN,BSN  Triage Nurse  [facility]: [facility]

## 2022-11-18 NOTE — TELEPHONE ENCOUNTER
She could either do a MyChart E-visit for testing and medication or she can follow-up with urgent care. LÁZARO Kaiser, FNP-BC

## 2022-11-18 NOTE — TELEPHONE ENCOUNTER
Patient does not have mychart, so unable to do an evisit.    Called [phone] (home) [phone] (work)    Did they answer the phone: No, left a message on voicemail to return call to the [facility] at [phone].    Blanka RN,BSN  Triage Nurse  [facility]: [facility]

## 2022-11-18 NOTE — TELEPHONE ENCOUNTER
CARE ADVICE:  SEE TODAY/TOMORROW IN OFFICE.      NO AVALIABLE APPOINTMENTS    Urgent Care?    Patient requesting flu/ strep testing.    Patient also requesting refill of tessalon  Given 1/2022 when patient had covid.    RN received call from patient     Patient calling a second time after initial phone call this AM.  Called at 7:30 requesting same day appointment.    Patient states she has had a cough since 11/16.  Initially started with sore throat 11/7    Tested negative for Covid.    Chest hurts when patient coughs.    Denies fever, wheezing ,difficulty breathing.    Cough is dry, no blood.        Reason for Disposition    Continuous (nonstop) coughing interferes with work or school and no improvement using cough treatment per Care Advice    Additional Information    Negative: Bluish (or gray) lips or face    Negative: SEVERE difficulty breathing (e.g., struggling for each breath, speaks in single words)    Negative: Rapid onset of cough and has hives    Negative: Coughing started suddenly after medicine, an allergic food or bee sting    Negative: Difficulty breathing after exposure to flames, smoke, or fumes    Negative: Sounds like a life-threatening emergency to the triager    Negative: Previous asthma attacks and this feels like asthma attack    Negative: Dry cough (non-productive; no sputum or minimal clear sputum) and within 14 days of COVID-19 Exposure    Negative: MODERATE difficulty breathing (e.g., speaks in phrases, SOB even at rest, pulse 100-120) and still present when not coughing    Negative: Chest pain present when not coughing    Negative: Passed out (i.e., fainted, collapsed and was not responding)    Negative: Patient sounds very sick or weak to the triager    Negative: MILD difficulty breathing (e.g., minimal/no SOB at rest, SOB with walking, pulse <100) and still present when not coughing    Negative: Coughed up > 1 tablespoon (15 ml) blood (Exception: Blood-tinged sputum.)    Negative:  Fever > 103 F (39.4 C)    Negative: Fever > 101 F (38.3 C) and over 60 years of age    Negative: Fever > 100.0 F (37.8 C) and has diabetes mellitus or a weak immune system (e.g., HIV positive, cancer chemotherapy, organ transplant, splenectomy, chronic steroids)    Negative: Fever > 100.0 F (37.8 C) and bedridden (e.g., nursing home patient, stroke, chronic illness, recovering from surgery)    Negative: Increasing ankle swelling    Negative: Wheezing is present    Negative: SEVERE coughing spells (e.g., whooping sound after coughing, vomiting after coughing)    Negative: Coughing up dolores-colored (reddish-brown) or blood-tinged sputum    Negative: Fever present > 3 days (72 hours)    Negative: Fever returns after gone for over 24 hours and symptoms worse or not improved    Negative: Using nasal washes and pain medicine > 24 hours and sinus pain persists    Negative: Known COPD or other severe lung disease (i.e., bronchiectasis, cystic fibrosis, lung surgery) and worsening symptoms (i.e., increased sputum purulence or amount, increased breathing difficulty)    Protocols used: COUGH-A-OH      Miguel Javed RN, BSN, PHN  [facility]

## 2022-11-19 ENCOUNTER — NURSE TRIAGE (OUTPATIENT)
Dept: NURSING | Facility: CLINIC | Age: 42
End: 2022-11-19

## 2022-11-19 LAB — GROUP A STREP BY PCR: NOT DETECTED

## 2022-11-19 NOTE — PROGRESS NOTES
Assessment & Plan     Fever in adult  - Influenza A & B Antigen - Clinic Collect    Acute cough  - Symptomatic; Unknown COVID-19 Virus (Coronavirus) by PCR Nose    Acute pharyngitis, unspecified etiology  - Streptococcus A Rapid Screen w/Reflex to PCR - Clinic Collect  - Group A Streptococcus PCR Throat Swab    Viral URI with cough  - guaiFENesin-codeine (ROBITUSSIN AC) 100-10 MG/5ML solution; Take 5-10 mLs by mouth every 4 hours as needed for cough  - benzonatate (TESSALON) 100 MG capsule; Take 1-2 capsules by mouth up to 3 times daily as needed for cough.    Symptoms suspicious for COVID infection.  Influenza and strep negative, COVID pending.  Symptomatic cough treatments discussed.  Fluids, honey, tea.    Return in about 1 week (around 11/25/2022) for visit with primary care provider if not improving.     Pao Duran PA-C  [facility]    Humberto Sprague is a 42 year old who presents for the following health issues     Patient presents with:  Cough: Sx Wednesday   Took Covid test last week.  Chest Pain  Fever: Sx wednesday   General pain  Watery Eyes Od  Dizziness  Throat Problem    PERLA Swain presents to clinic today for evaluation of URI symptoms.  Symptoms first began 5 days ago with a sore throat.  Couple days later she developed a cough that feels it is coming from a tickle on the left side of her throat that she is not able to clear.  When she coughs she has pain in the center of her chest.  Now she also has a subjective fever, body aches, dizziness or brain fog and a headache.  She does feel little short of breath occasionally.  She is also been sneezing quite a bit.  She was exposed to COVID by some family members and tested negative at home last week.    Review of Systems   ROS negative except as stated above.      Objective    /76   Pulse 99   Temp (!) 100.5  F (38.1  C) (Tympanic)   Resp 20   Wt 69.9 kg (154 lb)   SpO2 100%   BMI 27.28 kg/m     Physical Exam   GENERAL: healthy, alert and no distress  EYES: Eyes grossly normal to inspection, PERRL and conjunctivae and sclerae normal  HENT: ear canals and TM's normal, nose and mouth without ulcers or lesions  NECK: no adenopathy, no asymmetry, masses, or scars and thyroid normal to palpation  RESP: lungs clear to auscultation - no rales, rhonchi or wheezes  CV: regular rate and rhythm, normal S1 S2, no S3 or S4, no murmur, click or rub, no peripheral edema and peripheral pulses strong  SKIN: no suspicious lesions or rashes    Results for orders placed or performed in visit on 11/18/22   Influenza A & B Antigen - Clinic Collect     Status: Normal    Specimen: Nose; Swab   Result Value Ref Range    Influenza A antigen Negative Negative    Influenza B antigen Negative Negative    Narrative    Test results must be correlated with clinical data. If necessary, results should be confirmed by a molecular assay or viral culture.   Streptococcus A Rapid Screen w/Reflex to PCR - Clinic Collect     Status: Normal    Specimen: Throat; Swab   Result Value Ref Range    Group A Strep antigen Negative Negative

## 2022-11-19 NOTE — TELEPHONE ENCOUNTER
Carmen () calls and says that he wants to know the results of his wife's tests from yesterday. Caller says that his wife went to an  clinic yesterday and had a flu test done , a covid test done, and a strep test done. RN then checked Epic and told caller the results that are back and  voiced understanding.  will call back for the lab tests that are still in process. COVID 19 Nurse Triage Plan/Patient Instructions    Please be aware that novel coronavirus (COVID-19) may be circulating in the community. If you develop symptoms such as fever, cough, or SOB or if you have concerns about the presence of another infection including coronavirus (COVID-19), please contact your health care provider or visit [email or web address].     Disposition/Instructions    Home care recommended. Follow home care protocol based instructions.    Thank you for taking steps to prevent the spread of this virus.  o Limit your contact with others.  o Wear a simple mask to cover your cough.  o Wash your hands well and often.    Resources    [facility]: About COVID-19: [email or web address]    CDC: What to Do If You're Sick: www.cdc.gov/coronavirus/2019-ncov/about/steps-when-sick.html    CDC: Ending Home Isolation: www.cdc.gov/coronavirus/2019-ncov/hcp/disposition-in-home-patients.html     CDC: Caring for Someone: www.cdc.gov/coronavirus/2019-ncov/if-you-are-sick/care-for-someone.html     [facility]: Interim Guidance for Hospital Discharge to Home: www.health.[facility].mn.us/diseases/coronavirus/hcp/hospdischarge.pdf    [facility] clinical trials (COVID-19 research studies): [email or web address].[email or web address]/[facility]-clinical-trials     Below are the COVID-19 hotlines at the [place] Department of [facility] ([facility]). Interpreters are available.   o For health questions: Call 240-456-3232 or 1-742.181.3308 (7 a.m. to 7 p.m.)  o For questions about schools and childcare: Call 016-132-0112 or 1-642.303.5914 (7 a.m. to  7 p.m.)                   Reason for Disposition    [1] Follow-up call to recent contact AND [2] information only call, no triage required    Additional Information    Negative: [1] Caller is not with the adult (patient) AND [2] reporting urgent symptoms    Negative: Lab result questions    Negative: Medication questions    Negative: Caller can't be reached by phone    Negative: Caller has already spoken to PCP or another triager    Negative: RN needs further essential information from caller in order to complete triage    Negative: Requesting regular office appointment    Negative: [1] Caller requesting NON-URGENT health information AND [2] PCP's office is the best resource    Negative: Health Information question, no triage required and triager able to answer question    Negative: General information question, no triage required and triager able to answer question    Negative: Question about upcoming scheduled test, no triage required and triager able to answer question    Negative: [1] Caller is not with the adult (patient) AND [2] probable NON-URGENT symptoms    Protocols used: INFORMATION ONLY CALL - NO TRIAGE-A-

## 2022-11-19 NOTE — PATIENT INSTRUCTIONS
Robitussin with codeine cough syrupas needed for cough before sleep, up to every 4-6 hours. DO NOT take before driving a vehicle.  Tessalon Perles- take 1-2 capsules 3 times daily as needed for cough.    Rest! Your body needs more rest to heal.  Drink plenty of fluids (warm fluids like tea or soup are soothing and reduce cough)  Sit in the bathroom with a hot shower running and breathe in the steam.  Honey may soothe your sore throat and help manage your cough- may take straight or in warm water with lemon juice.  Avoid smoke (cigarettes, bonfires, fireplace, wood burning stoves).  Take Tylenol or an NSAID such as ibuprofen or naproxen as needed for pain.  Delsym (dextromethorphan polistirex) is an over the counter cough medication that lasts 12 hours.   Mucinex or Robitussin (guiafenesin) thin mucus and may help it to loosen more quickly  Good handwashing is the best way to prevent spread of germs  Present to emergency room if you develop trouble breathing, swallowing or cough-up blood.  Follow up with your primary care provider if symptoms worsen or fail to improve as expected.

## 2022-11-20 ENCOUNTER — NURSE TRIAGE (OUTPATIENT)
Dept: NURSING | Facility: CLINIC | Age: 42
End: 2022-11-20

## 2022-11-20 LAB — SARS-COV-2 RNA RESP QL NAA+PROBE: NEGATIVE

## 2022-11-20 NOTE — TELEPHONE ENCOUNTER
Pt calling for test results - COVID test is still in process     Component Ref Range & Units 2 d ago      Influenza A antigen Negative Negative     Influenza B antigen Negative Negative      Component Ref Range & Units 2 d ago 6 yr ago 9 yr ago 11 yr ago    Group A Strep antigen Negative Negative         Component Ref Range & Units 2 d ago     Group A strep by PCR Not Detected Not Detected      No triage     Tiny Edge RN  [place] Nurse Advisor  10:13 AM 11/20/2022      Reason for Disposition    Health Information question, no triage required and triager able to answer question    Additional Information    Negative: [1] Caller is not with the adult (patient) AND [2] reporting urgent symptoms    Negative: Lab result questions    Negative: Medication questions    Negative: Caller can't be reached by phone    Negative: Caller has already spoken to PCP or another triager    Negative: Requesting regular office appointment    Negative: [1] Caller requesting NON-URGENT health information AND [2] PCP's office is the best resource    Negative: RN needs further essential information from caller in order to complete triage    Protocols used: INFORMATION ONLY CALL - NO TRIAGE-[facility]

## 2022-12-23 ENCOUNTER — PRE VISIT (OUTPATIENT)
Dept: UROLOGY | Facility: CLINIC | Age: 42
End: 2022-12-23

## 2022-12-23 NOTE — CONFIDENTIAL NOTE
Reason for visit: annual follow-up     Relevant information: left renal mass, right renal mass    Records/imaging/labs/orders: imaging and labs scheduled same day prior to visit    At Rooming: guanakito Tamez  12/23/2022  9:17 AM

## 2023-01-13 ENCOUNTER — TELEPHONE (OUTPATIENT)
Dept: UROLOGY | Facility: CLINIC | Age: 43
End: 2023-01-13

## 2023-01-13 NOTE — TELEPHONE ENCOUNTER
Spoke to pt. Informed pt that orders for CT, CBC, and CMP have been extended until end of March. Provided phone numbers to schedule and pt will call clinic back to schedule follow up with Dr. Scott.     Lorna Armstrong, MSN RN

## 2023-01-13 NOTE — TELEPHONE ENCOUNTER
[facility] Call Center    Phone Message    May a detailed message be left on voicemail: yes     Reason for Call: Patient called to get orders for labs and a CT so she can see Dr. Scott for a f/u. Patient needs an appointment with Dr. Scott but wants to wait until she has orders in her chart so she can schedule all three appointments at the same time. Please contact patient in regards to this message. Thank you      Action Taken: Message routed to:  [facility] ([facility] Urology    Travel Screening: Not Applicable

## 2023-01-24 ENCOUNTER — LAB (OUTPATIENT)
Dept: LAB | Facility: CLINIC | Age: 43
End: 2023-01-24
Payer: COMMERCIAL

## 2023-01-24 ENCOUNTER — ANCILLARY PROCEDURE (OUTPATIENT)
Dept: CT IMAGING | Facility: CLINIC | Age: 43
End: 2023-01-24
Attending: UROLOGY
Payer: COMMERCIAL

## 2023-01-24 DIAGNOSIS — N28.89 LEFT RENAL MASS: ICD-10-CM

## 2023-01-24 LAB
ALBUMIN SERPL BCG-MCNC: 3.9 G/DL (ref 3.5–5.2)
ALP SERPL-CCNC: 50 U/L (ref 35–104)
ALT SERPL W P-5'-P-CCNC: 11 U/L (ref 10–35)
ANION GAP SERPL CALCULATED.3IONS-SCNC: 6 MMOL/L (ref 7–15)
AST SERPL W P-5'-P-CCNC: 20 U/L (ref 10–35)
BILIRUB SERPL-MCNC: 0.6 MG/DL
BUN SERPL-MCNC: 8 MG/DL (ref 6–20)
CALCIUM SERPL-MCNC: 9.1 MG/DL (ref 8.6–10)
CHLORIDE SERPL-SCNC: 109 MMOL/L (ref 98–107)
CREAT SERPL-MCNC: 0.59 MG/DL (ref 0.51–0.95)
DEPRECATED HCO3 PLAS-SCNC: 25 MMOL/L (ref 22–29)
ERYTHROCYTE [DISTWIDTH] IN BLOOD BY AUTOMATED COUNT: 12.9 % (ref 10–15)
GFR SERPL CREATININE-BSD FRML MDRD: >90 ML/MIN/1.73M2
GLUCOSE SERPL-MCNC: 98 MG/DL (ref 70–99)
HCT VFR BLD AUTO: 41.1 % (ref 35–47)
HGB BLD-MCNC: 13.9 G/DL (ref 11.7–15.7)
MCH RBC QN AUTO: 31.1 PG (ref 26.5–33)
MCHC RBC AUTO-ENTMCNC: 33.8 G/DL (ref 31.5–36.5)
MCV RBC AUTO: 92 FL (ref 78–100)
PLATELET # BLD AUTO: 197 10E3/UL (ref 150–450)
POTASSIUM SERPL-SCNC: 4.4 MMOL/L (ref 3.4–5.3)
PROT SERPL-MCNC: 6.5 G/DL (ref 6.4–8.3)
RBC # BLD AUTO: 4.47 10E6/UL (ref 3.8–5.2)
SODIUM SERPL-SCNC: 140 MMOL/L (ref 136–145)
WBC # BLD AUTO: 2 10E3/UL (ref 4–11)

## 2023-01-24 PROCEDURE — 36415 COLL VENOUS BLD VENIPUNCTURE: CPT | Performed by: PATHOLOGY

## 2023-01-24 PROCEDURE — 74178 CT ABD&PLV WO CNTR FLWD CNTR: CPT | Performed by: RADIOLOGY

## 2023-01-24 PROCEDURE — 71260 CT THORAX DX C+: CPT | Performed by: RADIOLOGY

## 2023-01-24 PROCEDURE — 85027 COMPLETE CBC AUTOMATED: CPT | Performed by: PATHOLOGY

## 2023-01-24 PROCEDURE — 80053 COMPREHEN METABOLIC PANEL: CPT | Performed by: PATHOLOGY

## 2023-01-24 RX ORDER — IOPAMIDOL 755 MG/ML
86 INJECTION, SOLUTION INTRAVASCULAR ONCE
Status: COMPLETED | OUTPATIENT
Start: 2023-01-24 | End: 2023-01-24

## 2023-01-24 RX ADMIN — IOPAMIDOL 86 ML: 755 INJECTION, SOLUTION INTRAVASCULAR at 12:16

## 2023-01-26 ENCOUNTER — TELEPHONE (OUTPATIENT)
Dept: UROLOGY | Facility: CLINIC | Age: 43
End: 2023-01-26
Payer: COMMERCIAL

## 2023-01-26 NOTE — TELEPHONE ENCOUNTER
Contacted patient to inform her of Dr. Scott's message. Patient is concerned to not have a follow up that is not past the 5 year jayne. She will keep her March appointment to discuss.  Opal Story LPN  Urology Clinic Service   [facility]

## 2023-01-26 NOTE — RESULT ENCOUNTER NOTE
Contacted patient to inform her of Dr. Scott's message. Patient is concerned to not have a follow up that is not past the 5 year jayne. She will keep her March appointment to discuss.  Opal Story LPN  Urology Clinic Service   [facility]

## 2023-01-26 NOTE — TELEPHONE ENCOUNTER
----- Message from Abel Scott MD sent at 1/25/2023  5:48 PM CST -----  Team - please call the patient with the normal results. She does not need any routine scheduled follow ups.     Thanks

## 2023-03-22 ENCOUNTER — PRE VISIT (OUTPATIENT)
Dept: UROLOGY | Facility: CLINIC | Age: 43
End: 2023-03-22
Payer: COMMERCIAL

## 2023-03-22 NOTE — CONFIDENTIAL NOTE
Reason for visit: follow-up per pt - review telephone encounter on 1/26/2023 for further detail    Relevant information: left and right renal mass    Records/imaging/labs/orders: in epic    At Rooming: guanakito Tamez  3/22/2023  9:06 AM

## 2023-03-29 ENCOUNTER — OFFICE VISIT (OUTPATIENT)
Dept: UROLOGY | Facility: CLINIC | Age: 43
End: 2023-03-29
Payer: COMMERCIAL

## 2023-03-29 DIAGNOSIS — C64.2 MALIGNANT NEOPLASM OF KIDNEY EXCLUDING RENAL PELVIS, LEFT (H): Primary | ICD-10-CM

## 2023-03-29 PROCEDURE — 99214 OFFICE O/P EST MOD 30 MIN: CPT | Performed by: UROLOGY

## 2023-03-29 ASSESSMENT — PAIN SCALES - GENERAL: PAINLEVEL: NO PAIN (0)

## 2023-03-29 NOTE — NURSING NOTE
Chief Complaint   Patient presents with     Follow Up       not currently breastfeeding. There is no height or weight on file to calculate BMI.    Patient Active Problem List   Diagnosis     CARDIOVASCULAR SCREENING; LDL GOAL LESS THAN 160     Cervical polyp     Impingement syndrome of right shoulder     Anal fissure     IUD contraception       No Known Allergies    Current Outpatient Medications   Medication Sig Dispense Refill     acetaminophen (TYLENOL) 325 MG tablet Take 325-650 mg by mouth every 6 hours as needed for mild pain (Patient not taking: Reported on 11/18/2022)       albuterol (PROAIR HFA/PROVENTIL HFA/VENTOLIN HFA) 108 (90 Base) MCG/ACT inhaler Inhale 1-2 puffs into the lungs every 4 hours as needed for shortness of breath / dyspnea or wheezing (Patient not taking: Reported on 5/2/2022) 6.7 g 0     ANUCORT-HC 25 MG suppository Place 1 suppository (25 mg) rectally 2 times daily (Patient not taking: Reported on 12/29/2021) 24 suppository 0     benzonatate (TESSALON) 100 MG capsule Take 1-2 capsules by mouth up to 3 times daily as needed for cough. 30 capsule 0     benzonatate (TESSALON) 100 MG capsule Take 1 capsule (100 mg) by mouth 3 times daily as needed for cough (Patient not taking: Reported on 5/2/2022) 25 capsule 0     dextromethorphan-guaiFENesin (MUCINEX DM)  MG 12 hr tablet Take 1 tablet by mouth every 12 hours       guaiFENesin-codeine (ROBITUSSIN AC) 100-10 MG/5ML solution Take 5-10 mLs by mouth every 4 hours as needed for cough 118 mL 0     ibuprofen (ADVIL/MOTRIN) 200 MG capsule Take 200 mg by mouth every 4 hours as needed for fever (Patient not taking: Reported on 5/2/2022)       levonorgestrel (MIRENA) 20 MCG/24HR IUD 1 each by Intrauterine route once       promethazine-DM (PHENERGAN-DM) 6.25-15 MG/5ML syrup Take 5 mLs by mouth every 4 hours as needed for cough (Patient not taking: Reported on 5/2/2022) 118 mL 0       Social History     Tobacco Use     Smoking status: Never      Smokeless tobacco: Never   Vaping Use     Vaping Use: Never used   Substance Use Topics     Alcohol use: No     Drug use: No       Jaclyn Krfat  3/29/2023  4:20 PM

## 2023-03-29 NOTE — PATIENT INSTRUCTIONS
Please schedule a renal US in 1 year and a follow up with PAPI Lou after    It was a pleasure meeting with you today.  Thank you for allowing me and my team the privilege of caring for you today.  YOU are the reason we are here, and I truly hope we provided you with the excellent service you deserve.  Please let us know if there is anything else we can do for you so that we can be sure you are leaving completely satisfied with your care experience.

## 2023-04-01 NOTE — PROGRESS NOTES
Urology Clinic     [facility]  Brynn Sprague is a 42 year old female with history of kidney cancer status post left partial nephrectomy, here for follow-up.      The patient denies any flank or abdominal pain, hematuria or recurrent infection. She initially presented with shortness of breath as the presenting symptom which led to the work up and detection of the renal mass and therefore she is skeptical of the common clinical symptoms to detect any recurrence in her kidney.     No changes to health, hospitalizations or new diagnoses in the interim    PHYSICAL EXAM  There were no vitals taken for this visit.   Constitutional: AO, pleasant, NAD  Resp: Non-labored breathing on room air  Abd: Soft, NT, ND  Ext WWP     Labs  Lab Results   Component Value Date    WBC 2.0 01/24/2023    WBC 2.7 09/24/2020     Lab Results   Component Value Date    RBC 4.47 01/24/2023    RBC 4.50 09/24/2020     Lab Results   Component Value Date    HGB 13.9 01/24/2023    HGB 14.3 09/24/2020     Lab Results   Component Value Date    HCT 41.1 01/24/2023    HCT 41.4 09/24/2020     No components found for: MCT  Lab Results   Component Value Date    MCV 92 01/24/2023    MCV 92 09/24/2020     Lab Results   Component Value Date    MCH 31.1 01/24/2023    MCH 31.8 09/24/2020     Lab Results   Component Value Date    MCHC 33.8 01/24/2023    MCHC 34.5 09/24/2020     Lab Results   Component Value Date    RDW 12.9 01/24/2023    RDW 12.2 09/24/2020     Lab Results   Component Value Date     01/24/2023     09/24/2020        Last Comprehensive Metabolic Panel:  Sodium   Date Value Ref Range Status   01/24/2023 140 136 - 145 mmol/L Final   12/11/2020 143 133 - 144 mmol/L Final     Potassium   Date Value Ref Range Status   01/24/2023 4.4 3.4 - 5.3 mmol/L Final   05/04/2022 4.6 3.4 - 5.3 mmol/L Final   12/11/2020 3.8 3.4 - 5.3 mmol/L Final     Chloride   Date Value Ref Range Status   01/24/2023 109 (H) 98 - 107 mmol/L Final   05/04/2022 110 (H) 94 -  109 mmol/L Final   12/11/2020 108 94 - 109 mmol/L Final     Carbon Dioxide   Date Value Ref Range Status   12/11/2020 30 20 - 32 mmol/L Final     Carbon Dioxide (CO2)   Date Value Ref Range Status   01/24/2023 25 22 - 29 mmol/L Final   05/04/2022 24 20 - 32 mmol/L Final     Anion Gap   Date Value Ref Range Status   01/24/2023 6 (L) 7 - 15 mmol/L Final   05/04/2022 5 3 - 14 mmol/L Final   12/11/2020 5 3 - 14 mmol/L Final     Glucose   Date Value Ref Range Status   01/24/2023 98 70 - 99 mg/dL Final   05/04/2022 99 70 - 99 mg/dL Final   12/11/2020 79 70 - 99 mg/dL Final     Urea Nitrogen   Date Value Ref Range Status   01/24/2023 8.0 6.0 - 20.0 mg/dL Final   05/04/2022 6 (L) 7 - 30 mg/dL Final   12/11/2020 8 7 - 30 mg/dL Final     Creatinine   Date Value Ref Range Status   01/24/2023 0.59 0.51 - 0.95 mg/dL Final   12/11/2020 0.63 0.52 - 1.04 mg/dL Final     GFR Estimate   Date Value Ref Range Status   01/24/2023 >90 >60 mL/min/1.73m2 Final     Comment:     eGFR calculated using 2021 CKD-EPI equation.   12/11/2020 >90 >60 mL/min/[1.73_m2] Final     GFR, ESTIMATED POCT   Date Value Ref Range Status   12/29/2021 >60 >60 mL/min/1.73m2 Final     Calcium   Date Value Ref Range Status   01/24/2023 9.1 8.6 - 10.0 mg/dL Final   12/11/2020 9.0 8.5 - 10.1 mg/dL Final     Bilirubin Total   Date Value Ref Range Status   01/24/2023 0.6 <=1.2 mg/dL Final   12/16/2016 0.7 0.2 - 1.3 mg/dL Final     Alkaline Phosphatase   Date Value Ref Range Status   01/24/2023 50 35 - 104 U/L Final   12/16/2016 40 40 - 150 U/L Final     ALT   Date Value Ref Range Status   01/24/2023 11 10 - 35 U/L Final   12/16/2016 20 0 - 50 U/L Final     AST   Date Value Ref Range Status   01/24/2023 20 10 - 35 U/L Final   12/16/2016 14 0 - 45 U/L Final       No results found for: PSA       Imaging   No evidence of local or distant recurrence     CT CAP 1/24/2023    IMPRESSION:  1.  No recurrent or metastatic disease in the chest, abdomen and pelvis. Stable  postoperative changes of partial left nephrectomy.    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 RIGOBERTO. Patient is adamant to continue with imaging surveillance given her initial presentation with atypical symptoms      Plan   Follow-up in 1 yr with renal US     30 total minutes spent on the date of the encounter including direct interaction with the patient, performing chart review, documentation and further activities as noted above    Abel Scott MD   Department of Urology   [facility]

## 2023-05-08 ENCOUNTER — PATIENT OUTREACH (OUTPATIENT)
Dept: CARE COORDINATION | Facility: CLINIC | Age: 43
End: 2023-05-08
Payer: COMMERCIAL

## 2023-05-22 ENCOUNTER — PATIENT OUTREACH (OUTPATIENT)
Dept: CARE COORDINATION | Facility: CLINIC | Age: 43
End: 2023-05-22
Payer: COMMERCIAL

## 2023-06-26 ENCOUNTER — TELEPHONE (OUTPATIENT)
Dept: FAMILY MEDICINE | Facility: CLINIC | Age: 43
End: 2023-06-26
Payer: COMMERCIAL

## 2023-06-26 NOTE — TELEPHONE ENCOUNTER
Prior Authorization Retail Medication Request    Medication/Dose: ANUCORT-HC 25 MG suppository  ICD code (if different than what is on RX):  K64.4  Previously Tried and Failed:  na  Rationale:  na    Insurance Name:  [employer]  Insurance ID:  [id number]      Pharmacy Information (if different than what is on RX)  Name:  [facility]   Phone:  [phone]

## 2023-06-28 NOTE — TELEPHONE ENCOUNTER
Central Prior Authorization Team   Phone: [phone]      PA Initiation    Medication: HYDROCORTISONE ACETATE 25 MG RE SUPP  Insurance Company: [employer] - Phone [phone] Fax [phone]  Pharmacy Filling the Rx: [id number] - [place], [place] - [address] [facility] [address]  Filling Pharmacy Phone: [phone]  Filling Pharmacy Fax:    Start Date: 6/28/2023

## 2023-06-28 NOTE — TELEPHONE ENCOUNTER
Prior Authorization Not Needed per Insurance    Medication: HYDROCORTISONE ACETATE 25 MG RE SUPP  Insurance Company: [employer] - Phone [phone] Fax [phone]  Expected CoPay:      Pharmacy Filling the Rx: [id number] - [place], [place] - [address] [facility] [address]  Pharmacy Notified: Yes - verified pharmacy received paid claim  Patient Notified: Yes (pharmacy states patient will receive a notification when it as ready - they will be ordering it in for tomorrow)

## 2023-07-10 ENCOUNTER — OFFICE VISIT (OUTPATIENT)
Dept: FAMILY MEDICINE | Facility: CLINIC | Age: 43
End: 2023-07-10
Payer: OTHER MISCELLANEOUS

## 2023-07-10 VITALS
HEIGHT: 62 IN | OXYGEN SATURATION: 100 % | BODY MASS INDEX: 26.54 KG/M2 | TEMPERATURE: 98 F | WEIGHT: 144.2 LBS | RESPIRATION RATE: 14 BRPM | HEART RATE: 81 BPM | SYSTOLIC BLOOD PRESSURE: 104 MMHG | DIASTOLIC BLOOD PRESSURE: 62 MMHG

## 2023-07-10 DIAGNOSIS — M54.50 ACUTE BILATERAL LOW BACK PAIN WITHOUT SCIATICA: Primary | ICD-10-CM

## 2023-07-10 PROCEDURE — 99213 OFFICE O/P EST LOW 20 MIN: CPT | Performed by: PHYSICIAN ASSISTANT

## 2023-07-10 RX ORDER — CYCLOBENZAPRINE HCL 10 MG
5-10 TABLET ORAL 3 TIMES DAILY PRN
Qty: 25 TABLET | Refills: 0 | Status: SHIPPED | OUTPATIENT
Start: 2023-07-10 | End: 2023-12-06

## 2023-07-10 ASSESSMENT — PAIN SCALES - GENERAL: PAINLEVEL: EXTREME PAIN (9)

## 2023-07-10 NOTE — PATIENT INSTRUCTIONS
Crys Swain,    Thank you for allowing [facility] to manage your care.    I am unsure of the cause of your symptoms, but your exam is reassuring. This is likely a back strain or sprain.    If you develop worsening/changing symptoms at any time, please call 911 or go to the emergency department for evaluation.    I sent your prescriptions to your pharmacy.    For your pain, please use Tylenol 650mg every 6 hours. You may use 400mg of ibuprofen between doses of Tylenol.     Max acetaminophen (Tylenol) 4,000mg/24 hours  Max ibuprofen 3,000mg/24 hours    For severe pain not controlled by over the counter medications, please use cyclobenzaprine as prescribed. Do not use this medication while driving, operating machinery, with other sedating medications, or while drinking alcohol as it will make you drowsy.    I made a referral to physical therapy. They will be calling in approximately 1 week to set up your appointment.  If you do not hear from them, please call the specialty number on your after visit summary.     If you have any questions or concerns, please feel free to call us at [phone]    Sincerely,    Arsenio Mason PA-C    Did you know?      You can schedule a video visit for follow-up appointments as well as future appointments for certain conditions.  Please see the below link.     [email or web address]    If you have not already done so,  I encourage you to sign up for [facility] ([email or web address]).  This will allow you to review your results, securely communicate with a provider, and schedule virtual visits as well.

## 2023-07-10 NOTE — PROGRESS NOTES
Assessment & Plan   Problem List Items Addressed This Visit        Nervous and Auditory    Acute bilateral low back pain without sciatica - Primary    Relevant Medications    cyclobenzaprine (FLEXERIL) 10 MG tablet    Other Relevant Orders    Physical Therapy Referral      I do not believe a fracture to be the source of this patient's pain as there was no preceding significant trauma.  Based on this, no imaging of the spine was done.    I do not believe the pain is caused by an epidural abscess as the patient denies hx of IV drug use and does not have fevers/chills and no recent procedures.    I do not believe this patient's pain is from an infiltrative vertebral tumor as the patient does not have weight loss or night sweats and no known history of cancer.    I do not believe this patient's pain represents a rupture AAA.  There is no palpable, pulsatile mass on exam and patient does not have any ABD pain.      Based on history and exam, the most likely etiology of this patient's back pain is lumbosacral stain, less likely radiculopathy.  Emergent MRI is not indicated as this patient does not have new weakness or cauda equina syndrome.  Patient has no saddle anesthesia, bowel or bladder incontinence. Will send home with otc analgesics,  muscle relaxant for breakthrough pain/spasm, rice/heat, andphysical therapy referral.  Follow-up in 1 month if not improving.    Complete history and physical exam as below. Afebrile with normal vital signs.    DDx and Dx discussed with and explained to the pt to their satisfaction.  All questions were answered at this time. Pt expressed understanding of and agreement with this dx, tx, and plan. No further workup warranted and standard medication warnings given. I have given the patient a list of pertinent indications for re-evaluation. Will go to the Emergency Department if symptoms worsen or new concerning symptoms arise. Patient left in no apparent distress.     Prescription drug  "management  21 minutes spent by me on the date of the encounter doing chart review, history and exam, documentation and further activities per the note     BMI:   Estimated body mass index is 26.47 kg/m  as calculated from the following:    Height as of this encounter: 1.572 m (5' 1.89\").    Weight as of this encounter: 65.4 kg (144 lb 3.2 oz).     See Patient Instructions    PAPI Raya [facility] [place] Swain is a 42 year old, presenting for the following health issues:  Musculoskeletal Problem        7/10/2023     4:39 PM   Additional Questions   Roomed by yuni silvestre   Accompanied by          7/10/2023     4:39 PM   Patient Reported Additional Medications   Patient reports taking the following new medications none     HPI     Pain History:  When did you first notice your pain? 7/10/23   Have you seen anyone else for your pain? No  How has your pain affected your ability to work? Hurt her back at work. Came home after.   Where in your body do you have pain? Back Pain  Onset/Duration: 7/10/23  Description:   Location of pain: low back  Character of pain: sharp  Pain radiation: none  New numbness or weakness in legs, not attributed to pain: no   Intensity: Currently 9/10  Progression of Symptoms: worsening and intermittent  History:   Specific cause: when she bent to grab a bucket  Pain interferes with job: YES  History of back problems: Yes, a long time ago. Maybe 18 years ago  Any previous MRI or X-rays: Yes--at some clinic in [facility].  Date don't know  Sees a specialist for back pain: No  Alleviating factors:   Improved by: sleeping on her sides    Precipitating factors:  Worsened by: walking, stairs, lifting up leg, bending  Therapies tried and outcome: Ibu, hot towel    Accompanying Signs & Symptoms:  Risk of Fracture: None  Risk of Cauda Equina: None  Risk of Infection: None  Risk of Cancer: None  Pain with lifting a bucket of water this morning. Immediately had low " "right back pain. Worse with standing up straight. No numbness/tingling or incontinence. ibu helps pain.     Review of Systems   Constitutional, HEENT, cardiovascular, pulmonary, gi and gu systems are negative, except as otherwise noted.      Objective    /62   Pulse 81   Temp 98  F (36.7  C) (Tympanic)   Resp 14   Ht 1.572 m (5' 1.89\")   Wt 65.4 kg (144 lb 3.2 oz)   LMP 06/11/2023 (Approximate)   SpO2 100%   Breastfeeding No   BMI 26.47 kg/m    Body mass index is 26.47 kg/m .  Physical Exam  Vitals and nursing note reviewed.   Constitutional:       General: She is not in acute distress.     Appearance: She is not ill-appearing or diaphoretic.   HENT:      Head: Normocephalic and atraumatic.      Mouth/Throat:      Mouth: Mucous membranes are moist.   Eyes:      Conjunctiva/sclera: Conjunctivae normal.   Cardiovascular:      Rate and Rhythm: Normal rate and regular rhythm.      Heart sounds: Normal heart sounds. No murmur heard.     No friction rub. No gallop.   Pulmonary:      Effort: Pulmonary effort is normal. No respiratory distress.      Breath sounds: Normal breath sounds. No stridor. No wheezing, rhonchi or rales.   Abdominal:      General: Bowel sounds are normal. There is no distension.      Palpations: Abdomen is soft. There is no mass.      Tenderness: There is no abdominal tenderness. There is no guarding or rebound.      Hernia: No hernia is present.   Musculoskeletal:      Comments: No CVA or midline spinal tenderness. Paraspinal muscles in lumbar region. No overlying signs of trauma or infection.   Skin:     General: Skin is warm and dry.   Neurological:      General: No focal deficit present.      Mental Status: She is alert. Mental status is at baseline.      Comments: Able to toe lift, heel walk and knee bend.       Psychiatric:         Mood and Affect: Mood normal.         Behavior: Behavior normal.                    "

## 2023-07-10 NOTE — LETTER
July 10, 2023      Brynn Sprague  [address]  [place] [address]        To Whom It May Concern:    Brynn Sprague  was seen on 7/10/23.  Please excuse her until 7/17/23 due to injury.        Sincerely,        Tristen Mason PA

## 2023-07-12 ENCOUNTER — THERAPY VISIT (OUTPATIENT)
Dept: PHYSICAL THERAPY | Facility: CLINIC | Age: 43
End: 2023-07-12
Attending: PHYSICIAN ASSISTANT
Payer: OTHER MISCELLANEOUS

## 2023-07-12 DIAGNOSIS — M54.50 ACUTE BILATERAL LOW BACK PAIN WITHOUT SCIATICA: ICD-10-CM

## 2023-07-12 PROCEDURE — 97530 THERAPEUTIC ACTIVITIES: CPT | Mod: GP | Performed by: PHYSICAL THERAPIST

## 2023-07-12 PROCEDURE — 97161 PT EVAL LOW COMPLEX 20 MIN: CPT | Mod: GP | Performed by: PHYSICAL THERAPIST

## 2023-07-12 PROCEDURE — 97110 THERAPEUTIC EXERCISES: CPT | Mod: GP | Performed by: PHYSICAL THERAPIST

## 2023-07-12 NOTE — PROGRESS NOTES
PHYSICAL THERAPY EVALUATION  Type of Visit: Evaluation    See electronic medical record for Abuse and Falls Screening details.    Subjective       Presenting condition or subjective complaint:   Patient reports acute onset of bilateral low back pain after bending over to reach toward a trash can on 7/10/23. She felt a pain right away. Pain has been constant and severe since then. It is getting slightly better. She was barely able to walk at all at first.  Date of onset: 07/10/23    PAIN: Pain Level at Rest: 9/10  Pain Level with Use: 9/10  Pain Location: lumbar spine  Pain Quality: Sharp  Pain Frequency: intermittent  Pain is Worst: morning and evening  Pain is Exacerbated By: walking, stairs, lifting up leg, bending, trying to stand up straight  Pain is Relieved By: Ibuprofen, hot towel  Pain Progression: Worsened  Hx of 1 episode of back pain approximately 18 years ago  Relevant medical history:   none reported  Dates & types of surgery:  none reported    Prior diagnostic imaging/testing results:     none  Prior therapy history for the same diagnosis, illness or injury:    none    Prior Level of Function   Transfers:   Ambulation:   ADL:   IADL:     Living Environment  Social support:    Lives with significant other  Type of home:   house  Stairs to enter the home:       yes  Ramp:   no  Stairs inside the home:         Help at home:  none  Equipment owned:   none    Employment:    facility management/janitorial - reaching, bending, lifting, twisting, carrying vacuum, lifting   Hobbies/Interests:  none reported    Patient goals for therapy:  get back to work, be able to sit and stand and walk       Objective   LUMBAR SPINE EVALUATION  POSTURE: sits slouched, visibily uncomfortable/shifting often  GAIT:   Weightbearing Status: WBAT  Assistive Device(s): None   Gait Deviations:  decreased laura, trunk lean forward, decreased hip extension and decreased trunk rotation/arm swing - hands on thighs  protectively  BALANCE/PROPRIOCEPTION:    WEIGHTBEARING ALIGNMENT:  shoulders appear slightly to the right of hips, pt unclear if this is new  NON-WEIGHTBEARING ALIGNMENT:    ROM:    Flexion hands to mid thigh only d/t pain, extension major loss with pain; SG L major loss with pain, SG R major loss with less pain  PELVIC/SI SCREEN:   STRENGTH:    Hip abd 3/5 bilat with pain, hip extension 3+/5 bilat with pain    MYOTOMES:   DTR S:  normal  CORD SIGNS:  normal  DERMATOMES:  normal  NEURAL TENSION:  slump negative  FLEXIBILITY:  decreased quadriceps and hip flexors with back pain  LUMBAR/HIP Special Tests:    PELVIS/SI SPECIAL TESTS:   FUNCTIONAL TESTS:   PALPATION:   SPINAL SEGMENTAL CONCLUSIONS: hypomobile L3-5 with pain on palpation      Assessment & Plan   CLINICAL IMPRESSIONS   Medical Diagnosis: Acute LBP    Treatment Diagnosis: acute LBP; underlying deconditioning   Impression/Assessment: Patient is a 42 year old female with low back complaints.  The following significant findings have been identified: Pain, Decreased ROM/flexibility, Decreased joint mobility, Decreased strength, Impaired balance, Impaired gait, Impaired muscle performance, Decreased activity tolerance and Impaired posture. These impairments interfere with their ability to perform self care tasks, work tasks, recreational activities, household chores, driving , household mobility and community mobility as compared to previous level of function.     Clinical Decision Making (Complexity):   Clinical Presentation: Stable/Uncomplicated  Clinical Presentation Rationale: based on medical and personal factors listed in PT evaluation  Clinical Decision Making (Complexity): Low complexity    PLAN OF CARE  Treatment Interventions:  Modalities: E-stim  Interventions: Manual Therapy, Neuromuscular Re-education, Therapeutic Activity, Therapeutic Exercise    Long Term Goals     PT Goal 1  Goal Identifier: Ambulation  Goal Description: Patient will be able to  walk 30 minutes continuously [facility] pain 3/10 or less  Rationale: to maximize safety and independence within the community (and return to work tasks)  Goal Progress: Unable to walk >5 min d/t pain 8/10  Target Date: 10/04/23  PT Goal 2  Goal Identifier: Lifting  Goal Description: Patient will be able to lift and carry 20lb with pain 3/10 or less  Rationale: to maximize safety and independence within the community (and return to work tasks)  Goal Progress: Unable to lift anything d/t pain 8/10 with attempt  Target Date: 10/04/23      Frequency of Treatment: 1 X week  Duration of Treatment: 6 weeks tapering to every other week for 4 weeks    Recommended Referrals to Other Professionals: none  Education Assessment:   Learner/Method: Patient;Significant Other  Education Comments: eval findings, role of PT, POC, return to work considerations - recommend contacting MD with further questions about work restrictions    Risks and benefits of evaluation/treatment have been explained.   Patient/Family/caregiver agrees with Plan of Care.     Evaluation Time:     PT Adriano Low Complexity Minutes (49414): 15      Signing Clinician: Jeffry Perez PT

## 2023-07-14 ENCOUNTER — TELEPHONE (OUTPATIENT)
Dept: FAMILY MEDICINE | Facility: CLINIC | Age: 43
End: 2023-07-14
Payer: COMMERCIAL

## 2023-07-14 NOTE — TELEPHONE ENCOUNTER
Reason for Call:  Other     Detailed comments: Patient said that provider would extend her time off if she continues with symptoms. Will need a letter for the extension.     Phone Number Patient can be reached at: Home number on file [phone] (home)    Best Time:     Can we leave a detailed message on this number? YES    Call taken on 7/14/2023 at 3:06 PM by Eloise Simental

## 2023-07-14 NOTE — LETTER
July 17, 2023      Brynn Sprague  [address]  [place] [address]        To Whom It May Concern:    Brynn Sprague was seen in our clinic. She may return to work on 7/22/23. Please excuse her absence.      Sincerely,        PAPI Raya

## 2023-07-17 ENCOUNTER — THERAPY VISIT (OUTPATIENT)
Dept: PHYSICAL THERAPY | Facility: CLINIC | Age: 43
End: 2023-07-17
Payer: OTHER MISCELLANEOUS

## 2023-07-17 DIAGNOSIS — M54.50 ACUTE BILATERAL LOW BACK PAIN WITHOUT SCIATICA: Primary | ICD-10-CM

## 2023-07-17 PROCEDURE — 97530 THERAPEUTIC ACTIVITIES: CPT | Mod: GP | Performed by: PHYSICAL THERAPIST

## 2023-07-17 PROCEDURE — 97110 THERAPEUTIC EXERCISES: CPT | Mod: GP | Performed by: PHYSICAL THERAPIST

## 2023-07-17 NOTE — TELEPHONE ENCOUNTER
Please call patient and inquire what specific extension she needs, how long, restrictions, etc. Thanks.

## 2023-07-17 NOTE — TELEPHONE ENCOUNTER
Patient said that she is still unable to bend or stretch. Would like this extended to 07/21/2023 from today.

## 2023-07-19 ENCOUNTER — THERAPY VISIT (OUTPATIENT)
Dept: PHYSICAL THERAPY | Facility: CLINIC | Age: 43
End: 2023-07-19
Payer: OTHER MISCELLANEOUS

## 2023-07-19 DIAGNOSIS — M54.50 ACUTE BILATERAL LOW BACK PAIN WITHOUT SCIATICA: Primary | ICD-10-CM

## 2023-07-19 PROCEDURE — 97530 THERAPEUTIC ACTIVITIES: CPT | Mod: GP | Performed by: PHYSICAL THERAPIST

## 2023-07-19 PROCEDURE — 97110 THERAPEUTIC EXERCISES: CPT | Mod: GP | Performed by: PHYSICAL THERAPIST

## 2023-07-24 ENCOUNTER — THERAPY VISIT (OUTPATIENT)
Dept: PHYSICAL THERAPY | Facility: CLINIC | Age: 43
End: 2023-07-24
Payer: OTHER MISCELLANEOUS

## 2023-07-24 DIAGNOSIS — M54.50 ACUTE BILATERAL LOW BACK PAIN WITHOUT SCIATICA: Primary | ICD-10-CM

## 2023-07-24 PROCEDURE — 97110 THERAPEUTIC EXERCISES: CPT | Mod: GP | Performed by: PHYSICAL THERAPIST

## 2023-07-31 ENCOUNTER — THERAPY VISIT (OUTPATIENT)
Dept: PHYSICAL THERAPY | Facility: CLINIC | Age: 43
End: 2023-07-31
Payer: OTHER MISCELLANEOUS

## 2023-07-31 DIAGNOSIS — M54.50 ACUTE BILATERAL LOW BACK PAIN WITHOUT SCIATICA: Primary | ICD-10-CM

## 2023-07-31 PROCEDURE — 97110 THERAPEUTIC EXERCISES: CPT | Mod: GP | Performed by: PHYSICAL THERAPIST

## 2023-07-31 PROCEDURE — 97530 THERAPEUTIC ACTIVITIES: CPT | Mod: GP | Performed by: PHYSICAL THERAPIST

## 2023-08-08 ENCOUNTER — THERAPY VISIT (OUTPATIENT)
Dept: PHYSICAL THERAPY | Facility: CLINIC | Age: 43
End: 2023-08-08
Payer: OTHER MISCELLANEOUS

## 2023-08-08 DIAGNOSIS — M54.50 ACUTE BILATERAL LOW BACK PAIN WITHOUT SCIATICA: Primary | ICD-10-CM

## 2023-08-08 PROCEDURE — 97110 THERAPEUTIC EXERCISES: CPT | Mod: GP | Performed by: PHYSICAL THERAPIST

## 2023-08-08 PROCEDURE — 97530 THERAPEUTIC ACTIVITIES: CPT | Mod: GP | Performed by: PHYSICAL THERAPIST

## 2023-08-22 ENCOUNTER — THERAPY VISIT (OUTPATIENT)
Dept: PHYSICAL THERAPY | Facility: CLINIC | Age: 43
End: 2023-08-22
Payer: OTHER MISCELLANEOUS

## 2023-08-22 DIAGNOSIS — M54.50 ACUTE BILATERAL LOW BACK PAIN WITHOUT SCIATICA: Primary | ICD-10-CM

## 2023-08-22 PROCEDURE — 97110 THERAPEUTIC EXERCISES: CPT | Mod: GP | Performed by: PHYSICAL THERAPIST

## 2023-08-22 PROCEDURE — 97530 THERAPEUTIC ACTIVITIES: CPT | Mod: GP | Performed by: PHYSICAL THERAPIST

## 2023-09-11 ENCOUNTER — THERAPY VISIT (OUTPATIENT)
Dept: PHYSICAL THERAPY | Facility: CLINIC | Age: 43
End: 2023-09-11
Payer: OTHER MISCELLANEOUS

## 2023-09-11 DIAGNOSIS — M54.50 ACUTE BILATERAL LOW BACK PAIN WITHOUT SCIATICA: Primary | ICD-10-CM

## 2023-09-11 PROCEDURE — 97530 THERAPEUTIC ACTIVITIES: CPT | Mod: GP | Performed by: PHYSICAL THERAPIST

## 2023-09-11 PROCEDURE — 97110 THERAPEUTIC EXERCISES: CPT | Mod: GP | Performed by: PHYSICAL THERAPIST

## 2023-09-11 NOTE — PROGRESS NOTES
DISCHARGE  Reason for Discharge: Patient has met all goals.       09/11/23 0500   Appointment Info   Signing clinician's name / credentials Jeffry Perez PT, DPT   Total/Authorized Visits 8 plan   Visits Used 8   Medical Diagnosis Acute LBP   PT Tx Diagnosis acute LBP; underlying deconditioning   Other pertinent information WC   Progress Note/Certification   Onset of illness/injury or Date of Surgery 07/10/23   Therapy Frequency 1 X week   Predicted Duration 6 weeks tapering to every other week for 4 weeks   Progress Note Completed Date 07/12/23   GOALS   PT Goals 2   PT Goal 1   Goal Identifier Ambulation   Goal Description Patient will be able to walk 30 minutes continuously wih pain 3/10 or less   Rationale to maximize safety and independence within the community  (and return to work tasks)   Goal Progress 30 min no pain   Target Date 10/04/23   Date Met 09/11/23   PT Goal 2   Goal Identifier Lifting   Goal Description Patient will be able to lift and carry 20lb with pain 3/10 or less   Rationale to maximize safety and independence within the community  (and return to work tasks)   Goal Progress 20 lb no pain with good form/avoiding lumbar flexion   Target Date 10/04/23   Date Met 09/11/23   Subjective Report   Subjective Report Patient returns after a nearly 3 week hiatus from PT. Back is better than last visit. Exercises are going well, doing them mostly every day - 90% of the time getting everything in. Still modifying some things at work but not feeling any pain. Pain if she tries to lift with a flexed spine but no where near as bad as before.   Objective Measures   Objective Measures Objective Measure 1;Objective Measure 2;Objective Measure 3   Objective Measure 1   Objective Measure Lumbar AROM   Details Lumbar AROM flexion hands to toes, extension min to mod loss with mild end range pain; SB WNL.   Objective Measure 2   Objective Measure Gait   Details Normal speed and pattern.   Objective Measure 3    Objective Measure Strength   Details TrA good setting, fair tonic holding; MMT hip ext 4/5 bilat. Lumbar extensor MMT 3/5.   Therapeutic Procedure/Exercise   Therapeutic Procedures: strength, endurance, ROM, flexibillity minutes (79443) 25   Ther Proc 1 warm up recumbent 8 min   Ther Proc 1 - Details lv 3   PTRx Ther Proc 1 Neutral Spine Slouch Overcorrect   PTRx Ther Proc 1 - Details review, continue hep   PTRx Ther Proc 2 Standing Extension   PTRx Ther Proc 2 - Details review, continue hep   PTRx Ther Proc 3 Prone Press Ups   PTRx Ther Proc 3 - Details review, continue hep   PTRx Ther Proc 4 [place] Stretch   PTRx Ther Proc 5 Bridging #1   PTRx Ther Proc 5 - Details DC   PTRx Ther Proc 6 Bridging #2A Weight Shift   PTRx Ther Proc 7 Prone Lumbar Extension Exercise #3 (Leg Extension)   PTRx Ther Proc 7 - Details review, continue hep   PTRx Ther Proc 8 Supine Abdominal Exercise #3 (Marching)   PTRx Ther Proc 8 - Details 2 X 10 each side   PTRx Ther Proc 9 Clamshell with Theraband   PTRx Ther Proc 9 - Details RTB at knees X 20 each side   PTRx Ther Proc 10 Prone Trunk Extension Position A   PTRx Ther Proc 10 - Details over pillows, hands under face 2 X 10 with encouragement   Skilled Intervention for improved strength   Patient Response/Progress tolerated well, expressed understanding   PTRx Ther Proc 4 - Details review, continue hep   PTRx Ther Proc 6 - Details X 10 each side   PTRx Ther Proc 11 Squat   PTRx Ther Proc 11 - Details spent 10 min instructing - neutral spine, butt back   PTRx Ther Proc 12 Upper Trapezius Stretch   PTRx Ther Proc 12 - Details review, continue hep   PTRx Ther Proc 13 Levator Scapulae Stretch   PTRx Ther Proc 13 - Details review, continue hep   PTRx Ther Proc 14 Pec Stretch Doorway   PTRx Ther Proc 14 - Details review, continue hep   Therapeutic Activity   Therapeutic Activities: dynamic activities to improve functional performance minutes (90666) 10   PTRx Ther Act 1 Body Mechanics  - squat and sit to stand   Skilled Intervention for improved strength   Patient Response/Progress tolerated well, expressed understanding   Plan   Plan for next session DC   Total Session Time   Timed Code Treatment Minutes 35   Total Treatment Time (sum of timed and untimed services) 35       Equipment Issued: none    Discharge Plan: Patient to continue home program.    Referring Provider:  Tristen Mason

## 2023-12-05 ENCOUNTER — OFFICE VISIT (OUTPATIENT)
Dept: URGENT CARE | Facility: URGENT CARE | Age: 43
End: 2023-12-05
Payer: OTHER MISCELLANEOUS

## 2023-12-05 ENCOUNTER — NURSE TRIAGE (OUTPATIENT)
Dept: FAMILY MEDICINE | Facility: CLINIC | Age: 43
End: 2023-12-05
Payer: COMMERCIAL

## 2023-12-05 VITALS
HEIGHT: 63 IN | WEIGHT: 156 LBS | DIASTOLIC BLOOD PRESSURE: 81 MMHG | BODY MASS INDEX: 27.64 KG/M2 | RESPIRATION RATE: 18 BRPM | TEMPERATURE: 98.3 F | SYSTOLIC BLOOD PRESSURE: 123 MMHG | OXYGEN SATURATION: 100 % | HEART RATE: 90 BPM

## 2023-12-05 DIAGNOSIS — M54.50 ACUTE MIDLINE LOW BACK PAIN WITHOUT SCIATICA: ICD-10-CM

## 2023-12-05 DIAGNOSIS — M54.50 ACUTE RIGHT-SIDED LOW BACK PAIN WITHOUT SCIATICA: Primary | ICD-10-CM

## 2023-12-05 DIAGNOSIS — M54.9 SEVERE BACK PAIN: ICD-10-CM

## 2023-12-05 PROCEDURE — 99214 OFFICE O/P EST MOD 30 MIN: CPT | Performed by: NURSE PRACTITIONER

## 2023-12-05 ASSESSMENT — PAIN SCALES - GENERAL: PAINLEVEL: EXTREME PAIN (8)

## 2023-12-05 NOTE — TELEPHONE ENCOUNTER
Nurse Triage SBAR    Is this a 2nd Level Triage? NO    Situation: Pt calling to state that after finishing physical therapy, she is still experiencing back pain. Ibuprofen for pain.    Background: Hx Acute bilateral low back pain without sciatica and completed physical therapy in September, 2023    Assessment: Pt states Pain returned 12/1/23. Pt has been in bed but unable to sleep from pain. Pt unable to work due to pain. Sharp pain in back when pt lifts right leg she gets shooting pain in her back. Pt states that she cannot bend forward properly and has difficulty driving due to back pain.     Protocol Recommended Disposition:   See in Office Today: RN advised pt to be seen at nearest  as no available appointment at [facility]. Pt verbalized understanding and RN assisted pt in finding nearby ([facility]) to further evaluated for excruciating lower back pain.     Recommendation: no actions needed as pt in agreement to be seen at .       Yuridia Dugan RN on 12/5/2023 at 1:32 PM      Reason for Disposition   SEVERE back pain (e.g., excruciating, unable to do any normal activities) and not improved after pain medicine and CARE ADVICE    Additional Information   Negative: Passed out (i.e., fainted, collapsed and was not responding)   Negative: Shock suspected (e.g., cold/pale/clammy skin, too weak to stand, low BP, rapid pulse)   Negative: Sounds like a life-threatening emergency to the triager   Negative: Major injury to the back (e.g., MVA, fall > 10 feet or 3 meters, penetrating injury, etc.)   Negative: Pain in the upper back over the ribs (rib cage) that radiates (travels) into the chest   Negative: Pain in the upper back over the ribs (rib cage) and worsened by coughing (or clearly increases with breathing)   Negative: Back pain during pregnancy   Negative: SEVERE back pain of sudden onset and age > 60 years   Negative: SEVERE abdominal pain (e.g., excruciating)   Negative: Abdominal pain and age > 60  "years   Negative: Unable to urinate (or only a few drops) and bladder feels very full   Negative: Loss of bladder or bowel control (urine or bowel incontinence; wetting self, leaking stool) of new-onset   Negative: Numbness (loss of sensation) in groin or rectal area   Negative: Pain radiates into groin, scrotum   Negative: Blood in urine (red, pink, or tea-colored)   Negative: Vomiting and pain over lower ribs of back (i.e., flank - kidney area)   Negative: Weakness of a leg or foot (e.g., unable to bear weight, dragging foot)   Negative: Patient sounds very sick or weak to the triager   Negative: Fever > 100.4 F (38.0 C) and flank pain   Negative: Pain or burning with passing urine (urination)    Answer Assessment - Initial Assessment Questions  1. ONSET: \"When did the pain begin?\"       12/1/23  2. LOCATION: \"Where does it hurt?\" (upper, mid or lower back)      Mid lower back and right side  3. SEVERITY: \"How bad is the pain?\"  (e.g., Scale 1-10; mild, moderate, or severe)    - MILD (1-3): Doesn't interfere with normal activities.     - MODERATE (4-7): Interferes with normal activities or awakens from sleep.     - SEVERE (8-10): Excruciating pain, unable to do any normal activities.       Pain of 9, excruciating pain, unable to do any normal activities   4. PATTERN: \"Is the pain constant?\" (e.g., yes, no; constant, intermittent)       Constant   5. RADIATION: \"Does the pain shoot into your legs or somewhere else?\"      no  6. CAUSE:  \"What do you think is causing the back pain?\"       unknown  7. 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 MEDICINES: \"What have you taken so far for the pain?\" (e.g., nothing, acetaminophen, NSAIDS)      ibuprofen  9. NEUROLOGIC SYMPTOMS: \"Do you have any weakness, numbness, or problems with bowel/bladder control?\"      no  10. OTHER SYMPTOMS: \"Do you have any other " "symptoms?\" (e.g., fever, abdomen pain, burning with urination, blood in urine)        Fever 12/4/23 overnight but resolved with ibuprofen    Protocols used: Back Pain-A-[place]    "

## 2023-12-05 NOTE — PROGRESS NOTES
"Assessment & Plan     Acute right-sided low back pain without sciatica    Severe back pain    Acute midline low back pain without sciatica       With severe back pain, unable to fully evaluate due to severe pain with severe lumbar spine tenderness recommend further evaluation in emergency room as pain control and advanced imaging indicated. Patient agreeable and is discharged in stable condition.       Kassidy Veronica NP  [facility]    Humberto Swain is a 43 year old female who presents to clinic today with her son for the following health issues:  Chief Complaint   Patient presents with    Back Pain     Lower sharp pain       Back Pain    Onset of symptoms was 4 day(s) ago.  Location: right low back  Radiation: does not radiate  Context:   She states she was injured at work in July lifting a bucket. No new injury, but states this is the same pain in her back  Course of symptoms is worsening.    Severity severe  Current and Associated symptoms: pain  Denies: fecal incontinence, urinary incontinence, lower extremity numbness, lower extremity weakness, and paresthesia    Aggravating Factors: movement  Therapies to improve symptoms include:400 mg advil last at 2pm has not helped  History of bilateral low back pain without sciatica and was referred to physical therapy which she finished Sept 2023. No history of back surgery  She has a history of kidney cancer and has both kidneys functioning, was never told to avoid NSAIDs      Problem list, Medication list, Allergies, and Medical history reviewed in EPIC.    ROS:  Review of systems negative except for noted above        Objective    /81 (BP Location: Left arm, Patient Position: Sitting, Cuff Size: Adult Regular)   Pulse 90   Temp 98.3  F (36.8  C) (Tympanic)   Resp 18   Ht 1.6 m (5' 3\")   Wt 70.8 kg (156 lb)   SpO2 100%   BMI 27.63 kg/m    Physical Exam  Constitutional:       General: She is in acute distress.      " Comments: Moaning in pain, wincing, holding back   Musculoskeletal:      Comments: Decreased lumbar ROM, unable to flex at all. Decreased ROM lumbar extension, lateral flexion. Severe tenderness with palpation midline lumbar spine. Patient unable to lay on exam table for further evaluation   Skin:     General: Skin is warm and dry.   Neurological:      Mental Status: She is alert.      Gait: Gait abnormal.      Comments: Unable to walk on heels, toes. Able to straight line walk. Changing positions slowly

## 2023-12-06 ENCOUNTER — OFFICE VISIT (OUTPATIENT)
Dept: FAMILY MEDICINE | Facility: CLINIC | Age: 43
End: 2023-12-06
Payer: OTHER MISCELLANEOUS

## 2023-12-06 VITALS
SYSTOLIC BLOOD PRESSURE: 100 MMHG | TEMPERATURE: 98.9 F | HEIGHT: 63 IN | DIASTOLIC BLOOD PRESSURE: 82 MMHG | WEIGHT: 148 LBS | OXYGEN SATURATION: 98 % | HEART RATE: 81 BPM | BODY MASS INDEX: 26.22 KG/M2 | RESPIRATION RATE: 18 BRPM

## 2023-12-06 DIAGNOSIS — M54.50 ACUTE RIGHT-SIDED LOW BACK PAIN WITHOUT SCIATICA: Primary | ICD-10-CM

## 2023-12-06 PROCEDURE — 99214 OFFICE O/P EST MOD 30 MIN: CPT | Performed by: PHYSICIAN ASSISTANT

## 2023-12-06 RX ORDER — CYCLOBENZAPRINE HCL 10 MG
5-10 TABLET ORAL 3 TIMES DAILY PRN
Qty: 25 TABLET | Refills: 0 | Status: SHIPPED | OUTPATIENT
Start: 2023-12-06

## 2023-12-06 ASSESSMENT — ENCOUNTER SYMPTOMS: BACK PAIN: 1

## 2023-12-06 NOTE — PROGRESS NOTES
Assessment & Plan   Problem List Items Addressed This Visit    None  Visit Diagnoses       Acute right-sided low back pain without sciatica    -  Primary    Relevant Medications    cyclobenzaprine (FLEXERIL) 10 MG tablet    Other Relevant Orders    Massage Therapy Referral    Physical Therapy Referral    UA Macroscopic with reflex to Microscopic and Culture - Lab Collect    MR Lumbar Spine w/o Contrast           I do not believe a fracture to be the source of this patient's pain as there was no preceding significant trauma and CT yesterday reassuring.  I do not believe the pain is caused by an epidural abscess as the patient denies hx of IV drug use and does not have fevers/chills and no recent procedures.    I do not believe this patient's pain is from an infiltrative vertebral tumor as the patient does not have weight loss or night sweats and no known history of cancer.    I do not believe this patient's pain represents a rupture AAA.  There is no palpable, pulsatile mass on exam and patient does not have any ABD pain.       Based on history and exam, the most likely etiology of this patient's back pain is lumbosacral stain, less likely radiculopathy.  Emergent MRI is not indicated as this patient does not have new weakness or cauda equina syndrome.  Patient has no saddle anesthesia, bowel or bladder incontinence. Will send home with otc analgesics,  muscle relaxant for breakthrough pain/spasm, rice/heat, and massage/physical therapy referral.  Follow-up in 1 month if not improving for MRI and recheck.     Complete history and physical exam as below. Afebrile with normal vital signs.     DDx and Dx discussed with and explained to the pt to their satisfaction.  All questions were answered at this time. Pt expressed understanding of and agreement with this dx, tx, and plan. No further workup warranted and standard medication warnings given. I have given the patient a list of pertinent indications for  "re-evaluation. Will go to the Emergency Department if symptoms worsen or new concerning symptoms arise. Patient left in no apparent distress.     Review of prior external note(s) from - CareEverywhere information from [facility] reviewed  Ordering of each unique test  Prescription drug management  30 minutes spent by me on the date of the encounter doing chart review, history and exam, documentation and further activities per the note     BMI:   Estimated body mass index is 26.22 kg/m  as calculated from the following:    Height as of this encounter: 1.6 m (5' 3\").    Weight as of this encounter: 67.1 kg (148 lb).     See Patient Instructions    PAPI Raya [facility] [place] Swain is a 43 year old, presenting for the following health issues:  Back Pain        12/6/2023     9:10 AM   Additional Questions   Roomed by Michelle   Accompanied by maya         12/6/2023     9:10 AM   Patient Reported Additional Medications   Patient reports taking the following new medications none       Back Pain   This is a recurrent problem. The current episode started more than 2 days ago. The problem occurs constantly. The problem has been gradually worsening. The pain is associated with a recent injury. The quality of the pain is described as shooting and stabbing (bendin, talking stairs, standing from sitting). The pain does not radiate. The pain is at a severity of 8/10. The pain is severe. The symptoms are aggravated by certain positions. The pain is The same all the time. She has tried heat for the symptoms.      Low back pain that began 5 days ago and worsened since then. Worse with working as a . No specific injury. Right sided. Radiates into the center of her back. Worse with walking. No numbness/tingling, incontinence, drug/tobacco/EtOH use, other symptoms. Pt had  ER visit yesterday with reassuring CT of her lumbar spine.    Ibu helps pain somewhat.     Review of Systems " "  Musculoskeletal:  Positive for back pain.      Constitutional, msk, neuro, cardiovascular, pulmonary, gi and gu systems are negative, except as otherwise noted.      Objective    /82   Pulse 81   Temp 98.9  F (37.2  C) (Temporal)   Resp 18   Ht 1.6 m (5' 3\")   Wt 67.1 kg (148 lb)   SpO2 98%   BMI 26.22 kg/m    Body mass index is 26.22 kg/m .  Physical Exam  Vitals and nursing note reviewed.   Constitutional:       General: She is not in acute distress.     Appearance: She is not ill-appearing or diaphoretic.   HENT:      Head: Normocephalic and atraumatic.      Mouth/Throat:      Mouth: Mucous membranes are moist.   Eyes:      Conjunctiva/sclera: Conjunctivae normal.   Cardiovascular:      Rate and Rhythm: Normal rate and regular rhythm.      Heart sounds: Normal heart sounds. No murmur heard.     No friction rub. No gallop.   Pulmonary:      Effort: Pulmonary effort is normal. No respiratory distress.      Breath sounds: Normal breath sounds. No stridor. No wheezing, rhonchi or rales.   Abdominal:      General: Bowel sounds are normal. There is no distension.      Palpations: Abdomen is soft. There is no mass.      Tenderness: There is no abdominal tenderness. There is no guarding or rebound.      Hernia: No hernia is present.   Musculoskeletal:      Comments: No CVA or midline spinal tenderness, though she is tender along the right lumbar paraspinal muscles. No overlying signs of trauma or infection.     Skin:     General: Skin is warm and dry.   Neurological:      General: No focal deficit present.      Mental Status: She is alert. Mental status is at baseline.      Comments: Able to toe lift, heel walk and knee bend.   Psychiatric:         Mood and Affect: Mood normal.         Behavior: Behavior normal.                        "

## 2023-12-06 NOTE — PATIENT INSTRUCTIONS
Crys Swain,    Thank you for allowing [facility] to manage your care.    This is likely a muscle strain of your back and it will improve in the next 2 weeks.    If you develop worsening/changing symptoms at any time such as numbness tingling in the groin, incontinence, weakness, fevers, etc., please be seen in clinic/urgent care or call 911/go to the emergency department for evaluation as we discussed.    I ordered some lab work. Please go to the laboratory to get your studies.    I sent your prescriptions to your pharmacy.    I ordered an MRI if you are not improving in the next 2-3 weeks. Please call diagnostic imaging [phone] to schedule your test.    I made a referral to PT. They will be calling in approximately 1 week to set up your appointment.  If you do not hear from them, please call the specialty number on your after visit summary.     Please allow 1-2 business days for our office to contact you in regards to your laboratory/radiological studies.  If not done so, I encourage you to login into [employer] ([email or web address] to review your results as well.     If you have any questions or concerns, please feel free to call us at [phone]    Sincerely,    Arsenio Mason PA-C    Did you know?      You can schedule a video visit for follow-up appointments as well as future appointments for certain conditions.  Please see the below link.     [email or web address]    If you have not already done so,  I encourage you to sign up for [employer] ([email or web address]).  This will allow you to review your results, securely communicate with a provider, and schedule virtual visits as well.

## 2023-12-06 NOTE — LETTER
December 6, 2023      Brynn Sprague  [address]  [place] [address]        To Whom It May Concern:    Brynn Sprague  was seen on 12/6/23.  Please excuse her  until 12/18/23 due to injury unless she feels well enough to return sooner.        Sincerely,        PAPI Raya

## 2023-12-07 ENCOUNTER — THERAPY VISIT (OUTPATIENT)
Dept: PHYSICAL THERAPY | Facility: CLINIC | Age: 43
End: 2023-12-07
Attending: PHYSICIAN ASSISTANT
Payer: OTHER MISCELLANEOUS

## 2023-12-07 DIAGNOSIS — M54.50 ACUTE RIGHT-SIDED LOW BACK PAIN WITHOUT SCIATICA: ICD-10-CM

## 2023-12-07 PROCEDURE — 97110 THERAPEUTIC EXERCISES: CPT | Mod: GP

## 2023-12-07 PROCEDURE — 97161 PT EVAL LOW COMPLEX 20 MIN: CPT | Mod: GP

## 2023-12-07 NOTE — PROGRESS NOTES
PHYSICAL THERAPY EVALUATION  Type of Visit: Evaluation    See electronic medical record for Abuse and Falls Screening details.    Subjective       Presenting condition or subjective complaint: Back pain  Date of onset: 12/01/23    Relevant medical history:     Dates & types of surgery:    Used to have therapy for back. Used to come and go, but now it won't go away. Had pain starting last Friday, progressively got worse until Tuesday when she had to leave work early because the pain got so bad. Having trouble bending forward. Going up stairs is hard. Denies radiating pain or numbness/tingling in LE.    Prior diagnostic imaging/testing results: CT scan     Prior therapy history for the same diagnosis, illness or injury: Yes      Prior Level of Function  Transfers:   Ambulation:   ADL:   IADL:     Living Environment  Social support: With family members   Type of home: House   Stairs to enter the home: Yes 12     Ramp:     Stairs inside the home:         Help at home:    Equipment owned:       Employment: Yes    Hobbies/Interests:      Patient goals for therapy: bending, walking, sleeping, driving    Pain assessment:      Objective   LUMBAR SPINE EVALUATION  PAIN: Pain Level at Rest: 8/10  Pain Level with Use: 9/10  Pain Location: lumbar spine  Pain Quality: Shooting  Pain Frequency: constant  Pain is Worst: nighttime  Pain is Exacerbated By: bending forward, going up stairs, walking for long distances, lifting leg  Pain is Relieved By: heat and otc medications  Pain Progression: prior to getting muscle relaxer things were very bad, since yesterday things have been a bit better.  INTEGUMENTARY (edema, incisions):   POSTURE: Standing Posture: Rounded shoulders, Forward head, Lordosis decreased  Sitting Posture: Rounded shoulders, Forward head, Lordosis decreased  GAIT:   Weightbearing Status:   Assistive Device(s):   Gait Deviations:  Decreased step and stride length bilaterally with minimal trunk rotation or arm  swing  BALANCE/PROPRIOCEPTION:   WEIGHTBEARING ALIGNMENT:   NON-WEIGHTBEARING ALIGNMENT:    ROM:   (Degrees) Left AROM Left PROM  Right AROM Right PROM   Hip Flexion       Hip Extension       Hip Abduction       Hip Adduction       Hip Internal Rotation       Hip External Rotation       Knee Flexion       Knee Extension       Lumbar Sidebend/rotation SB: Moderately limited ROM  Rot:  slightly limited ROM SB: Slight-moderate limitation in ROM  Rot: Moderately limited ROM   Lumbar Flexion Maximally limited ROM with pain/apprehension   Lumbar Extension Slightly limited ROM with some posterior neck pain as pt was moving head more than lumbar spine or trunk   Pain: pain at end ranges of motion  End feel:   PELVIC/SI SCREEN:   STRENGTH:     MYOTOMES:    Left Right   T12-L3 (Hip Flexion) 4+ 4-   L2-4 (Quads)  5 4+   L4 (Ankle DF) 5 4   L5 (Great Toe Ext) 5 5   S1 (Toe Raise) 5 5   S2 (knee flexion): 4/5 shannan with pain recreated in R LB with shannan testing    DTR S:   CORD SIGNS:   DERMATOMES:   NEURAL TENSION:   FLEXIBILITY:   LUMBAR/HIP Special Tests:    PELVIS/SI SPECIAL TESTS:   FUNCTIONAL TESTS:  Sit<>stand: Pt requires increased time with use of shannan UEs to push up from chair. Signs of pain with movement present. Pt demonstrates good technique going from sidelying to sit on right side with minimal pain.  PALPATION:   SPINAL SEGMENTAL CONCLUSIONS:       Assessment & Plan   CLINICAL IMPRESSIONS  Medical Diagnosis: Acute right-sided low back pain without sciatica    Treatment Diagnosis: Acute right-sided low back pain without sciatica   Impression/Assessment: Patient is a 43 year old female with right sided low back complaints.  The following significant findings have been identified: Pain, Decreased ROM/flexibility, Decreased joint mobility, Decreased strength, Impaired gait, Impaired muscle performance, Decreased activity tolerance, and Impaired posture. These impairments interfere with their ability to perform self care  tasks, work tasks, recreational activities, driving , household mobility, and community mobility as compared to previous level of function.     Clinical Decision Making (Complexity):  Clinical Presentation: Stable/Uncomplicated  Clinical Presentation Rationale: based on medical and personal factors listed in PT evaluation  Clinical Decision Making (Complexity): Low complexity    PLAN OF CARE  Treatment Interventions:  Modalities: Cryotherapy, E-stim, Hot Pack  Interventions: Manual Therapy, Neuromuscular Re-education, Therapeutic Activity, Therapeutic Exercise    Long Term Goals     PT Goal 1  Goal Identifier: LTG 1  Goal Description: Pt to be able to walk >100 yards with 0/10 LBP  Rationale: to maximize safety and independence with performance of ADLs and functional tasks  Target Date: 01/18/24  PT Goal 2  Goal Identifier: LTG  Goal Description: Pt to be able to sleep through the night (7-8 hrs) undisturbed by LBP  Rationale: to maximize safety and independence with performance of ADLs and functional tasks (Sleep hygeine)  Target Date: 01/18/24      Frequency of Treatment: 1x per week  Duration of Treatment: 6 weeks    Recommended Referrals to Other Professionals:   Education Assessment:   Learner/Method: Patient;No Barriers to Learning    Risks and benefits of evaluation/treatment have been explained.   Patient/Family/caregiver agrees with Plan of Care.     Evaluation Time:     PT Eval, Low Complexity Minutes (65037): 27       Signing Clinician: Delroy Lindsey PT

## 2023-12-14 ENCOUNTER — TELEPHONE (OUTPATIENT)
Dept: FAMILY MEDICINE | Facility: CLINIC | Age: 43
End: 2023-12-14
Payer: COMMERCIAL

## 2023-12-14 NOTE — TELEPHONE ENCOUNTER
12/18/2023 7:08  Patient called to request the note, she is still unable to work and needs the note ASAP.     Reason for Call:  Other     Detailed comments: Patient is requesting an extension in her work note due to she is still unable to bend or move     Phone Number Patient can be reached at: Home number on file [phone] (home)    Best Time:     Can we leave a detailed message on this number? YES    Call taken on 12/14/2023 at 10:29 AM by Eloise Simental

## 2023-12-15 ENCOUNTER — TELEPHONE (OUTPATIENT)
Dept: FAMILY MEDICINE | Facility: CLINIC | Age: 43
End: 2023-12-15
Payer: COMMERCIAL

## 2023-12-15 NOTE — LETTER
December 15, 2023      Brynn Sprague  [address]  [place] [address]        To Whom It May Concern:     Brynn Sprague  was seen on 12/6/23.  Please excuse her  until 1/1/24 due to injury unless she feels well enough to return sooner.           Sincerely,           PAPI Raya

## 2023-12-18 NOTE — TELEPHONE ENCOUNTER
Spoke with patient, informed letter was written by Arsenio Mason on 12/15/23 and mailed to patients home address. Patient would like to  letter at  today.    Letter placed at .

## 2024-01-03 ENCOUNTER — ANCILLARY PROCEDURE (OUTPATIENT)
Dept: MRI IMAGING | Facility: CLINIC | Age: 44
End: 2024-01-03
Attending: PHYSICIAN ASSISTANT
Payer: OTHER MISCELLANEOUS

## 2024-01-03 DIAGNOSIS — M54.50 ACUTE RIGHT-SIDED LOW BACK PAIN WITHOUT SCIATICA: Primary | ICD-10-CM

## 2024-01-03 DIAGNOSIS — M54.50 ACUTE RIGHT-SIDED LOW BACK PAIN WITHOUT SCIATICA: ICD-10-CM

## 2024-01-03 PROCEDURE — 72148 MRI LUMBAR SPINE W/O DYE: CPT | Mod: TC | Performed by: RADIOLOGY

## 2024-01-03 NOTE — LETTER
January 4, 2024      Brynn Sprague  [address] [place] [address]        Dear ,    We are writing to inform you of your test results.    You do have an irritated nerve in your back likely causing your symptoms. If you are interested in a steroid injection which could help, I have referred you for this. If PT is helping, I would continue this route.     Resulted Orders   MR Lumbar Spine w/o Contrast    Narrative    MR LUMBAR SPINE WITHOUT CONTRAST 1/3/2024 4:09 PM    INDICATION: Acute right-sided low back pain without sciatica.  TECHNIQUE: MRI images of the lumbar spine without contrast.  CONTRAST: None  COMPARISON: Lumbar spine radiograph 8/25/2014.    FINDINGS: Nomenclature is based on 5 lumbar type vertebral bodies.  Normal vertebral body heights. Normal alignment.   No marrow edema. No pars defect. The conus tip is identified at L1.  Unremarkable paraspinal soft tissues.    T12-L1: Normal disc height and signal. No herniation. No facet  arthropathy. No spinal canal or neural foraminal stenosis.        L1-L2: Normal disc height and signal. No herniation. No facet  arthropathy. No spinal canal or neural foraminal stenosis.    L2-L3: Slight loss of disc height and signal. Minimal linear bulging.  Minor facet arthropathy. No stenosis.    L3-L4: Slight loss of disc signal. Normal disc height. Minimal annular  bulging. Minor facet arthropathy. No stenosis.    L4-L5: Moderate loss of disc signal. Minimal loss of disc height. Mild  to moderate circumferential disc bulge with central and foraminal  annular tears. Mild to moderate facet arthropathy. Disc bulge abuts  the traversing L5 nerves within the lateral recesses. Mild spinal  canal stenosis and foraminal narrowing.    L5-S1: Slight loss of disc signal. Normal disc height. Left  paracentral annular tear. Minor annular bulging. Unremarkable facets.  Adequate canal and foramina.      Impression    IMPRESSION:  1.  At L4-L5 a degenerative disc bulge  abuts the traversing nerve  roots. Correlate for any L5 symptoms. Mild spinal canal and foraminal  narrowing.  2.  Minor degenerative changes L2-L3, L3-L4 and L5-S1 without  stenosis.    CEDRIC MANCIA MD         SYSTEM ID:  [id number]       If you have any questions or concerns, please call the clinic at the number listed above.       Sincerely,      PAPI Raya

## 2024-01-04 ENCOUNTER — THERAPY VISIT (OUTPATIENT)
Dept: PHYSICAL THERAPY | Facility: CLINIC | Age: 44
End: 2024-01-04
Payer: OTHER MISCELLANEOUS

## 2024-01-04 DIAGNOSIS — M54.50 ACUTE RIGHT-SIDED LOW BACK PAIN WITHOUT SCIATICA: Primary | ICD-10-CM

## 2024-01-04 PROCEDURE — 97110 THERAPEUTIC EXERCISES: CPT | Mod: GP

## 2024-01-04 PROCEDURE — 97530 THERAPEUTIC ACTIVITIES: CPT | Mod: GP

## 2024-01-04 NOTE — RESULT ENCOUNTER NOTE
Team - please call patient with results. She does have an irritated nerve in her back likely causing her symptoms. If she is interested in a steroid injection which could help, I have referred her for this. If PT is helping, I would continue this route. Thanks.

## 2024-01-05 ENCOUNTER — VIRTUAL VISIT (OUTPATIENT)
Dept: FAMILY MEDICINE | Facility: CLINIC | Age: 44
End: 2024-01-05
Payer: COMMERCIAL

## 2024-01-05 DIAGNOSIS — M54.50 ACUTE BILATERAL LOW BACK PAIN WITHOUT SCIATICA: ICD-10-CM

## 2024-01-05 DIAGNOSIS — M54.50 ACUTE RIGHT-SIDED LOW BACK PAIN WITHOUT SCIATICA: Primary | ICD-10-CM

## 2024-01-05 PROCEDURE — 99441 PR PHYSICIAN TELEPHONE EVALUATION 5-10 MIN: CPT | Mod: 93 | Performed by: PHYSICIAN ASSISTANT

## 2024-01-05 NOTE — PROGRESS NOTES
"Brynn is a 43 year old who is being evaluated via a billable telephone visit.      What phone number would you like to be contacted at? [phone]  How would you like to obtain your AVS? Mail a copy    Distant Location (provider location):  On-site    Assessment & Plan   Problem List Items Addressed This Visit          Nervous and Auditory    Acute bilateral low back pain without sciatica    Acute right-sided low back pain without sciatica - Primary       Brynn Sprague is a 43 year old female with no significant PMH who has been suffering from low back pain bilaterally for some months. Recent MRI shows disc bulge affecting L4-5 nerve roots among other degenerative issues. She prefers to continue PT as she has had significant improvement. Will continue tx with analgesics otc, RICE/heat, and pain eval prn. Work restrictions provided. Encourage follow up with primary. Red flag symptoms discussed and currently absent.     DDx and Dx discussed with and explained to the pt to their satisfaction.  All questions were answered at this time. Pt expressed understanding of and agreement with this dx, tx, and plan. No further workup warranted and standard medication warnings given. I have given the patient a list of pertinent indications for re-evaluation. Will go to the Emergency Department if symptoms worsen or new concerning symptoms arise. Patient left the call in no apparent distress.     10 minutes spent by me on the date of the encounter doing chart review, history and exam, documentation and further activities per the note     BMI:   Estimated body mass index is 26.22 kg/m  as calculated from the following:    Height as of 12/6/23: 1.6 m (5' 3\").    Weight as of 12/6/23: 67.1 kg (148 lb).     See Patient Instructions    PAPI Raya  [facility] [place] is a 43 year old, presenting for the following health issues:  Forms and Results (MRI)        1/5/2024     2:38 PM "   Additional Questions   Roomed by Mary Zheng CMA   Accompanied by N/A         1/5/2024     2:38 PM   Patient Reported Additional Medications   Patient reports taking the following new medications No new medications       HPI     Patient would like a work note with lifting restrictions of 20 lbs, as well as looking over the MRI results on 01/03/2024. 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 Overall, she is feeling much improved after PT. Unsure whether she wants a pain injection currently.    Review of Systems   Constitutional, HEENT, cardiovascular, pulmonary, gi and gu systems are negative, except as otherwise noted.      Objective           Vitals:  No vitals were obtained today due to virtual visit.    Physical Exam   healthy, alert, and no distress  PSYCH: Alert and oriented times 3; coherent speech, normal   rate and volume, able to articulate logical thoughts, able   to abstract reason, no tangential thoughts, no hallucinations   or delusions  Her affect is normal and pleasant  RESP: No cough, no audible wheezing, able to talk in full sentences  Remainder of exam unable to be completed due to telephone visits      Phone call duration: 8 minutes

## 2024-01-10 ENCOUNTER — TELEPHONE (OUTPATIENT)
Dept: FAMILY MEDICINE | Facility: CLINIC | Age: 44
End: 2024-01-10
Payer: COMMERCIAL

## 2024-01-10 NOTE — LETTER
January 11, 2024      Brynn Sprague  [address]  [place] [address]        To Whom It May Concern:    Brynn Sprague was seen in our clinic. 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      Sincerely,      Tristen Mason

## 2024-01-10 NOTE — TELEPHONE ENCOUNTER
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 Patient advised she may need an office visit to further access and address her request. Please call her to advise.    Thank you,  Kennedy Huntley Registration     "

## 2024-02-12 ENCOUNTER — THERAPY VISIT (OUTPATIENT)
Dept: PHYSICAL THERAPY | Facility: CLINIC | Age: 44
End: 2024-02-12
Payer: OTHER MISCELLANEOUS

## 2024-02-12 DIAGNOSIS — M54.50 ACUTE RIGHT-SIDED LOW BACK PAIN WITHOUT SCIATICA: Primary | ICD-10-CM

## 2024-02-12 PROCEDURE — 97110 THERAPEUTIC EXERCISES: CPT | Mod: GP

## 2024-02-12 NOTE — PROGRESS NOTES
02/12/24 0500   Appointment Info   Signing clinician's name / credentials Delroy Lindsey, PT, DPT, CSCS   Total/Authorized Visits E&T   Visits Used 3   Medical Diagnosis Acute right-sided low back pain without sciatica   PT Tx Diagnosis Acute right-sided low back pain without sciatica   Progress Note/Certification   Onset of illness/injury or Date of Surgery 12/01/23   Therapy Frequency 1x per week   Predicted Duration 4 weeks   Progress Note Due Date 03/11/24   Progress Note Completed Date 12/07/23   PT Goal 1   Goal Identifier LTG 1   Goal Description Pt to be able to walk >100 yards with 0/10 LBP   Rationale to maximize safety and independence with performance of ADLs and functional tasks   Goal Progress can walk household distances without pain   Target Date 01/18/24   PT Goal 2   Goal Identifier LTG   Goal Description Pt to be able to sleep through the night (7-8 hrs) undisturbed by LBP   Rationale to maximize safety and independence with performance of ADLs and functional tasks  (Sleep hygeine)   Goal Progress waking up 1-3x/night d/t pain   Target Date 01/18/24   Subjective Report   Subjective Report Back is better. Still not 100%, thinks she's at 85%. Cold days and a lot of activity makes pain worse. Exercises are helping her. Limited to sitting for 15 minutes before pain.   Treatment Interventions (PT)   Interventions Therapeutic Procedure/Exercise;Neuromuscular Re-education   Therapeutic Procedure/Exercise   Therapeutic Procedures: strength, endurance, ROM, flexibillity minutes (67545) 30   PTRx Ther Proc 1 Supine Lumbar Hip Roll   PTRx Ther Proc 1 - Details x10 each direction   PTRx Ther Proc 2 Posterior Pelvic Tilt   PTRx Ther Proc 2 - Details x10 - pain relief   PTRx Ther Proc 3 Supine Abdominal Exercise #3 (Marching)   PTRx Ther Proc 3 - Details 2x15 marches each side - reports this still challenges her core muscles   PTRx Ther Proc 4 Bridging   PTRx Ther Proc 4 - Details 2x10    PTRx Ther Proc 5  "Prone Press Ups   PTRx Ther Proc 5 - Details VR   PTRx Ther Proc 6 Prone On Elbows   PTRx Ther Proc 6 - Details VR   PTRx Ther Proc 7 Standing Extension at Counter Supported   PTRx Ther Proc 7 - Details x15 - cues to reduce head movement and focus on moving at the lumbar spine/trunk   Skilled Intervention modified/progressed/regressed exercise based on patient response   Patient Response/Progress Tolerated well   PTRx Ther Proc 8 Hip AROM Standing Extension   PTRx Ther Proc 8 - Details x10 each side - cues to not hold in full extension   PTRx Ther Proc 9 Hip AROM Standing Abduction   PTRx Ther Proc 9 - Details x10 each side - reports some pain with R LE WB   PTRx Ther Proc 10 Pallof Press   PTRx Ther Proc 10 - Details 2x10 each side one with green one with blue - blue for HEP   Neuromuscular Re-education   PTRx Neuro Re-ed 1 Single Leg Stance - Balance Left Foot   PTRx Neuro Re-ed 1 - Details x30\" hold R side    Education   Learner/Method Patient;No Barriers to Learning   Plan   Home program PTRx (link provided)   Plan for next session Assess response to HEP, MT to LB, core strengthening, QL stretch? hip/lumbar extension strength   Total Session Time   Timed Code Treatment Minutes 30   Total Treatment Time (sum of timed and untimed services) 30         PLAN  Continue therapy per current plan of care.    Beginning/End Dates of Progress Note Reporting Period:  12/07/23 to 02/12/2024    Referring Provider:  Tristen Mason    "

## 2024-02-26 ENCOUNTER — THERAPY VISIT (OUTPATIENT)
Dept: PHYSICAL THERAPY | Facility: CLINIC | Age: 44
End: 2024-02-26
Payer: OTHER MISCELLANEOUS

## 2024-02-26 DIAGNOSIS — M54.50 ACUTE RIGHT-SIDED LOW BACK PAIN WITHOUT SCIATICA: Primary | ICD-10-CM

## 2024-02-26 PROCEDURE — 97110 THERAPEUTIC EXERCISES: CPT | Mod: GP | Performed by: PHYSICAL THERAPIST

## 2024-03-11 ENCOUNTER — THERAPY VISIT (OUTPATIENT)
Dept: PHYSICAL THERAPY | Facility: CLINIC | Age: 44
End: 2024-03-11
Payer: OTHER MISCELLANEOUS

## 2024-03-11 DIAGNOSIS — M54.50 ACUTE RIGHT-SIDED LOW BACK PAIN WITHOUT SCIATICA: Primary | ICD-10-CM

## 2024-03-11 PROCEDURE — 97110 THERAPEUTIC EXERCISES: CPT | Mod: GP

## 2024-03-11 PROCEDURE — 97530 THERAPEUTIC ACTIVITIES: CPT | Mod: GP

## 2024-03-12 NOTE — PROGRESS NOTES
"   03/11/24 0500   Appointment Info   Signing clinician's name / credentials Delroy Lindsey, DPT, CSCS   Total/Authorized Visits E&T   Visits Used 5   Medical Diagnosis Acute right-sided low back pain without sciatica   PT Tx Diagnosis Acute right-sided low back pain without sciatica   Progress Note/Certification   Onset of illness/injury or Date of Surgery 12/01/23   Therapy Frequency 1x per week   Predicted Duration 4 weeks   Progress Note Due Date 03/11/24   Progress Note Completed Date 12/07/24   GOALS   PT Goals 2   PT Goal 1   Goal Identifier LTG 1   Goal Description Pt to be able to walk >100 yards with 0/10 LBP   Rationale to maximize safety and independence with performance of ADLs and functional tasks   Goal Progress Can walk for 15 minutes without 0/10   Target Date 01/18/24   Date Met 02/26/24   PT Goal 2   Goal Identifier LTG   Goal Description Pt to be able to sleep through the night (7-8 hrs) undisturbed by LBP   Rationale to maximize safety and independence with performance of ADLs and functional tasks  (Sleep hygeine)   Goal Progress waking up 1x/week   Target Date 01/18/24   Subjective Report   Subjective Report back is good, every day gets better. Walking with heeled shoes and lifting, as well as leaning over to wash her face.   Treatment Interventions (PT)   Interventions Therapeutic Procedure/Exercise;Therapeutic Activity   Therapeutic Procedure/Exercise   Therapeutic Procedures: strength, endurance, ROM, flexibility minutes (29692) 25   Ther Proc 1 Modified side plank   Ther Proc 1 - Details 2x10\" hold - cued to keep body in line from hip to shoulder   PTRx Ther Proc 1 Supine Lumbar Hip Roll   PTRx Ther Proc 1 - Details HEP - symptom relief   PTRx Ther Proc 2 Posterior Pelvic Tilt   PTRx Ther Proc 2 - Details Symptom relief   PTRx Ther Proc 3 Supine Abdominal Exercise #3 (Marching)   PTRx Ther Proc 3 - Details Reviewed   PTRx Ther Proc 4 Bridging   PTRx Ther Proc 4 - Details Reviewed   PTRx Ther " Proc 5 Prone Press Ups   PTRx Ther Proc 5 - Details VR   PTRx Ther Proc 6 Prone On Elbows   PTRx Ther Proc 6 - Details VR   PTRx Ther Proc 7 Standing Extension at Counter Supported   PTRx Ther Proc 7 - Details VR   PTRx Ther Proc 8 Lateral band walk   PTRx Ther Proc 8 - Details 2x20 ft each direction GTB around ankles   PTRx Ther Proc 9 Monster walk   PTRx Ther Proc 9 - Details x20 ft GTB around ankles FW/BW   PTRx Ther Proc 10 Pallof Press   PTRx Ther Proc 10 - Details 2x15 each side weight 2 in hoist machine - reports good core activation   Skilled Intervention modified/progressed/regressed exercise based on patient response   Patient Response/Progress Tolerated well   Therapeutic Activity   Therapeutic Activities: dynamic activities to improve functional performance minutes (77997) 10   Ther Act 1 Stair navigation   Ther Act 1 - Details Assessed pt stair navigation as pt has c/o pain with stair ascension. Stair navigation overall good, however PT did observe some dynamic knee valgus on each side. Demonstrated and cued pt to keep patella in line with toes when ascending. Pt trialed reps of this technique with reports of reduced pain.   Skilled Intervention education and demonstration of improved technique with functional activities   Patient Response/Progress tolerated well , responded well to cues   Ther Act 2 Lifting assessment   Ther Act 2 - Details Assessed lifting form as pt had c/o pain with lifting at work/home. Observed good form with squat rather than stoop, however PT did observe pt had weight far out in front of her when squatting to lift. cued pt to stand closer to weighted box when lifting, which reduced pt's pain with lifting weighted box   Education   Learner/Method Patient;No Barriers to Learning   Plan   Home program PTRx (link provided)   Plan for next session review HEP, progress as able, DC from pT   Total Session Time   Timed Code Treatment Minutes 35   Total Treatment Time (sum of timed and  untimed services) 35         PLAN  Continue therapy per current plan of care. Anticipate one more visit to assess response to review of functional activity modifications and strengthening.     Beginning/End Dates of Progress Note Reporting Period:  12/07/24 to 03/11/2024    Referring Provider:  Tristen Mason

## 2024-06-14 ENCOUNTER — PATIENT OUTREACH (OUTPATIENT)
Dept: CARE COORDINATION | Facility: CLINIC | Age: 44
End: 2024-06-14
Payer: COMMERCIAL

## 2024-07-12 ENCOUNTER — PATIENT OUTREACH (OUTPATIENT)
Dept: CARE COORDINATION | Facility: CLINIC | Age: 44
End: 2024-07-12
Payer: COMMERCIAL

## 2024-07-16 ENCOUNTER — ANCILLARY PROCEDURE (OUTPATIENT)
Dept: MAMMOGRAPHY | Facility: CLINIC | Age: 44
End: 2024-07-16
Attending: OBSTETRICS & GYNECOLOGY
Payer: COMMERCIAL

## 2024-07-16 DIAGNOSIS — Z12.31 VISIT FOR SCREENING MAMMOGRAM: ICD-10-CM

## 2024-07-16 PROCEDURE — 77063 BREAST TOMOSYNTHESIS BI: CPT | Mod: TC | Performed by: RADIOLOGY

## 2024-07-16 PROCEDURE — 77067 SCR MAMMO BI INCL CAD: CPT | Mod: TC | Performed by: RADIOLOGY

## 2025-01-02 ENCOUNTER — VIRTUAL VISIT (OUTPATIENT)
Dept: FAMILY MEDICINE | Facility: CLINIC | Age: 45
End: 2025-01-02
Payer: COMMERCIAL

## 2025-01-02 DIAGNOSIS — H81.11 BENIGN PAROXYSMAL POSITIONAL VERTIGO OF RIGHT EAR: Primary | ICD-10-CM

## 2025-01-02 RX ORDER — MECLIZINE HYDROCHLORIDE 25 MG/1
25 TABLET ORAL 3 TIMES DAILY PRN
Qty: 9 TABLET | Refills: 0 | Status: SHIPPED | OUTPATIENT
Start: 2025-01-02

## 2025-01-02 NOTE — PROGRESS NOTES
Brynn is a 44 year old who is being evaluated via a billable video visit.          Assessment & Plan     Benign paroxysmal positional vertigo of right ear  Cranial nerve exam that I am able to do via video is normal  No facial droop.   Worse when rolling to the right.   Encouraged epley meneuver to the right  Will give meclizine  Gave strict ER signs.    If in 4 days still happening, needs in person reevaluation and possible vestibular therapy.    - meclizine (ANTIVERT) 25 MG tablet; Take 1 tablet (25 mg) by mouth 3 times daily as needed for dizziness.      Subjective   Brynn is a 44 year old, presenting for the following health issues:  Dizziness    HPI     Spinning  Worse when she is turning to the right   When she rolls over it happens   When she gets up to go to the bathroom it happens   Nausea   Almost vomited this morning.            Objective           Vitals:  No vitals were obtained today due to virtual visit.    Physical Exam   GENERAL: alert and no distress  EYES: Eyes grossly normal to inspection.  No discharge or erythema, or obvious scleral/conjunctival abnormalities.  RESP: No audible wheeze, cough, or visible cyanosis.    SKIN: Visible skin clear. No significant rash, abnormal pigmentation or lesions.  NEURO: Cranial nerves grossly intact.  Mentation and speech appropriate for age. No facial weakness   PSYCH: Appropriate affect, tone, and pace of words       Video-Visit Details    Type of service:  Video Visit   Originating Location (pt. Location): Home    Distant Location (provider location):  On-site  Platform used for Video Visit: Suhas  Signed Electronically by: Ankur Feliciano DO

## 2025-01-03 ENCOUNTER — NURSE TRIAGE (OUTPATIENT)
Dept: FAMILY MEDICINE | Facility: CLINIC | Age: 45
End: 2025-01-03

## 2025-01-03 NOTE — TELEPHONE ENCOUNTER
"Patient is calling after had a virtual visit yesterday and took two doses of meclizine already with no relieve of dizziness. Severe vertigo.  Weak upon standing, can't walk without assistance.   is assisting patient.      Did not go to work yesterday and today     Patient is in bed.    Writer advised patient to be evaluated at the hospital.  Someone else to drive.  Patient agreed with a plan.    Reason for Disposition   SEVERE dizziness (vertigo) (e.g., unable to walk without assistance)    Additional Information   Negative: [1] Weakness (i.e., paralysis, loss of muscle strength) of the face, arm or leg on one side of the body AND [2] sudden onset AND [3] present now   Negative: [1] Numbness (i.e., loss of sensation) of the face, arm or leg on one side of the body AND [2] sudden onset AND [3] present now   Negative: [1] Loss of speech or garbled speech AND [2] sudden onset AND [3] present now   Negative: Difficult to awaken or acting confused (e.g., disoriented, slurred speech)   Negative: Sounds like a life-threatening emergency to the triager   Negative: Followed a head injury   Negative: Followed an ear injury   Negative: Localized weakness or numbness is main symptom   Negative: Dizziness relates to riding in a car, going to an amusement park, etc.   Negative: [1] Dizziness is main symptom AND [2] NO spinning sensation (i.e., vertigo)    Answer Assessment - Initial Assessment Questions  1. DESCRIPTION: \"Describe your dizziness.\"      Roof and wall is moving, vertigo, can't walk alone without assistance  2. VERTIGO: \"Do you feel like either you or the room is spinning or tilting?\"       Yes, room is spinning  3. LIGHTHEADED: \"Do you feel lightheaded?\" (e.g., somewhat faint, woozy, weak upon standing)      Weak upon standing  4. SEVERITY: \"How bad is it?\"  \"Can you walk?\"    - MILD: Feels slightly dizzy and unsteady, but is walking normally.    - MODERATE: Feels unsteady when walking, but not falling; " "interferes with normal activities (e.g., school, work).    - SEVERE: Unable to walk without falling, or requires assistance to walk without falling.      severe  5. ONSET:  \"When did the dizziness begin?\"      Yesterday morning, nausea started with vertigo, dry hives, no nausea today  6. AGGRAVATING FACTORS: \"Does anything make it worse?\" (e.g., standing, change in head position)      Sleeping helps.  Change of position makes it worse  7. CAUSE: \"What do you think is causing the dizziness?\"      No idea, may be dehydration  8. RECURRENT SYMPTOM: \"Have you had dizziness before?\" If Yes, ask: \"When was the last time?\" \"What happened that time?\"      No    9. OTHER SYMPTOMS: \"Do you have any other symptoms?\" (e.g., headache, weakness, numbness, vomiting, earache)      No    10. PREGNANCY: \"Is there any chance you are pregnant?\" \"When was your last menstrual period?\"        NA    Protocols used: Dizziness - Vertigo-RITA-  Nilsa Alcala RN, BSN  [facility]    "

## 2025-02-06 ENCOUNTER — TELEPHONE (OUTPATIENT)
Dept: FAMILY MEDICINE | Facility: CLINIC | Age: 45
End: 2025-02-06

## 2025-02-06 ENCOUNTER — OFFICE VISIT (OUTPATIENT)
Dept: FAMILY MEDICINE | Facility: CLINIC | Age: 45
End: 2025-02-06
Payer: COMMERCIAL

## 2025-02-06 VITALS
HEIGHT: 63 IN | WEIGHT: 162.2 LBS | BODY MASS INDEX: 28.74 KG/M2 | TEMPERATURE: 97.2 F | HEART RATE: 78 BPM | DIASTOLIC BLOOD PRESSURE: 62 MMHG | OXYGEN SATURATION: 100 % | SYSTOLIC BLOOD PRESSURE: 108 MMHG | RESPIRATION RATE: 18 BRPM

## 2025-02-06 DIAGNOSIS — Z13.220 SCREENING, LIPID: ICD-10-CM

## 2025-02-06 DIAGNOSIS — K64.4 EXTERNAL HEMORRHOIDS: ICD-10-CM

## 2025-02-06 DIAGNOSIS — N89.8 VAGINAL DISCHARGE: Primary | ICD-10-CM

## 2025-02-06 DIAGNOSIS — T83.32XD INTRAUTERINE CONTRACEPTIVE DEVICE THREADS LOST, SUBSEQUENT ENCOUNTER: ICD-10-CM

## 2025-02-06 DIAGNOSIS — Z97.5 IUD CONTRACEPTION: ICD-10-CM

## 2025-02-06 DIAGNOSIS — D72.819 LEUKOPENIA, UNSPECIFIED TYPE: ICD-10-CM

## 2025-02-06 DIAGNOSIS — Z13.1 SCREENING FOR DIABETES MELLITUS: ICD-10-CM

## 2025-02-06 LAB
CLUE CELLS: ABNORMAL
TRICHOMONAS, WET PREP: ABNORMAL
WBC'S/HIGH POWER FIELD, WET PREP: ABNORMAL
YEAST, WET PREP: ABNORMAL

## 2025-02-06 RX ORDER — HYDROCORTISONE ACETATE 25 MG/1
25 SUPPOSITORY RECTAL 2 TIMES DAILY
Qty: 28 SUPPOSITORY | Refills: 1 | Status: SHIPPED | OUTPATIENT
Start: 2025-02-06

## 2025-02-06 ASSESSMENT — PAIN SCALES - GENERAL: PAINLEVEL_OUTOF10: NO PAIN (0)

## 2025-02-06 NOTE — TELEPHONE ENCOUNTER
Attempted to call patient at home/mobile number, no answer, left message on voicemail; patient was instructed to return call to [facility] at [phone].    Phone encounter created for follow up.    Dorcas WONG RN  [facility] Triage

## 2025-02-06 NOTE — RESULT ENCOUNTER NOTE
PLEASE CALL PATIENT:  Dear Brynn,      It was a pleasure to see you at your recent office visit.  Your test results are listed below.  Wet prep was negative for yeast of bv, no evidence of vaginal infection. Please f/u with obgyn as discussed.         If you have any questions or concerns, please call the clinic at [phone].    Sincerely,  Michelle Mead PA-C

## 2025-02-06 NOTE — TELEPHONE ENCOUNTER
----- Message from Michelle Mead sent at 2/6/2025  3:48 PM CST -----  PLEASE CALL PATIENT:  Dear Brynn,      It was a pleasure to see you at your recent office visit.  Your test results are listed below.  Wet prep was negative for yeast of bv, no evidence of vaginal infection. Please f/u with obgyn as discussed.         If you have any questions or concerns, please call the clinic at [phone].    Sincerely,  Michelle Mead PA-C

## 2025-02-06 NOTE — PROGRESS NOTES
Assessment & Plan     Vaginal discharge  Negative wet prep  F/u obgyn if symptoms worsen or change f/u sooner  - Wet prep - lab collect; Future  - Wet prep - lab collect    External hemorrhoids  stable  - hydrocortisone (ANUSOL-HC) 25 MG suppository; Place 1 suppository (25 mg) rectally 2 times daily.    Leukopenia, unspecified type  Will recheck  I will follow up with labs     - CBC with platelets and differential; Future  - Vitamin D Deficiency; Future    IUD contraception  Referred due to difficulty with last attempt see hpi  - Ob/Gyn  Referral; Future    Screening for diabetes mellitus    - Hemoglobin A1c; Future  - Basic metabolic panel  (Ca, Cl, CO2, Creat, Gluc, K, Na, BUN); Future    Screening, lipid    - Lipid panel reflex to direct LDL Fasting; Future    Intrauterine contraceptive device threads lost, subsequent encounter          The longitudinal plan of care for the diagnosis(es)/condition(s) as documented were addressed during this visit. Due to the added complexity in care, I will continue to support Brynn in the subsequent management and with ongoing continuity of care.      Humberto Swain is a 44 year old, presenting for the following health issues:  IUD (Would like to get referred for removal )        2/6/2025     2:48 PM   Additional Questions   Roomed by Jayne JADE MA   Accompanied by n/a         2/6/2025     2:48 PM   Patient Reported Additional Medications   Patient reports taking the following new medications No Medications Missing     History of Present Illness       Reason for visit:  Chikup    She eats 0-1 servings of fruits and vegetables daily.She consumes 0 sweetened beverage(s) daily.She exercises with enough effort to increase her heart rate 30 to 60 minutes per day.  She exercises with enough effort to increase her heart rate 3 or less days per week.   She is taking medications regularly.     Has been having irregular periods with IUD. IUD is 2 years overdue for  "being removed.  She states previous OBGYN tried but it was too painful and patient did not want surgical removal done. She Would like to discuss being referred so this can happen but see a female obgyn. Is having a physical with Dallas Parra on 2/10/24. She is requesting labs be ordered including a1c and vitamin d. She has h/o significant neutropenia but differential is normal.     She was not able to find strings, had us that showed normal iud position in 2022.     Some itching and discharge vaginally off and on. She is wondering if she has a vaginal infection possibly, has been off and on for a long time.     Due for pap 2027.     No fevers or abdominal pain.       Objective    /62   Pulse 78   Temp 97.2  F (36.2  C) (Temporal)   Resp 18   Ht 1.595 m (5' 2.8\")   Wt 73.6 kg (162 lb 3.2 oz)   LMP 01/16/2025 (Within Weeks)   SpO2 100%   BMI 28.92 kg/m    Body mass index is 28.92 kg/m .  Physical Exam   GENERAL: alert and no distress  RESP: lungs clear to auscultation - no rales, rhonchi or wheezes  CV: regular rate and rhythm, normal S1 S2, no S3 or S4, no murmur, click or rub, no peripheral edema  MS: no gross musculoskeletal defects noted, no edema  PSYCH: mentation appears normal, affect normal/bright    Results for orders placed or performed in visit on 02/06/25   Wet prep - lab collect     Status: Abnormal    Specimen: Vagina; Swab   Result Value Ref Range    Trichomonas Absent Absent    Yeast Absent Absent    Clue Cells Absent Absent    WBCs/high power field 1+ (A) None             Signed Electronically by: Michelle Mead PA-C    "

## 2025-02-06 NOTE — TELEPHONE ENCOUNTER
RN received call from patient.    RN reviewed providers message.    Patient verbalized understanding.    Miguel Javed RN, BSN, PHN  [facility]

## 2025-02-10 ENCOUNTER — OFFICE VISIT (OUTPATIENT)
Dept: FAMILY MEDICINE | Facility: CLINIC | Age: 45
End: 2025-02-10
Payer: COMMERCIAL

## 2025-02-10 VITALS
WEIGHT: 160.8 LBS | HEART RATE: 71 BPM | BODY MASS INDEX: 28.49 KG/M2 | SYSTOLIC BLOOD PRESSURE: 96 MMHG | TEMPERATURE: 98 F | HEIGHT: 63 IN | RESPIRATION RATE: 20 BRPM | OXYGEN SATURATION: 100 % | DIASTOLIC BLOOD PRESSURE: 64 MMHG

## 2025-02-10 DIAGNOSIS — K59.09 OTHER CONSTIPATION: ICD-10-CM

## 2025-02-10 DIAGNOSIS — Z13.220 LIPID SCREENING: ICD-10-CM

## 2025-02-10 DIAGNOSIS — C64.2 MALIGNANT NEOPLASM OF KIDNEY EXCLUDING RENAL PELVIS, LEFT (H): ICD-10-CM

## 2025-02-10 DIAGNOSIS — E55.9 VITAMIN D DEFICIENCY: ICD-10-CM

## 2025-02-10 DIAGNOSIS — Z00.00 ROUTINE GENERAL MEDICAL EXAMINATION AT A HEALTH CARE FACILITY: Primary | ICD-10-CM

## 2025-02-10 DIAGNOSIS — Z13.220 SCREENING, LIPID: ICD-10-CM

## 2025-02-10 DIAGNOSIS — Z13.1 SCREENING FOR DIABETES MELLITUS: ICD-10-CM

## 2025-02-10 DIAGNOSIS — D72.819 LEUKOPENIA, UNSPECIFIED TYPE: ICD-10-CM

## 2025-02-10 LAB
ALBUMIN SERPL BCG-MCNC: 4 G/DL (ref 3.5–5.2)
ALP SERPL-CCNC: 49 U/L (ref 40–150)
ALT SERPL W P-5'-P-CCNC: 17 U/L (ref 0–50)
ANION GAP SERPL CALCULATED.3IONS-SCNC: 11 MMOL/L (ref 7–15)
AST SERPL W P-5'-P-CCNC: 21 U/L (ref 0–45)
BASOPHILS # BLD AUTO: 0 10E3/UL (ref 0–0.2)
BASOPHILS NFR BLD AUTO: 0 %
BILIRUB SERPL-MCNC: 0.9 MG/DL
BUN SERPL-MCNC: 9.1 MG/DL (ref 6–20)
CALCIUM SERPL-MCNC: 9.2 MG/DL (ref 8.8–10.4)
CHLORIDE SERPL-SCNC: 105 MMOL/L (ref 98–107)
CHOLEST SERPL-MCNC: 145 MG/DL
CREAT SERPL-MCNC: 0.64 MG/DL (ref 0.51–0.95)
EGFRCR SERPLBLD CKD-EPI 2021: >90 ML/MIN/1.73M2
EOSINOPHIL # BLD AUTO: 0.2 10E3/UL (ref 0–0.7)
EOSINOPHIL NFR BLD AUTO: 10 %
ERYTHROCYTE [DISTWIDTH] IN BLOOD BY AUTOMATED COUNT: 12.4 % (ref 10–15)
EST. AVERAGE GLUCOSE BLD GHB EST-MCNC: 117 MG/DL
FASTING STATUS PATIENT QL REPORTED: ABNORMAL
FASTING STATUS PATIENT QL REPORTED: NORMAL
GLUCOSE SERPL-MCNC: 89 MG/DL (ref 70–99)
HBA1C MFR BLD: 5.7 % (ref 0–5.6)
HCO3 SERPL-SCNC: 26 MMOL/L (ref 22–29)
HCT VFR BLD AUTO: 41 % (ref 35–47)
HDLC SERPL-MCNC: 45 MG/DL
HGB BLD-MCNC: 13.8 G/DL (ref 11.7–15.7)
IMM GRANULOCYTES # BLD: 0 10E3/UL
IMM GRANULOCYTES NFR BLD: 0 %
LDLC SERPL CALC-MCNC: 88 MG/DL
LYMPHOCYTES # BLD AUTO: 1.2 10E3/UL (ref 0.8–5.3)
LYMPHOCYTES NFR BLD AUTO: 51 %
MCH RBC QN AUTO: 31.1 PG (ref 26.5–33)
MCHC RBC AUTO-ENTMCNC: 33.7 G/DL (ref 31.5–36.5)
MCV RBC AUTO: 92 FL (ref 78–100)
MONOCYTES # BLD AUTO: 0.2 10E3/UL (ref 0–1.3)
MONOCYTES NFR BLD AUTO: 9 %
NEUTROPHILS # BLD AUTO: 0.7 10E3/UL (ref 1.6–8.3)
NEUTROPHILS NFR BLD AUTO: 30 %
NONHDLC SERPL-MCNC: 100 MG/DL
NRBC # BLD AUTO: 0 10E3/UL
NRBC BLD AUTO-RTO: 0 /100
PLATELET # BLD AUTO: 184 10E3/UL (ref 150–450)
POTASSIUM SERPL-SCNC: 3.9 MMOL/L (ref 3.4–5.3)
PROT SERPL-MCNC: 6.6 G/DL (ref 6.4–8.3)
RBC # BLD AUTO: 4.44 10E6/UL (ref 3.8–5.2)
SODIUM SERPL-SCNC: 142 MMOL/L (ref 135–145)
TRIGL SERPL-MCNC: 59 MG/DL
TSH SERPL DL<=0.005 MIU/L-ACNC: 2.37 UIU/ML (ref 0.3–4.2)
VIT D+METAB SERPL-MCNC: 13 NG/ML (ref 20–50)
WBC # BLD AUTO: 2.4 10E3/UL (ref 4–11)

## 2025-02-10 PROCEDURE — 83036 HEMOGLOBIN GLYCOSYLATED A1C: CPT | Performed by: PHYSICIAN ASSISTANT

## 2025-02-10 PROCEDURE — 99396 PREV VISIT EST AGE 40-64: CPT | Performed by: PHYSICIAN ASSISTANT

## 2025-02-10 PROCEDURE — 80053 COMPREHEN METABOLIC PANEL: CPT | Performed by: PHYSICIAN ASSISTANT

## 2025-02-10 PROCEDURE — 84443 ASSAY THYROID STIM HORMONE: CPT | Performed by: PHYSICIAN ASSISTANT

## 2025-02-10 PROCEDURE — 85025 COMPLETE CBC W/AUTO DIFF WBC: CPT | Performed by: PHYSICIAN ASSISTANT

## 2025-02-10 PROCEDURE — 80061 LIPID PANEL: CPT | Performed by: PHYSICIAN ASSISTANT

## 2025-02-10 PROCEDURE — 82306 VITAMIN D 25 HYDROXY: CPT | Performed by: PHYSICIAN ASSISTANT

## 2025-02-10 PROCEDURE — 36415 COLL VENOUS BLD VENIPUNCTURE: CPT | Performed by: PHYSICIAN ASSISTANT

## 2025-02-10 SDOH — HEALTH STABILITY: PHYSICAL HEALTH: ON AVERAGE, HOW MANY DAYS PER WEEK DO YOU ENGAGE IN MODERATE TO STRENUOUS EXERCISE (LIKE A BRISK WALK)?: 3 DAYS

## 2025-02-10 ASSESSMENT — SOCIAL DETERMINANTS OF HEALTH (SDOH): HOW OFTEN DO YOU GET TOGETHER WITH FRIENDS OR RELATIVES?: ONCE A WEEK

## 2025-02-10 NOTE — PATIENT INSTRUCTIONS
Patient Education   Preventive Care Advice   This is general advice given by our system to help you stay healthy. However, your care team may have specific advice just for you. Please talk to your care team about your preventive care needs.  Nutrition  Eat 5 or more servings of fruits and vegetables each day.  Try wheat bread, brown rice and whole grain pasta (instead of white bread, rice, and pasta).  Get enough calcium and vitamin D. Check the label on foods and aim for 100% of the RDA (recommended daily allowance).  Lifestyle  Exercise at least 150 minutes each week  (30 minutes a day, 5 days a week).  Do muscle strengthening activities 2 days a week. These help control your weight and prevent disease.  No smoking.  Wear sunscreen to prevent skin cancer.  Have a dental exam and cleaning every 6 months.  Yearly exams  See your health care team every year to talk about:  Any changes in your health.  Any medicines your care team has prescribed.  Preventive care, family planning, and ways to prevent chronic diseases.  Shots (vaccines)   HPV shots (up to age 26), if you've never had them before.  Hepatitis B shots (up to age 59), if you've never had them before.  COVID-19 shot: Get this shot when it's due.  Flu shot: Get a flu shot every year.  Tetanus shot: Get a tetanus shot every 10 years.  Pneumococcal, hepatitis A, and RSV shots: Ask your care team if you need these based on your risk.  Shingles shot (for age 50 and up)  General health tests  Diabetes screening:  Starting at age 35, Get screened for diabetes at least every 3 years.  If you are younger than age 35, ask your care team if you should be screened for diabetes.  Cholesterol test: At age 39, start having a cholesterol test every 5 years, or more often if advised.  Bone density scan (DEXA): At age 50, ask your care team if you should have this scan for osteoporosis (brittle bones).  Hepatitis C: Get tested at least once in your life.  STIs (sexually  transmitted infections)  Before age 24: Ask your care team if you should be screened for STIs.  After age 24: Get screened for STIs if you're at risk. You are at risk for STIs (including HIV) if:  You are sexually active with more than one person.  You don't use condoms every time.  You or a partner was diagnosed with a sexually transmitted infection.  If you are at risk for HIV, ask about PrEP medicine to prevent HIV.  Get tested for HIV at least once in your life, whether you are at risk for HIV or not.  Cancer screening tests  Cervical cancer screening: If you have a cervix, begin getting regular cervical cancer screening tests starting at age 21.  Breast cancer scan (mammogram): If you've ever had breasts, begin having regular mammograms starting at age 40. This is a scan to check for breast cancer.  Colon cancer screening: It is important to start screening for colon cancer at age 45.  Have a colonoscopy test every 10 years (or more often if you're at risk) Or, ask your provider about stool tests like a FIT test every year or Cologuard test every 3 years.  To learn more about your testing options, visit:   .  For help making a decision, visit:   https://bit.ly/vo28777.  Prostate cancer screening test: If you have a prostate, ask your care team if a prostate cancer screening test (PSA) at age 55 is right for you.  Lung cancer screening: If you are a current or former smoker ages 50 to 80, ask your care team if ongoing lung cancer screenings are right for you.  For informational purposes only. Not to replace the advice of your health care provider. Copyright   2023 [employer]. All rights reserved. Clinically reviewed by the [facility] Transitions Program. [employer] [id number] - REV 01/24.

## 2025-02-10 NOTE — PROGRESS NOTES
Preventive Care Visit  [facility] SUSI Parra PA-C, Family Medicine  Feb 10, 2025        Humberto Swain is a 44 year old, presenting for the following:  Physical and Health Maintenance (Patient is fasting/)        2/10/2025     2:40 PM   Additional Questions   Roomed by Angelic Zepeda CMA   Accompanied by none         2/10/2025     2:40 PM   Patient Reported Additional Medications   Patient reports taking the following new medications none          HPI    Health Care Directive  Patient does not have a Health Care Directive: Discussed advance care planning with patient; however, patient declined at this time.      2/10/2025   General Health   How would you rate your overall physical health? Good   Feel stress (tense, anxious, or unable to sleep) Not at all         2/10/2025   Nutrition   Three or more servings of calcium each day? Yes   Diet: I don't know   How many servings of fruit and vegetables per day? (!) 0-1   How many sweetened beverages each day? 0-1         2/10/2025   Exercise   Days per week of moderate/strenous exercise 3 days         2/10/2025   Social Factors   Frequency of gathering with friends or relatives Once a week   Worry food won't last until get money to buy more No   Food not last or not have enough money for food? No   Do you have housing? (Housing is defined as stable permanent housing and does not include staying ouside in a car, in a tent, in an abandoned building, in an overnight shelter, or couch-surfing.) No   Are you worried about losing your housing? No   Lack of transportation? No   Unable to get utilities (heat,electricity)? Patient declined   Want help with housing or utility concern? No   (!) HOUSING CONCERN PRESENT      2/10/2025   Dental   Dentist two times every year? Yes            Today's PHQ-2 Score:       2/10/2025     2:26 PM   PHQ-2 ( 1999 [employer])   Q1: Little interest or pleasure in doing things 0   Q2: Feeling down, depressed or  hopeless 0   PHQ-2 Score 0    Q1: Little interest or pleasure in doing things Not at all   Q2: Feeling down, depressed or hopeless Not at all   PHQ-2 Score 0       Patient-reported           2/10/2025   Substance Use   Alcohol more than 3/day or more than 7/wk No   Do you use any other substances recreationally? No     Social History     Tobacco Use    Smoking status: Never     Passive exposure: Never    Smokeless tobacco: Never   Vaping Use    Vaping status: Never Used   Substance Use Topics    Alcohol use: No    Drug use: No           7/16/2024   LAST FHS-7 RESULTS   1st degree relative breast or ovarian cancer No   Any relative bilateral breast cancer No   Any male have breast cancer No   Any ONE woman have BOTH breast AND ovarian cancer No   Any woman with breast cancer before 50yrs No   2 or more relatives with breast AND/OR ovarian cancer No   2 or more relatives with breast AND/OR bowel cancer No        Mammogram Screening - Annual screen due to greater than 20% lifetime risk as estimated by Breast Cancer Risk Calculator        2/10/2025   STI Screening   New sexual partner(s) since last STI/HIV test? No     History of abnormal Pap smear: No - age 30-64 HPV with reflex Pap every 5 years recommended        Latest Ref Rng & Units 6/6/2022     2:52 PM 9/21/2017     2:56 PM 9/21/2017     2:45 PM   PAP / HPV   PAP  Negative for Intraepithelial Lesion or Malignancy (NILM)      PAP (Historical)   NIL     HPV 16 DNA Negative Negative   Negative    HPV 18 DNA Negative Negative   Negative    Other HR HPV Negative Negative   Negative      ASCVD Risk   The 10-year ASCVD risk score (Kizzy HALEY, et al., 2019) is: 0.5%    Values used to calculate the score:      Age: 44 years      Sex: Female      Is Non- : Yes      Diabetic: No      Tobacco smoker: No      Systolic Blood Pressure: 108 mmHg      Is BP treated: No      HDL Cholesterol: 46 mg/dL      Total Cholesterol: 157 mg/dL         2/10/2025   Contraception/Family Planning   Questions about contraception or family planning No        Reviewed and updated as needed this visit by Provider                    Past Medical History:   Diagnosis Date    Chronic neutropenia     possibly benign essential neutropenia    GERD (gastroesophageal reflux disease)     IUD (intrauterine device) in place     placed in 10/16/2015    Left renal mass     Normal pregnancy 2006,08,09,12    Pulmonary nodules     Renal cell carcinoma (H)      Past Surgical History:   Procedure Laterality Date    DAVINCI NEPHRECTOMY PARTIAL Left 1/16/2017    Procedure: DAVINCI NEPHRECTOMY PARTIAL;  Surgeon: Jairo Cross MD;  Location: [facility] OR    INSERT INTRAUTERINE DEVICE  10/16/15     BP Readings from Last 3 Encounters:   02/10/25 96/64   02/06/25 108/62   12/06/23 100/82    Wt Readings from Last 3 Encounters:   02/10/25 72.9 kg (160 lb 12.8 oz)   02/06/25 73.6 kg (162 lb 3.2 oz)   12/06/23 67.1 kg (148 lb)                  Patient Active Problem List   Diagnosis    CARDIOVASCULAR SCREENING; LDL GOAL LESS THAN 160    Cervical polyp    Impingement syndrome of right shoulder    Anal fissure    IUD contraception    Acute bilateral low back pain without sciatica    Acute right-sided low back pain without sciatica    Leukopenia, unspecified type    Intrauterine contraceptive device threads lost, subsequent encounter     Past Surgical History:   Procedure Laterality Date    DAVINCI NEPHRECTOMY PARTIAL Left 1/16/2017    Procedure: DAVINCI NEPHRECTOMY PARTIAL;  Surgeon: Jairo Cross MD;  Location: [facility] OR    INSERT INTRAUTERINE DEVICE  10/16/15       Social History     Tobacco Use    Smoking status: Never     Passive exposure: Never    Smokeless tobacco: Never   Substance Use Topics    Alcohol use: No     Family History   Problem Relation Age of Onset    Family History Negative Mother     Family History Negative Father     Cancer No family hx of          Current  Outpatient Medications   Medication Sig Dispense Refill    acetaminophen (TYLENOL) 325 MG tablet Take 325-650 mg by mouth every 6 hours as needed for mild pain (Patient not taking: Reported on 2/10/2025)      cyclobenzaprine (FLEXERIL) 10 MG tablet Take 0.5-1 tablets (5-10 mg) by mouth 3 times daily as needed for muscle spasms (Patient not taking: Reported on 2/10/2025) 25 tablet 0    hydrocortisone (ANUSOL-HC) 25 MG suppository Place 1 suppository (25 mg) rectally 2 times daily. (Patient not taking: Reported on 2/10/2025) 28 suppository 1    ibuprofen (ADVIL/MOTRIN) 200 MG capsule Take 200 mg by mouth every 4 hours as needed for fever (Patient not taking: Reported on 2/10/2025)      levonorgestrel (MIRENA) 20 MCG/24HR IUD 1 each by Intrauterine route once (Patient not taking: Reported on 2/10/2025)      meclizine (ANTIVERT) 25 MG tablet Take 1 tablet (25 mg) by mouth 3 times daily as needed for dizziness. (Patient not taking: Reported on 2/10/2025) 9 tablet 0     No Known Allergies  Recent Labs   Lab Test 01/24/23  1120 05/04/22  0809 12/29/21  1308 12/11/20  1327 12/11/20  1253 09/24/20  1458 10/13/17  0944 10/13/17  0942 01/16/17  1745 12/16/16  1143   A1C  --   --   --   --   --  4.9  --   --   --   --    LDL  --  99  --   --   --  126*  --  90  --   --    HDL  --  46*  --   --   --  42*  --  43*  --   --    TRIG  --  58  --   --   --  78  --  60  --   --    ALT 11  --   --   --   --   --   --   --   --  20   CR 0.59 0.56   < >  --  0.63  --    < >  --    < > 0.58   GFRESTIMATED >90 >90   < > >90 >90  --    < >  --    < > >90  Non  GFR Calc     GFRESTBLACK  --   --   --  >90 >90  --    < >  --    < > >90   GFR Calc     POTASSIUM 4.4 4.6   < >  --  3.8  --    < >  --    < > 3.7   TSH  --   --   --   --   --  2.34  --  1.98  --   --     < > = values in this interval not displayed.          Review of Systems  Constitutional, HEENT, cardiovascular, pulmonary, GI, , musculoskeletal,  "neuro, skin, endocrine and psych systems are negative, except as otherwise noted.     Objective    Exam  LMP 01/16/2025 (Within Weeks)    Estimated body mass index is 28.92 kg/m  as calculated from the following:    Height as of 2/6/25: 1.595 m (5' 2.8\").    Weight as of 2/6/25: 73.6 kg (162 lb 3.2 oz).    Physical Exam  GENERAL: alert and no distress  EYES: Eyes grossly normal to inspection, PERRL and conjunctivae and sclerae normal  HENT: ear canals and TM's normal, nose and mouth without ulcers or lesions  NECK: no adenopathy, no asymmetry, masses, or scars  RESP: lungs clear to auscultation - no rales, rhonchi or wheezes  CV: regular rate and rhythm, normal S1 S2, no S3 or S4, no murmur, click or rub, no peripheral edema  ABDOMEN: soft, nontender, no hepatosplenomegaly, no masses and bowel sounds normal  MS: no gross musculoskeletal defects noted, no edema  SKIN: no suspicious lesions or rashes  NEURO: Normal strength and tone, mentation intact and speech normal  PSYCH: mentation appears normal, affect normal/bright    Brynn was seen today for physical and health maintenance.    Diagnoses and all orders for this visit:    Routine general medical examination at a health care facility    Malignant neoplasm of kidney excluding renal pelvis, left (H)  -     Comprehensive metabolic panel (BMP + Alb, Alk Phos, ALT, AST, Total. Bili, TP); Future  Cancer free, followed by urology   Leukopenia, unspecified type  Long history of this. Not on any immunosuppressants.   Will consult with hematology to be safe  Screening for diabetes mellitus  -     Hemoglobin A1c; Future    Lipid screening  -     Lipid panel reflex to direct LDL Fasting; Future    Vitamin D deficiency  -     Vitamin D Deficiency; Future    Other orders  -     REVIEW OF HEALTH MAINTENANCE PROTOCOL ORDERS  -     PRIMARY CARE FOLLOW-UP SCHEDULING; Future    Lower fat, higher fiber diet and consistent exercise.  Continue to work on a lower sugar/starch diet " and more exercise.    Some occasional constipation. Will check a tsh/free t4.    Signed Electronically by: Dallas Parra PA-C

## 2025-02-11 ENCOUNTER — PATIENT OUTREACH (OUTPATIENT)
Dept: ONCOLOGY | Facility: CLINIC | Age: 45
End: 2025-02-11
Payer: COMMERCIAL

## 2025-02-11 DIAGNOSIS — D70.9 NEUTROPENIA, UNSPECIFIED TYPE: Primary | ICD-10-CM

## 2025-02-11 NOTE — PROGRESS NOTES
New Patient Oncology Nurse Navigator Note     Referring provider: Michelle Mead PA-C      Referring Clinic/Organization: [facility]      Referred to (specialty:) Hematology      Requested provider (if applicable): NA     Date Referral Received: February 11, 2025     Evaluation for:  D70.9 (ICD-10-CM) - Neutropenia, unspecified type      Payor: MEDICA / Plan: MEDICA ESSENTIAL / Product Type: Indemnity /     February 11, 2025    Referral received and reviewed. Patient seen by Michelle Mead PA-C on 2/6/25. Patient was noted to have leukopenia.     1/4/2025  Component  Ref Range & Units 1 mo ago   WBC  4.3 - 10.8 K/uL 2.4 Low    RBC  4.20 - 5.40 M/uL 4.64   Hemoglobin  12.0 - 16.0 gm/dL 14.6   Hematocrit  36.0 - 48.0 % 41   MCV  80 - 100 fL 88   MCH  27 - 33 pg 32   MCHC  33 - 36 gm/dL 36   RDW  11.5 - 14.5 % 12.7   Platelet Count  150 - 400 K/   MPV  6.5 - 12 fL 10.7         Component  Ref Range & Units 1 d ago 2 yr ago 3 yr ago 4 yr ago 5 yr ago 6 yr ago 7 yr ago     WBC Count  4.0 - 11.0 10e3/uL 2.4 Low  2.0 Low  2.3 Low  2.7 Low  R 2.6 Low  R 2.2 Low  R 3.0 Low  R    RBC Count  3.80 - 5.20 10e6/uL 4.44 4.47 4.69 4.50 R 4.65 R 4.03 R 4.66 R    Hemoglobin  11.7 - 15.7 g/dL 13.8 13.9 14.7 14.3 14.7 12.7 14.7    Hematocrit  35.0 - 47.0 % 41.0 41.1 43.1 41.4 43.1 37.5 43.2    MCV  78 - 100 fL 92 92 92 92 R 93 R 93 R 93 R    MCH  26.5 - 33.0 pg 31.1 31.1 31.3 31.8 31.6 31.5 31.5    MCHC  31.5 - 36.5 g/dL 33.7 33.8 34.1 34.5 34.1 33.9 34.0    RDW  10.0 - 15.0 % 12.4 12.9 12.2 12.2 12.1 12.2 12.5    Platelet Count  150 - 450 10e3/uL 184 197 198 195 R 181 R 167 R 171 R    % Neutrophils  % 30   47.1 36.1 46.4 54.4    % Lymphocytes  % 51   38.9 49.4 39.3 28.3    % Monocytes  % 9   10.2 10.6 10.3 11.3    % Eosinophils  % 10   3.4 2.7 2.7 5.0    % Basophils  % 0   0.4 0.8 0.9 0.7    % Immature Granulocytes  % 0          NRBCs per 100 WBC  <1 /100 0          Absolute Neutrophils  1.6 - 8.3  10e3/uL 0.7 Low           Absolute Lymphocytes  0.8 - 5.3 10e3/uL 1.2          Absolute Monocytes  0.0 - 1.3 10e3/uL 0.2          Absolute Eosinophils  0.0 - 0.7 10e3/uL 0.2          Absolute Basophils  0.0 - 0.2 10e3/uL 0.0          Absolute Immature Granulocytes  <=0.4 10e3/uL 0.0          Absolute NRBCs  10e3/uL 0.0         Resulting Agency [facility] SURGERY [facility] SURGERY [facility]             Specimen Collected: 02/10/25  3:22 PM Last Resulted: 02/10/25  6:54 PM       Sent to [facility] to schedule.     Bushra Santoro BSN, RN, OCN  Oncology Nurse Navigator   [facility]  Cancer Care Service Line   New Patient Hem/Onc Scheduling / Referrals: [phone] (fax: [phone] )

## 2025-02-11 NOTE — RESULT ENCOUNTER NOTE
PLEASE CALL PATIENT: Dear Brynn,      It was a pleasure to see you at your recent office visit.  Your test results are listed below.  Your white blood cell count remains low at 2.4.  You are also now low on neutrophils which are a type of white blood cells.  This needs to be further evaluated by hematology I have placed this referral.  Please schedule this.        If you have any questions or concerns, please call the clinic at [phone].    Sincerely,  Michelle Mead PA-C

## (undated) DEVICE — DRAPE IOBAN INCISE 23X17" 6650EZ

## (undated) DEVICE — ESU GROUND PAD ADULT REM W/15' CORD E7507DB

## (undated) DEVICE — SU VICRYL 2-0 CT-1 27" UND J259H

## (undated) DEVICE — PACK GOWN 3/PK DISP XL SBA32GPFCB

## (undated) DEVICE — CLIP ENDO HEMO-LOC PURPLE LG 544240

## (undated) DEVICE — SUCTION MANIFOLD DORNOCH ULTRA CART UL-CL500

## (undated) DEVICE — DRAPE SPLIT SHEET 77X108 REINFORCED 29436

## (undated) DEVICE — BLADE SWITCH SCISSORS TIP 5MM 89-5100B

## (undated) DEVICE — SU VICRYL 3-0 SH 27" J316H

## (undated) DEVICE — SU MONOCRYL 4-0 PS-2 27" UND Y426H

## (undated) DEVICE — BLADE CLIPPER SGL USE 9680

## (undated) DEVICE — SU VICRYL 0 UR-6 27" J603H

## (undated) DEVICE — PREP CHLORAPREP 26ML TINTED ORANGE  260815

## (undated) DEVICE — SU WND CLOSURE VLOC 180 ABS 3-0 6" V-20 VLOCL0604

## (undated) DEVICE — STRAP UNIVERSAL POSITIONING 2-PIECE 4X47X76" 91-287

## (undated) DEVICE — SU ETHILON 2-0 FS 18" 664H

## (undated) DEVICE — SYSTEM CLEARIFY VISUALIZATION 21-345

## (undated) DEVICE — LINEN TOWEL PACK X30 5481

## (undated) DEVICE — LINEN TOWEL PACK X6 WHITE 5487

## (undated) DEVICE — DAVINCI S NDL DRIVER LARGE 420006

## (undated) DEVICE — TUBING CONMED AIRSEAL SMOKE EVAC INSUFFLATION ASM-EVAC

## (undated) DEVICE — DAVINCI XI DRAPE ARM 470015

## (undated) DEVICE — DAVINCI XI SEAL UNIVERSAL 5-8MM 470361

## (undated) DEVICE — SU PDS II 0 TP-1 60" Z991G

## (undated) DEVICE — PACK DAVINCI UROL

## (undated) DEVICE — DAVINCI XI FCP BIPOLAR FENESTRATED 470205

## (undated) DEVICE — ENDO TROCAR CONMED AIRSEAL BLADELESS 12X120MM IAS12-120LP

## (undated) DEVICE — ADH FLOSEAL W/HUMAN THROMBIN 5ML 1501825

## (undated) DEVICE — WIPES FOLEY CARE SURESTEP PROVON DFC100

## (undated) DEVICE — TAPE DURAPORE 3" SILK 1538-3

## (undated) DEVICE — BAG SPECIMEN NYLON MEMORY WIRE 7.5"X9" SB1079

## (undated) DEVICE — DAVINCI HOT SHEARS TIP COVER  400180

## (undated) DEVICE — DRAPE IOBAN INCISE 13X13" 6640EZ

## (undated) DEVICE — SU DERMABOND ADVANCED .7ML DNX12

## (undated) DEVICE — PROTECTOR ARM ONE-STEP TRENDELENBURG 40418

## (undated) DEVICE — DAVINCI XI MONOPOLAR SCISSORS HOT SHEARS 8MM 470179

## (undated) DEVICE — DAVINCI XI DRAPE COLUMN 470341

## (undated) RX ORDER — SODIUM CHLORIDE 9 MG/ML
INJECTION, SOLUTION INTRAVENOUS
Status: DISPENSED
Start: 2017-01-16

## (undated) RX ORDER — FENTANYL CITRATE 50 UG/ML
INJECTION, SOLUTION INTRAMUSCULAR; INTRAVENOUS
Status: DISPENSED
Start: 2017-01-16

## (undated) RX ORDER — ONDANSETRON 2 MG/ML
INJECTION INTRAMUSCULAR; INTRAVENOUS
Status: DISPENSED
Start: 2017-01-16